# Patient Record
Sex: FEMALE | Race: WHITE | NOT HISPANIC OR LATINO
[De-identification: names, ages, dates, MRNs, and addresses within clinical notes are randomized per-mention and may not be internally consistent; named-entity substitution may affect disease eponyms.]

---

## 2022-05-24 ENCOUNTER — TRANSCRIPTION ENCOUNTER (OUTPATIENT)
Age: 87
End: 2022-05-24

## 2022-05-24 ENCOUNTER — INPATIENT (INPATIENT)
Facility: HOSPITAL | Age: 87
LOS: 6 days | Discharge: HOME | DRG: 480 | End: 2022-05-31
Attending: FAMILY MEDICINE | Admitting: STUDENT IN AN ORGANIZED HEALTH CARE EDUCATION/TRAINING PROGRAM
Payer: MEDICARE

## 2022-05-24 VITALS
HEART RATE: 66 BPM | DIASTOLIC BLOOD PRESSURE: 43 MMHG | WEIGHT: 67.9 LBS | TEMPERATURE: 98 F | OXYGEN SATURATION: 98 % | HEIGHT: 60 IN | RESPIRATION RATE: 15 BRPM | SYSTOLIC BLOOD PRESSURE: 134 MMHG

## 2022-05-24 DIAGNOSIS — S72.002A FRACTURE OF UNSPECIFIED PART OF NECK OF LEFT FEMUR, INITIAL ENCOUNTER FOR CLOSED FRACTURE: ICD-10-CM

## 2022-05-24 DIAGNOSIS — D72.829 ELEVATED WHITE BLOOD CELL COUNT, UNSPECIFIED: ICD-10-CM

## 2022-05-24 DIAGNOSIS — F41.9 ANXIETY DISORDER, UNSPECIFIED: ICD-10-CM

## 2022-05-24 DIAGNOSIS — I50.9 HEART FAILURE, UNSPECIFIED: ICD-10-CM

## 2022-05-24 DIAGNOSIS — S72.009A FRACTURE OF UNSPECIFIED PART OF NECK OF UNSPECIFIED FEMUR, INITIAL ENCOUNTER FOR CLOSED FRACTURE: ICD-10-CM

## 2022-05-24 DIAGNOSIS — D64.9 ANEMIA, UNSPECIFIED: ICD-10-CM

## 2022-05-24 DIAGNOSIS — N17.9 ACUTE KIDNEY FAILURE, UNSPECIFIED: ICD-10-CM
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
## 2022-05-24 DIAGNOSIS — E03.9 HYPOTHYROIDISM, UNSPECIFIED: ICD-10-CM

## 2022-05-24 DIAGNOSIS — Z87.898 PERSONAL HISTORY OF OTHER SPECIFIED CONDITIONS: ICD-10-CM

## 2022-05-24 DIAGNOSIS — Z29.9 ENCOUNTER FOR PROPHYLACTIC MEASURES, UNSPECIFIED: ICD-10-CM

## 2022-05-24 DIAGNOSIS — Z90.710 ACQUIRED ABSENCE OF BOTH CERVIX AND UTERUS: Chronic | ICD-10-CM

## 2022-05-24 DIAGNOSIS — Z83.79 FAMILY HISTORY OF OTHER DISEASES OF THE DIGESTIVE SYSTEM: Chronic | ICD-10-CM

## 2022-05-24 LAB
ALBUMIN SERPL ELPH-MCNC: 3.6 G/DL — SIGNIFICANT CHANGE UP (ref 3.3–5)
ALP SERPL-CCNC: 48 U/L — SIGNIFICANT CHANGE UP (ref 40–120)
ALT FLD-CCNC: 21 U/L — SIGNIFICANT CHANGE UP (ref 12–78)
ANION GAP SERPL CALC-SCNC: 8 MMOL/L — SIGNIFICANT CHANGE UP (ref 5–17)
APTT BLD: 28 SEC — SIGNIFICANT CHANGE UP (ref 27.5–35.5)
AST SERPL-CCNC: 24 U/L — SIGNIFICANT CHANGE UP (ref 15–37)
BASOPHILS # BLD AUTO: 0.07 K/UL — SIGNIFICANT CHANGE UP (ref 0–0.2)
BASOPHILS NFR BLD AUTO: 0.6 % — SIGNIFICANT CHANGE UP (ref 0–2)
BILIRUB SERPL-MCNC: 0.4 MG/DL — SIGNIFICANT CHANGE UP (ref 0.2–1.2)
BLD GP AB SCN SERPL QL: SIGNIFICANT CHANGE UP
BUN SERPL-MCNC: 44 MG/DL — HIGH (ref 7–23)
CALCIUM SERPL-MCNC: 8.9 MG/DL — SIGNIFICANT CHANGE UP (ref 8.5–10.1)
CHLORIDE SERPL-SCNC: 104 MMOL/L — SIGNIFICANT CHANGE UP (ref 96–108)
CK MB CFR SERPL CALC: <1 NG/ML — SIGNIFICANT CHANGE UP (ref 0–3.6)
CO2 SERPL-SCNC: 27 MMOL/L — SIGNIFICANT CHANGE UP (ref 22–31)
CREAT SERPL-MCNC: 1.9 MG/DL — HIGH (ref 0.5–1.3)
EGFR: 24 ML/MIN/1.73M2 — LOW
EOSINOPHIL # BLD AUTO: 0.08 K/UL — SIGNIFICANT CHANGE UP (ref 0–0.5)
EOSINOPHIL NFR BLD AUTO: 0.7 % — SIGNIFICANT CHANGE UP (ref 0–6)
GLUCOSE SERPL-MCNC: 104 MG/DL — HIGH (ref 70–99)
HCT VFR BLD CALC: 32.1 % — LOW (ref 34.5–45)
HGB BLD-MCNC: 10.1 G/DL — LOW (ref 11.5–15.5)
IMM GRANULOCYTES NFR BLD AUTO: 0.6 % — SIGNIFICANT CHANGE UP (ref 0–1.5)
INR BLD: 1.03 RATIO — SIGNIFICANT CHANGE UP (ref 0.88–1.16)
LYMPHOCYTES # BLD AUTO: 19.4 % — SIGNIFICANT CHANGE UP (ref 13–44)
LYMPHOCYTES # BLD AUTO: 2.13 K/UL — SIGNIFICANT CHANGE UP (ref 1–3.3)
MCHC RBC-ENTMCNC: 29.8 PG — SIGNIFICANT CHANGE UP (ref 27–34)
MCHC RBC-ENTMCNC: 31.5 GM/DL — LOW (ref 32–36)
MCV RBC AUTO: 94.7 FL — SIGNIFICANT CHANGE UP (ref 80–100)
MONOCYTES # BLD AUTO: 1.09 K/UL — HIGH (ref 0–0.9)
MONOCYTES NFR BLD AUTO: 9.9 % — SIGNIFICANT CHANGE UP (ref 2–14)
NEUTROPHILS # BLD AUTO: 7.53 K/UL — HIGH (ref 1.8–7.4)
NEUTROPHILS NFR BLD AUTO: 68.8 % — SIGNIFICANT CHANGE UP (ref 43–77)
NRBC # BLD: 0 /100 WBCS — SIGNIFICANT CHANGE UP (ref 0–0)
NT-PROBNP SERPL-SCNC: 2743 PG/ML — HIGH (ref 0–450)
PLATELET # BLD AUTO: 262 K/UL — SIGNIFICANT CHANGE UP (ref 150–400)
POTASSIUM SERPL-MCNC: 4.6 MMOL/L — SIGNIFICANT CHANGE UP (ref 3.5–5.3)
POTASSIUM SERPL-SCNC: 4.6 MMOL/L — SIGNIFICANT CHANGE UP (ref 3.5–5.3)
PROT SERPL-MCNC: 7.4 G/DL — SIGNIFICANT CHANGE UP (ref 6–8.3)
PROTHROM AB SERPL-ACNC: 12 SEC — SIGNIFICANT CHANGE UP (ref 10.5–13.4)
RBC # BLD: 3.39 M/UL — LOW (ref 3.8–5.2)
RBC # FLD: 13.8 % — SIGNIFICANT CHANGE UP (ref 10.3–14.5)
SARS-COV-2 RNA SPEC QL NAA+PROBE: SIGNIFICANT CHANGE UP
SODIUM SERPL-SCNC: 139 MMOL/L — SIGNIFICANT CHANGE UP (ref 135–145)
TROPONIN I, HIGH SENSITIVITY RESULT: 23.5 NG/L — SIGNIFICANT CHANGE UP
WBC # BLD: 10.97 K/UL — HIGH (ref 3.8–10.5)
WBC # FLD AUTO: 10.97 K/UL — HIGH (ref 3.8–10.5)

## 2022-05-24 PROCEDURE — 73552 X-RAY EXAM OF FEMUR 2/>: CPT | Mod: 26,LT

## 2022-05-24 PROCEDURE — 99223 1ST HOSP IP/OBS HIGH 75: CPT | Mod: GC

## 2022-05-24 PROCEDURE — 93010 ELECTROCARDIOGRAM REPORT: CPT

## 2022-05-24 PROCEDURE — 73502 X-RAY EXAM HIP UNI 2-3 VIEWS: CPT | Mod: 26,LT

## 2022-05-24 PROCEDURE — 73080 X-RAY EXAM OF ELBOW: CPT | Mod: 26,LT

## 2022-05-24 PROCEDURE — 76376 3D RENDER W/INTRP POSTPROCES: CPT | Mod: 26

## 2022-05-24 PROCEDURE — 70450 CT HEAD/BRAIN W/O DYE: CPT | Mod: 26,MA

## 2022-05-24 PROCEDURE — 73700 CT LOWER EXTREMITY W/O DYE: CPT | Mod: 26,LT,MA

## 2022-05-24 PROCEDURE — 71045 X-RAY EXAM CHEST 1 VIEW: CPT | Mod: 26

## 2022-05-24 PROCEDURE — 99285 EMERGENCY DEPT VISIT HI MDM: CPT

## 2022-05-24 PROCEDURE — 72125 CT NECK SPINE W/O DYE: CPT | Mod: 26,MA

## 2022-05-24 RX ORDER — ACETAMINOPHEN 500 MG
500 TABLET ORAL ONCE
Refills: 0 | Status: COMPLETED | OUTPATIENT
Start: 2022-05-24 | End: 2022-05-24

## 2022-05-24 RX ORDER — FUROSEMIDE 40 MG
20 TABLET ORAL DAILY
Refills: 0 | Status: DISCONTINUED | OUTPATIENT
Start: 2022-05-24 | End: 2022-05-25

## 2022-05-24 RX ORDER — ACETAMINOPHEN 500 MG
1000 TABLET ORAL ONCE
Refills: 0 | Status: DISCONTINUED | OUTPATIENT
Start: 2022-05-24 | End: 2022-05-24

## 2022-05-24 RX ORDER — LANOLIN ALCOHOL/MO/W.PET/CERES
3 CREAM (GRAM) TOPICAL AT BEDTIME
Refills: 0 | Status: DISCONTINUED | OUTPATIENT
Start: 2022-05-24 | End: 2022-05-25

## 2022-05-24 RX ORDER — ONDANSETRON 8 MG/1
4 TABLET, FILM COATED ORAL EVERY 8 HOURS
Refills: 0 | Status: DISCONTINUED | OUTPATIENT
Start: 2022-05-24 | End: 2022-05-25

## 2022-05-24 RX ORDER — LEVOTHYROXINE SODIUM 125 MCG
50 TABLET ORAL DAILY
Refills: 0 | Status: DISCONTINUED | OUTPATIENT
Start: 2022-05-24 | End: 2022-05-25

## 2022-05-24 RX ORDER — SODIUM CHLORIDE 9 MG/ML
1000 INJECTION, SOLUTION INTRAVENOUS
Refills: 0 | Status: DISCONTINUED | OUTPATIENT
Start: 2022-05-24 | End: 2022-05-25

## 2022-05-24 RX ORDER — PANTOPRAZOLE SODIUM 20 MG/1
40 TABLET, DELAYED RELEASE ORAL
Refills: 0 | Status: DISCONTINUED | OUTPATIENT
Start: 2022-05-24 | End: 2022-05-25

## 2022-05-24 RX ORDER — CITALOPRAM 10 MG/1
40 TABLET, FILM COATED ORAL DAILY
Refills: 0 | Status: DISCONTINUED | OUTPATIENT
Start: 2022-05-24 | End: 2022-05-25

## 2022-05-24 RX ORDER — METOPROLOL TARTRATE 50 MG
50 TABLET ORAL DAILY
Refills: 0 | Status: DISCONTINUED | OUTPATIENT
Start: 2022-05-24 | End: 2022-05-25

## 2022-05-24 RX ORDER — BACITRACIN ZINC 500 UNIT/G
1 OINTMENT IN PACKET (EA) TOPICAL ONCE
Refills: 0 | Status: COMPLETED | OUTPATIENT
Start: 2022-05-24 | End: 2022-05-24

## 2022-05-24 RX ORDER — AMLODIPINE BESYLATE 2.5 MG/1
5 TABLET ORAL DAILY
Refills: 0 | Status: DISCONTINUED | OUTPATIENT
Start: 2022-05-24 | End: 2022-05-25

## 2022-05-24 RX ORDER — ACETAMINOPHEN 500 MG
650 TABLET ORAL EVERY 6 HOURS
Refills: 0 | Status: DISCONTINUED | OUTPATIENT
Start: 2022-05-24 | End: 2022-05-25

## 2022-05-24 RX ADMIN — Medication 200 MILLIGRAM(S): at 22:26

## 2022-05-24 RX ADMIN — Medication 500 MILLIGRAM(S): at 23:10

## 2022-05-24 RX ADMIN — Medication 500 MILLIGRAM(S): at 22:41

## 2022-05-24 RX ADMIN — Medication 1 APPLICATION(S): at 22:29

## 2022-05-24 NOTE — H&P ADULT - NSHPSOCIALHISTORY_GEN_ALL_CORE
Lives with:   ADLs:   Ambulation:   COVID:   Tobacco:   Alcohol:   Drugs: Lives with: son   ADLs: independent   Ambulation: refuse to use walker   Tobacco: History of 3 PDD for 40 years, quit 30 years ago.   Alcohol: denies   Drugs: denies Lives with: son   ADLs: independent   Ambulation: refuse to use walker   Tobacco: History of 3 PPD for 40 years, quit 30 years ago.   Alcohol: denies   Drugs: denies

## 2022-05-24 NOTE — H&P ADULT - HISTORY OF PRESENT ILLNESS
89 y/o female w/ PMHx of ___ presents after having a fall earlier today. Patient was in bathroom, choked on something and did not have walker nearby when she then fell on the floor. Denies any LOC.       In ED:   Vitals: 97.6F, HR 66, 134/43, RR 15, 98% on RA   Labs significant for: WBC 10.97, hgb 10.1, Cr 1.9 (baseline unknown), BUN 44, BNP 2743   Imaging: CT head and cspine: scalp hematoma w/o intracranial hemorrhage, no cspine fracture  CT hip: Comminuted, displaced fracture of the greater trochanter of the femur. There appears to be extension of the fracture into the intertrochanteric region of the left femur.  EKG:   Recieved:     89 y/o female w/ PMHx of ___ presents after having a fall earlier today. Patient was in bathroom, choked on something and did not have walker nearby when she then fell on the floor. Denies any LOC.       In ED:   Vitals: 97.6F, HR 66, 134/43, RR 15, 98% on RA   Labs significant for: WBC 10.97, hgb 10.1, Cr 1.9 (baseline unknown), BUN 44, BNP 2743   Imaging: CT head and cspine: scalp hematoma w/o intracranial hemorrhage, no cspine fracture  CT hip: Comminuted, displaced fracture of the greater trochanter of the femur. There appears to be extension of the fracture into the intertrochanteric region of the left femur.  EKG:   Recieved: Ofirmev 1000mg x1, ofirmev 500mg x1     89 y/o female w/ PMHx of HTN, HF (unknown EF), hypertension, depression and anxiety presents after having a fall earlier today. Patient chocked on something, was coughing,  went to bathroom and suddenly fell. She hit head in the frontal side, L elbow and hip. Had an episode of urinary incontinence after fall. Patient's son helped to get out from the floor, denies LOC. Patient denies presyncopal symptoms. Patient has multiple falls in the last couple of weeks due to unstable gait, however patient refuse to use walker, last fall was 3 days ago. Patent endorses poor appetite has 2 meals per day. Has constipation, not able to remember when was the last BM. She has chronic SIDHU.   At this time she denies headaches, dizziness, nausea, vomiting, chest pain, abdominal pain, fever, hematuria, melena, hematochezia or other symptoms.       In ED:   Vitals: 97.6F, HR 66, 134/43, RR 15, 98% on RA   Labs significant for: WBC 10.97, hgb 10.1, Cr 1.9 (baseline unknown), BUN 44, BNP 2743    CT head and c spine: scalp hematoma w/o intracranial hemorrhage, no c spine fracture  CT hip: Comminuted, displaced fracture of the greater trochanter of the femur. There appears to be extension of the fracture into the intertrochanteric region of the left femur.  EKG: NSR @ 68 bpm   Received Ofirmev 1000mg x1, ofirmev 500mg x1 and Ortho consulted in the ED     89 y/o female w/ PMHx of HTN, HF (unknown EF), hypertension, depression and anxiety presents after having a fall earlier today. Patient chocked on something, was coughing,  went to bathroom and suddenly fell. She hit head in the frontal side, L elbow and hip. Had an episode of urinary incontinence after fall. Patient's son helped to get out from the floor, denies LOC. Patient denies presyncopal symptoms. Patient has multiple falls in the last couple of weeks due to unstable gait, however patient refuse to use walker per son, last fall was 3 days ago. Patent endorses poor appetite has 2 meals per day. Has constipation, not able to remember when was the last BM. She has chronic SIDHU.   At this time she denies headaches, dizziness, nausea, vomiting, chest pain, abdominal pain, fever, hematuria, melena, hematochezia or other symptoms.   Patient has been living with her son for the last 3 months.       In ED:   Vitals: 97.6F, HR 66, 134/43, RR 15, 98% on RA   Labs significant for: WBC 10.97, hgb 10.1, Cr 1.9 (baseline unknown), BUN 44, BNP 2743    CT head and c spine: scalp hematoma w/o intracranial hemorrhage, no c spine fracture  CT hip: Comminuted, displaced fracture of the greater trochanter of the femur. There appears to be extension of the fracture into the intertrochanteric region of the left femur.  EKG: NSR @ 68 bpm   Received Ofirmev 1000mg x1, ofirmev 500mg x1 and Ortho consulted in the ED

## 2022-05-24 NOTE — CONSULT NOTE ADULT - SUBJECTIVE AND OBJECTIVE BOX
Pt Name: JORGE BACON    MRN: [id number]  son bedside   HPI: Patient is a 89y Female s/p fall in the bathroom. States she did hit her head but denies LOC. Currently had left hip pain, unable to walk. Baseline walks with a walker, falls alot per the son. They are unsure if they want surgery. States mother states she is ready to go. Explained in great detail the pros and cons of operative fixation and no op. Son would like to talk to his mom tonight to decide.   .    PAST MEDICAL & SURGICAL HISTORY:  Hypothyroid      HTN (hypertension)      No significant past surgical history    Allergies: codeine (Nausea)  sulfa drugs (Nausea)      Ambulation: Baseline Ambulation With Walker                           10.1   10.97 )-----------( 262      ( 24 May 2022 21:33 )             32.1     05-24    139  |  104  |  44<H>  ----------------------------<  104<H>  4.6   |  27  |  1.90<H>    Ca    8.9      24 May 2022 21:33    TPro  7.4  /  Alb  3.6  /  TBili  0.4  /  DBili  x   /  AST  24  /  ALT  21  /  AlkPhos  48  05-24    PT/INR - ( 24 May 2022 21:33 )   PT: 12.0 sec;   INR: 1.03 ratio         PTT - ( 24 May 2022 21:33 )  PTT:28.0 sec      PHYSICAL EXAM:    Vital Signs Last 24 Hrs  T(C): 36.7 (24 May 2022 22:15), Max: 36.7 (24 May 2022 22:15)  T(F): 98 (24 May 2022 22:15), Max: 98 (24 May 2022 22:15)  HR: 69 (24 May 2022 22:15) (66 - 69)  BP: 130/62 (24 May 2022 22:15) (130/62 - 134/43)  BP(mean): --  RR: 16 (24 May 2022 22:15) (15 - 16)  SpO2: 97% (24 May 2022 22:15) (97% - 98%)    Physical Exam:  General: NAD, Alert, Awake and oriented    LEFT LE: No open skin. Tenderness left hip with ROM but pain. NT left knee with FROM. NT left ankle with FROM. good movement of the toes. NVI distally, distal pulse palpable     Secondary Survey:   RLE/RUE/LUE: No TTP over bony prominences, SILT, palpable pulses, full/painless range of motion, compartments soft    Imaging:   xray shows a left hip fracture     Orthopedic A/P:    Pt is a  89y Female with left hip fracture requiring surgical fixation       - son to decide with mom about surgery. will follow up in the AM.  States mom is considering surgery now after conversation of pros and cons of surgical and non surgical intervention.   - Plan for OR with Dr. Winston for ORIF left hip  - Pain control PRN  -NPO after midnight for OR tomorrow  -Medical management appreciated. Please document medical clearance for OR  -IV fluids to start once NPO  -Pre-operative ABX   -DVT PE ppx, SCDs, hold DVT chemoppx before OR  -F/U Additonal imaging  -N/V Checks to monitor for compartment signs  - Dr. Winston to follow up with update plan

## 2022-05-24 NOTE — H&P ADULT - PROBLEM SELECTOR PLAN 10
SCDs  IMPROVE VTE Individual Risk Assessment  RISK                                                                Points  [  ] Previous VTE                                                  3  [  ] Thrombophilia                                               2  [  ] Lower limb paralysis                                      2        (unable to hold up >15 seconds)    [  ] Current Cancer                                              2         (within 6 months)  [  ] Immobilization > 24 hrs                                1  [  ] ICU/CCU stay > 24 hours                              1  [ x ] Age > 60                                                      1  IMPROVE VTE Score ______1___ SCDs for possible or in am   IMPROVE VTE Individual Risk Assessment  RISK                                                                Points  [  ] Previous VTE                                                  3  [  ] Thrombophilia                                               2  [  ] Lower limb paralysis                                      2        (unable to hold up >15 seconds)    [  ] Current Cancer                                              2         (within 6 months)  [  ] Immobilization > 24 hrs                                1  [  ] ICU/CCU stay > 24 hours                              1  [ x ] Age > 60                                                      1  IMPROVE VTE Score ______1___

## 2022-05-24 NOTE — H&P ADULT - PROBLEM SELECTOR PLAN 4
Pt history of iron deficiency anemia, had iron infusions in the past  Likely anemia of chronic disease/anemia of inflammation in the setting of CKD, autoimmune disease (RA, IBD), malignancy, infection (pneumonia), chronic CHF, COPD, sepsis  - H/H 10.1/32.1 on admission, baseline hemoglobin unknown  - Currently hemodynamically stable, monitor for signs and symptoms of active bleeding  - Consider transfusion for hemoglobin <7   - Blood transfusion consent signed and placed in chart  - Maintain active type and screen  - F/u iron panel: iron, ferritin, TIBC, transferrin  - F/u folate, vitamin B12, TSH  - F/u AM CBC Pt history of iron deficiency anemia, had iron infusions in the past  - H/H 10.1/32.1 on admission, baseline hemoglobin unknown  - Currently hemodynamically stable, monitor for signs and symptoms of active bleeding  - Consider transfusion for hemoglobin <7   - Blood transfusion consent signed and placed in chart  - Maintain active type and screen  - F/u iron panel: iron, ferritin, TIBC, transferrin  - F/u folate, vitamin B12, TSH  - F/u AM CBC Patient reports history of dysphagia, last night chocked on something   - Will consult S&S Patient reports history of dysphagia, last night chocked on something   - Will consult S&S  - npo pmn for possible or in am

## 2022-05-24 NOTE — H&P ADULT - NSHPREVIEWOFSYSTEMS_GEN_ALL_CORE
CONSTITUTIONAL: denies fever, chills, fatigue, weakness  HEENT: denies blurred vision, sore throat  SKIN: denies new lesions, rash  CARDIOVASCULAR: denies chest pain, chest pressure, palpitations  RESPIRATORY: denies shortness of breath, sputum production  GASTROINTESTINAL: denies nausea, vomiting, diarrhea, abdominal pain  GENITOURINARY: denies dysuria, discharge  NEUROLOGICAL: denies numbness, headache, focal weakness  MUSCULOSKELETAL: denies new joint pain, muscle aches  HEMATOLOGIC: denies gross bleeding, bruising  LYMPHATICS: denies enlarged lymph nodes, extremity swelling  PSYCHIATRIC: denies recent changes in anxiety, depression  ENDOCRINOLOGIC: denies sweating, cold or heat intolerance CONSTITUTIONAL: denies fever, chills, fatigue, weakness  HEENT: denies blurred vision, sore throat  SKIN: denies new lesions, rash  CARDIOVASCULAR: denies chest pain, chest pressure, palpitations  RESPIRATORY: admits chronic shortness of breath, denies sputum production  GASTROINTESTINAL: denies nausea, vomiting, diarrhea, abdominal pain. Constipation   GENITOURINARY: denies dysuria, discharge  NEUROLOGICAL: denies numbness, headache, focal weakness  MUSCULOSKELETAL: denies new joint pain, muscle aches  HEMATOLOGIC: denies gross bleeding, bruising  LYMPHATICS: denies enlarged lymph nodes, extremity swelling  PSYCHIATRIC: denies recent changes in anxiety, depression  ENDOCRINOLOGIC: denies sweating, cold or heat intolerance

## 2022-05-24 NOTE — H&P ADULT - PROBLEM SELECTOR PLAN 8
Chronic   - On synthroid 50 mcg qd, will continue   - F/up TSH Chronic, non active issues   - Continue home citalopram 40 mg qd

## 2022-05-24 NOTE — H&P ADULT - ASSESSMENT
89 y/o female w/ PMHx of HTN, HF (unknown EF), hypotension, depression and anxiety presents after having a fall earlier today. Patient admitted for L hip fracture

## 2022-05-24 NOTE — ED ADULT NURSE NOTE - NSIMPLEMENTINTERV_GEN_ALL_ED
Implemented All Fall with Harm Risk Interventions:  [place] to call system. Call bell, personal items and telephone within reach. Instruct patient to call for assistance. Room bathroom lighting operational. Non-slip footwear when patient is off stretcher. Physically safe environment: no spills, clutter or unnecessary equipment. Stretcher in lowest position, wheels locked, appropriate side rails in place. Provide visual cue, wrist band, yellow gown, etc. Monitor gait and stability. Monitor for mental status changes and reorient to person, place, and time. Review medications for side effects contributing to fall risk. Reinforce activity limits and safety measures with patient and family. Provide visual clues: red socks.

## 2022-05-24 NOTE — H&P ADULT - PROBLEM SELECTOR PLAN 9
SCDs  IMPROVE VTE Individual Risk Assessment  RISK                                                                Points  [  ] Previous VTE                                                  3  [  ] Thrombophilia                                               2  [  ] Lower limb paralysis                                      2        (unable to hold up >15 seconds)    [  ] Current Cancer                                              2         (within 6 months)  [  ] Immobilization > 24 hrs                                1  [  ] ICU/CCU stay > 24 hours                              1  [ x ] Age > 60                                                      1  IMPROVE VTE Score ______1___ Chronic   - On synthroid 50 mcg qd, will continue   - F/up TSH

## 2022-05-24 NOTE — ED PROVIDER NOTE - CLINICAL SUMMARY MEDICAL DECISION MAKING FREE TEXT BOX
fall, hit head, left elbow, left hip, r/o trauma, f/u ct head, c-spine, left hip, xrays, labs, ekg, pain control, admit

## 2022-05-24 NOTE — H&P ADULT - ATTENDING COMMENTS
89 y/o female w/ PMHx of HTN, HF (unknown EF), hypotension, depression and anxiety presents after having a fall earlier today. Patient admitted for L hip fracture.      Assessment and plan as above. Edited personally directly into the body of the note where appropriate.

## 2022-05-24 NOTE — H&P ADULT - NSHPPHYSICALEXAM_GEN_ALL_CORE
T(C): 36.7 (05-24-22 @ 22:15), Max: 36.7 (05-24-22 @ 22:15)  HR: 69 (05-24-22 @ 22:15) (66 - 69)  BP: 130/62 (05-24-22 @ 22:15) (130/62 - 134/43)  RR: 16 (05-24-22 @ 22:15) (15 - 16)  SpO2: 97% (05-24-22 @ 22:15) (97% - 98%)    GENERAL: patient appears well, no acute distress, appropriate, pleasant  EYES: sclera clear, no exudates  ENMT: oropharynx clear without erythema, no exudates, moist mucous membranes  NECK: supple, soft, no thyromegaly noted  LUNGS: good air entry bilaterally, clear to auscultation, symmetric breath sounds, no wheezing or rhonchi appreciated  HEART: soft S1/S2, regular rate and rhythm, no murmurs noted, no lower extremity edema  GASTROINTESTINAL: abdomen is soft, nontender, nondistended, normoactive bowel sounds, no palpable masses  INTEGUMENT: good skin turgor, no lesions noted  MUSCULOSKELETAL: no clubbing or cyanosis, no obvious deformity  NEUROLOGIC: awake, alert, oriented x3, good muscle tone in 4 extremities, no obvious sensory deficits  PSYCHIATRIC: mood is good, affect is congruent, linear and logical thought process  HEME/LYMPH: no palpable supraclavicular nodules, no obvious ecchymosis or petechiae T(C): 36.7 (05-24-22 @ 22:15), Max: 36.7 (05-24-22 @ 22:15)  HR: 69 (05-24-22 @ 22:15) (66 - 69)  BP: 130/62 (05-24-22 @ 22:15) (130/62 - 134/43)  RR: 16 (05-24-22 @ 22:15) (15 - 16)  SpO2: 97% (05-24-22 @ 22:15) (97% - 98%)    GENERAL: no acute distress  HEAD: Left frontal hematoma  EYES: sclera clear, no exudates  ENMT: oropharynx clear without erythema, no exudates, moist mucous membranes. [place]  LUNGS: good air entry bilaterally, clear to auscultation, symmetric breath sounds, no wheezing or rhonchi appreciated  HEART: soft S1/S2, regular rate and rhythm, systolic noted, no lower extremity edema  GASTROINTESTINAL: abdomen is soft, nontender, nondistended, normoactive bowel sounds, no palpable masses  INTEGUMENT: Ecchymosis in the Left elbow and b/l LE   MUSCULOSKELETAL: no clubbing or cyanosis, no obvious deformity  NEUROLOGIC: awake, alert, oriented x3, no obvious sensory deficits  PSYCHIATRIC: mood is good, affect is congruent, linear and logical thought process  HEME/LYMPH: no palpable supraclavicular nodules

## 2022-05-24 NOTE — ED ADULT NURSE NOTE - OBJECTIVE STATEMENT
Pt. received alert and oriented x3 with chief complaint of trip and fall while in bathroom hitting head. Pt. states pain to left hip. Pt. presents w/ skin tear to left elbow and laceration w/ hematoma to left forehead w/ possible LOC.

## 2022-05-24 NOTE — H&P ADULT - PROBLEM SELECTOR PLAN 7
Chronic, non active issues   - Continue home citalopram 40 mg qd Chronic, unknown EF   - asymptomatic w/ no signs & symptoms of fluid overload  - Elevated Pro BNP 2743  - EKG on admission shows NSR at 68 bpm   - Continue home Toprol 50 mg qd   - Will continue home Lasix 20 mg PO with close monitoring of renal function  - O2 to maintain O2 Sat > 92%  - strict I&Os & daily weights   - f/u TTE Chronic, unknown EF   - asymptomatic w/ no signs & symptoms of fluid overload  - Elevated Pro BNP 2743  - EKG on admission shows NSR at 68 bpm   - Continue home Toprol 50 mg qd   - hold home lasix in setting of brenda, can restart if cr remains stable or if signs of volume overload   - O2 to maintain O2 Sat > 92%  - strict I&Os & daily weights   - f/u TTE

## 2022-05-24 NOTE — H&P ADULT - PROBLEM SELECTOR PLAN 3
Patient reports history of dysphagia, last night chocked on something   - Will consult S&S Likely reactive due to fall   - No signs or symptoms of active infectious process   - Trend CBC with diff

## 2022-05-24 NOTE — H&P ADULT - NSHPLABSRESULTS_GEN_ALL_CORE
.  LABS:                         10.1   10.97 )-----------( 262      ( 24 May 2022 21:33 )             32.1     05-24    139  |  104  |  44<H>  ----------------------------<  104<H>  4.6   |  27  |  1.90<H>    Ca    8.9      24 May 2022 21:33    TPro  7.4  /  Alb  3.6  /  TBili  0.4  /  DBili  x   /  AST  24  /  ALT  21  /  AlkPhos  48  05-24    PT/INR - ( 24 May 2022 21:33 )   PT: 12.0 sec;   INR: 1.03 ratio         PTT - ( 24 May 2022 21:33 )  PTT:28.0 sec    Serum Pro-Brain Natriuretic Peptide: 2743 pg/mL (05-24 @ 21:33)        RADIOLOGY, EKG & ADDITIONAL TESTS: Reviewed.

## 2022-05-24 NOTE — H&P ADULT - PROBLEM SELECTOR PLAN 6
Chronic, unknown EF   - asymptomatic w/ no signs & symptoms of fluid overload  - EKG on admission shows NSR at 68 bpm   - Continue home Toprol 50 mg qd   - Will continue home Lasix 20 mg PO with close monitoring of renal function  - O2 to maintain O2 Sat > 92%  - strict I&Os & daily weights   - f/u TTE GIL (on CKD) likely 2/2 pre-renal azotemia in the setting of dehydration,  poor PO intake  - Creatinine 1.9 on admission   - Baseline creatinine unknown  - IVF at 60 cc/hr for 12 hours  - Monitor BMP  - Avoid nephrotoxic medications  - Monitor renal indices GIL (on CKD) likely 2/2 pre-renal azotemia in the setting of dehydration,  poor PO intake  - Creatinine 1.9 on admission   - Baseline creatinine unknown  - IVF at 60 cc/hr for 12 hours  - Monitor BMP  - Avoid nephrotoxic medications  - Monitor renal indices  - nephro consult in am if cr worsens

## 2022-05-24 NOTE — H&P ADULT - PROBLEM SELECTOR PLAN 1
Patient presents s/p mechanical fall.   - Admit to [facility]   - CT hip showed comminuted, displaced fracture of the greater trochanter of the femur. There appears to be extension of the fracture into the intertrochanteric region of the left femur.  - Ortho consulted in the ED, recommending OR.   - NPO after midnight   - Pain regimen: Tylenol 650 mg q6h PRN for mild pain, Morphine 2 mg q6h for moderate pain and 4 q6h for severe pain.   - Bowel regimen: Senna and Miralax   - Patient currently has no active cardiac conditions. No signs of ischemia, ADHF, clinical exam not consistent with stenotic valvular disease, no unstable arrhythmias noted. Baseline functional capacity is < 4 METS. RCRI score is 1. Patient is currently hemodynamically stable. Patient is medically optimized at this time and understands the inherent risks of surgery. Routine hemodynamic monitoring is suggested.  - Cardio Dr. Downey's group consulted for cardiac clearance Patient presents s/p mechanical fall.   - Admit to [facility]   - CT hip showed comminuted, displaced fracture of the greater trochanter of the femur. There appears to be extension of the fracture into the intertrochanteric region of the left femur.  - Ortho consulted in the ED, recommending OR.   - NPO after midnight   - Pain regimen: Tylenol 650 mg q6h PRN for mild pain, Toradol 15mg q6h for moderate pain and 30 q6h for severe pain.   - Bowel regimen: Senna and Miralax   - Patient currently has no active cardiac conditions. No signs of ischemia, ADHF, clinical exam not consistent with stenotic valvular disease, no unstable arrhythmias noted. Baseline functional capacity is < 4 METS. RCRI score is 1. Patient is currently hemodynamically stable. Patient is medically optimized at this time and understands the inherent risks of surgery. Routine hemodynamic monitoring is suggested.  - Cardio Dr. Downey's group consulted for cardiac clearance  - PT consulted Patient presents s/p mechanical fall.   - Admit to [facility]   - CT hip showed comminuted, displaced fracture of the greater trochanter of the femur. There appears to be extension of the fracture into the intertrochanteric region of the left femur.  - Ortho consulted in the ED, recommending OR.   - NPO after midnight   - Pain regimen: Tylenol 650 mg q6h PRN for mild pain, tramadol 25mg prn for moderate pain, 50mg for severe pain. Patient reports intolerance to codeine however tolerated tramadol in the er. Patient states that years ago she was given codeine and had an unknown reaction but denies anaphylaxis, states she just felt "as if something was not right".   - will monitor tolerance of tramadol closely and dc if patient reports reaction. Will attempt to use non opiod medications when possible after or.   - Bowel regimen: Senna and Miralax   - Patient currently has no active cardiac conditions. No signs of ischemia, ADHF, clinical exam not consistent with stenotic valvular disease, no unstable arrhythmias noted. Baseline functional capacity is < 4 METS. RCRI score is 1. Patient is currently hemodynamically stable. Patient is medically optimized at this time and understands the inherent risks of surgery. Routine hemodynamic monitoring is suggested. Cardiology clearance needs to be obtained prior to or.   - Cardio Dr. Downey's group consulted for cardiac clearance  - PT consulted  - patient is not sure she wants to have surgery, she will make final decision in am

## 2022-05-24 NOTE — ED PROVIDER NOTE - OBJECTIVE STATEMENT
89 female PMH aortic stenosis, HTN, hypothyroid, anxiety/depression, presents to ER by ambulance with report of fall in the bathroom left hip pain unable to ambulate due to pain.

## 2022-05-24 NOTE — H&P ADULT - PROBLEM SELECTOR PLAN 5
GIL (on CKD) likely 2/2 pre-renal azotemia in the setting of dehydration,  poor PO intake  - Creatinine 1.9 on admission   - Baseline creatinine unknown  - IVF at 60 cc/hr for 12 hours  - Monitor BMP  - Avoid nephrotoxic medications  - Monitor renal indices Pt history of iron deficiency anemia, had iron infusions in the past  - H/H 10.1/32.1 on admission, baseline hemoglobin unknown  - Currently hemodynamically stable, monitor for signs and symptoms of active bleeding  - Consider transfusion for hemoglobin <7   - Blood transfusion consent signed and placed in chart  - Maintain active type and screen  - F/u iron panel: iron, ferritin, TIBC, transferrin  - F/u folate, vitamin B12, TSH  - F/u AM CBC

## 2022-05-24 NOTE — H&P ADULT - PROBLEM SELECTOR PLAN 2
Patient has recurrent fall, likely multifactorial due to advance age, unstable gait and refuse to use walker   -  CT head and c spine: scalp hematoma w/o intracranial hemorrhage, no c spine fracture  - Left hip fracture   - Fall precautions, bed alarm, chair alarm  - PT eval  - Social work eval  - Nutrition consulted in the ED

## 2022-05-24 NOTE — H&P ADULT - NSICDXPASTMEDICALHX_GEN_ALL_CORE_FT
PAST MEDICAL HISTORY:  Anxiety and depression     HF (heart failure)     HTN (hypertension)     Hypothyroid

## 2022-05-25 ENCOUNTER — TRANSCRIPTION ENCOUNTER (OUTPATIENT)
Age: 87
End: 2022-05-25

## 2022-05-25 DIAGNOSIS — D72.829 ELEVATED WHITE BLOOD CELL COUNT, UNSPECIFIED: ICD-10-CM

## 2022-05-25 LAB
ANION GAP SERPL CALC-SCNC: 10 MMOL/L — SIGNIFICANT CHANGE UP (ref 5–17)
ANION GAP SERPL CALC-SCNC: 6 MMOL/L — SIGNIFICANT CHANGE UP (ref 5–17)
APTT BLD: 31.5 SEC — SIGNIFICANT CHANGE UP (ref 27.5–35.5)
BASOPHILS # BLD AUTO: 0.05 K/UL — SIGNIFICANT CHANGE UP (ref 0–0.2)
BASOPHILS NFR BLD AUTO: 0.6 % — SIGNIFICANT CHANGE UP (ref 0–2)
BUN SERPL-MCNC: 45 MG/DL — HIGH (ref 7–23)
BUN SERPL-MCNC: 48 MG/DL — HIGH (ref 7–23)
CALCIUM SERPL-MCNC: 8.2 MG/DL — LOW (ref 8.5–10.1)
CALCIUM SERPL-MCNC: 8.8 MG/DL — SIGNIFICANT CHANGE UP (ref 8.5–10.1)
CHLORIDE SERPL-SCNC: 105 MMOL/L — SIGNIFICANT CHANGE UP (ref 96–108)
CHLORIDE SERPL-SCNC: 107 MMOL/L — SIGNIFICANT CHANGE UP (ref 96–108)
CO2 SERPL-SCNC: 25 MMOL/L — SIGNIFICANT CHANGE UP (ref 22–31)
CO2 SERPL-SCNC: 29 MMOL/L — SIGNIFICANT CHANGE UP (ref 22–31)
CREAT SERPL-MCNC: 1.8 MG/DL — HIGH (ref 0.5–1.3)
CREAT SERPL-MCNC: 2 MG/DL — HIGH (ref 0.5–1.3)
EGFR: 23 ML/MIN/1.73M2 — LOW
EGFR: 26 ML/MIN/1.73M2 — LOW
EOSINOPHIL # BLD AUTO: 0.05 K/UL — SIGNIFICANT CHANGE UP (ref 0–0.5)
EOSINOPHIL NFR BLD AUTO: 0.6 % — SIGNIFICANT CHANGE UP (ref 0–6)
FERRITIN SERPL-MCNC: 686 NG/ML — HIGH (ref 15–150)
FOLATE SERPL-MCNC: 7.7 NG/ML — SIGNIFICANT CHANGE UP
GLUCOSE SERPL-MCNC: 82 MG/DL — SIGNIFICANT CHANGE UP (ref 70–99)
GLUCOSE SERPL-MCNC: 83 MG/DL — SIGNIFICANT CHANGE UP (ref 70–99)
HCT VFR BLD CALC: 22.2 % — LOW (ref 34.5–45)
HCT VFR BLD CALC: 30 % — LOW (ref 34.5–45)
HGB BLD-MCNC: 6.9 G/DL — CRITICAL LOW (ref 11.5–15.5)
HGB BLD-MCNC: 9.4 G/DL — LOW (ref 11.5–15.5)
IMM GRANULOCYTES NFR BLD AUTO: 0.5 % — SIGNIFICANT CHANGE UP (ref 0–1.5)
INR BLD: 1.05 RATIO — SIGNIFICANT CHANGE UP (ref 0.88–1.16)
IRON SATN MFR SERPL: 12 % — LOW (ref 14–50)
IRON SATN MFR SERPL: 23 UG/DL — LOW (ref 30–160)
LYMPHOCYTES # BLD AUTO: 2.54 K/UL — SIGNIFICANT CHANGE UP (ref 1–3.3)
LYMPHOCYTES # BLD AUTO: 30.7 % — SIGNIFICANT CHANGE UP (ref 13–44)
MAGNESIUM SERPL-MCNC: 2.5 MG/DL — SIGNIFICANT CHANGE UP (ref 1.6–2.6)
MCHC RBC-ENTMCNC: 30.2 PG — SIGNIFICANT CHANGE UP (ref 27–34)
MCHC RBC-ENTMCNC: 30.4 PG — SIGNIFICANT CHANGE UP (ref 27–34)
MCHC RBC-ENTMCNC: 31.1 GM/DL — LOW (ref 32–36)
MCHC RBC-ENTMCNC: 31.3 GM/DL — LOW (ref 32–36)
MCV RBC AUTO: 96.5 FL — SIGNIFICANT CHANGE UP (ref 80–100)
MCV RBC AUTO: 97.8 FL — SIGNIFICANT CHANGE UP (ref 80–100)
MONOCYTES # BLD AUTO: 1.19 K/UL — HIGH (ref 0–0.9)
MONOCYTES NFR BLD AUTO: 14.4 % — HIGH (ref 2–14)
NEUTROPHILS # BLD AUTO: 4.41 K/UL — SIGNIFICANT CHANGE UP (ref 1.8–7.4)
NEUTROPHILS NFR BLD AUTO: 53.2 % — SIGNIFICANT CHANGE UP (ref 43–77)
NRBC # BLD: 0 /100 WBCS — SIGNIFICANT CHANGE UP (ref 0–0)
NRBC # BLD: 0 /100 WBCS — SIGNIFICANT CHANGE UP (ref 0–0)
PHOSPHATE SERPL-MCNC: 4.5 MG/DL — SIGNIFICANT CHANGE UP (ref 2.5–4.5)
PLATELET # BLD AUTO: 203 K/UL — SIGNIFICANT CHANGE UP (ref 150–400)
PLATELET # BLD AUTO: 230 K/UL — SIGNIFICANT CHANGE UP (ref 150–400)
POTASSIUM SERPL-MCNC: 4.8 MMOL/L — SIGNIFICANT CHANGE UP (ref 3.5–5.3)
POTASSIUM SERPL-MCNC: 4.8 MMOL/L — SIGNIFICANT CHANGE UP (ref 3.5–5.3)
POTASSIUM SERPL-SCNC: 4.8 MMOL/L — SIGNIFICANT CHANGE UP (ref 3.5–5.3)
POTASSIUM SERPL-SCNC: 4.8 MMOL/L — SIGNIFICANT CHANGE UP (ref 3.5–5.3)
PROTHROM AB SERPL-ACNC: 12.3 SEC — SIGNIFICANT CHANGE UP (ref 10.5–13.4)
RBC # BLD: 2.27 M/UL — LOW (ref 3.8–5.2)
RBC # BLD: 3.11 M/UL — LOW (ref 3.8–5.2)
RBC # FLD: 14 % — SIGNIFICANT CHANGE UP (ref 10.3–14.5)
RBC # FLD: 14 % — SIGNIFICANT CHANGE UP (ref 10.3–14.5)
SODIUM SERPL-SCNC: 140 MMOL/L — SIGNIFICANT CHANGE UP (ref 135–145)
SODIUM SERPL-SCNC: 142 MMOL/L — SIGNIFICANT CHANGE UP (ref 135–145)
TIBC SERPL-MCNC: 191 UG/DL — LOW (ref 220–430)
TRANSFERRIN SERPL-MCNC: 159 MG/DL — LOW (ref 200–360)
TSH SERPL-MCNC: 9.22 UIU/ML — HIGH (ref 0.36–3.74)
UIBC SERPL-MCNC: 168 UG/DL — SIGNIFICANT CHANGE UP (ref 110–370)
VIT B12 SERPL-MCNC: 492 PG/ML — SIGNIFICANT CHANGE UP (ref 232–1245)
WBC # BLD: 14.95 K/UL — HIGH (ref 3.8–10.5)
WBC # BLD: 8.28 K/UL — SIGNIFICANT CHANGE UP (ref 3.8–10.5)
WBC # FLD AUTO: 14.95 K/UL — HIGH (ref 3.8–10.5)
WBC # FLD AUTO: 8.28 K/UL — SIGNIFICANT CHANGE UP (ref 3.8–10.5)

## 2022-05-25 PROCEDURE — 99223 1ST HOSP IP/OBS HIGH 75: CPT

## 2022-05-25 PROCEDURE — 99233 SBSQ HOSP IP/OBS HIGH 50: CPT | Mod: GC

## 2022-05-25 PROCEDURE — 93306 TTE W/DOPPLER COMPLETE: CPT | Mod: 26

## 2022-05-25 PROCEDURE — 99497 ADVNCD CARE PLAN 30 MIN: CPT | Mod: 25

## 2022-05-25 DEVICE — SCREW LOCK FULLY THREADED 5X32.5MM: Type: IMPLANTABLE DEVICE | Site: LEFT | Status: FUNCTIONAL

## 2022-05-25 DEVICE — STRYKER TROCHANTERIC NAIL 11MM X 180MM 125 DEGREE: Type: IMPLANTABLE DEVICE | Site: LEFT | Status: FUNCTIONAL

## 2022-05-25 DEVICE — K-WIRE STRYKER 3.2M X 450MM: Type: IMPLANTABLE DEVICE | Site: LEFT | Status: FUNCTIONAL

## 2022-05-25 DEVICE — IMPLANTABLE DEVICE: Type: IMPLANTABLE DEVICE | Site: LEFT | Status: FUNCTIONAL

## 2022-05-25 RX ORDER — HEPARIN SODIUM 5000 [USP'U]/ML
5000 INJECTION INTRAVENOUS; SUBCUTANEOUS EVERY 8 HOURS
Refills: 0 | Status: DISCONTINUED | OUTPATIENT
Start: 2022-05-26 | End: 2022-05-26

## 2022-05-25 RX ORDER — TRAMADOL HYDROCHLORIDE 50 MG/1
100 TABLET ORAL EVERY 6 HOURS
Refills: 0 | Status: DISCONTINUED | OUTPATIENT
Start: 2022-05-25 | End: 2022-05-31

## 2022-05-25 RX ORDER — POTASSIUM CHLORIDE 20 MEQ
8 PACKET (EA) ORAL DAILY
Refills: 0 | Status: DISCONTINUED | OUTPATIENT
Start: 2022-05-25 | End: 2022-05-25

## 2022-05-25 RX ORDER — TRAMADOL HYDROCHLORIDE 50 MG/1
50 TABLET ORAL EVERY 6 HOURS
Refills: 0 | Status: DISCONTINUED | OUTPATIENT
Start: 2022-05-25 | End: 2022-05-25

## 2022-05-25 RX ORDER — SODIUM CHLORIDE 9 MG/ML
1000 INJECTION, SOLUTION INTRAVENOUS
Refills: 0 | Status: DISCONTINUED | OUTPATIENT
Start: 2022-05-25 | End: 2022-05-26

## 2022-05-25 RX ORDER — HYDROMORPHONE HYDROCHLORIDE 2 MG/ML
0.25 INJECTION INTRAMUSCULAR; INTRAVENOUS; SUBCUTANEOUS
Refills: 0 | Status: DISCONTINUED | OUTPATIENT
Start: 2022-05-25 | End: 2022-05-25

## 2022-05-25 RX ORDER — TRAMADOL HYDROCHLORIDE 50 MG/1
50 TABLET ORAL EVERY 6 HOURS
Refills: 0 | Status: DISCONTINUED | OUTPATIENT
Start: 2022-05-25 | End: 2022-05-31

## 2022-05-25 RX ORDER — ONDANSETRON 8 MG/1
4 TABLET, FILM COATED ORAL THREE TIMES A DAY
Refills: 0 | Status: DISCONTINUED | OUTPATIENT
Start: 2022-05-26 | End: 2022-05-31

## 2022-05-25 RX ORDER — SENNA PLUS 8.6 MG/1
2 TABLET ORAL AT BEDTIME
Refills: 0 | Status: DISCONTINUED | OUTPATIENT
Start: 2022-05-25 | End: 2022-05-31

## 2022-05-25 RX ORDER — METOPROLOL TARTRATE 50 MG
50 TABLET ORAL DAILY
Refills: 0 | Status: DISCONTINUED | OUTPATIENT
Start: 2022-05-26 | End: 2022-05-31

## 2022-05-25 RX ORDER — CEFAZOLIN SODIUM 1 G
2000 VIAL (EA) INJECTION EVERY 8 HOURS
Refills: 0 | Status: COMPLETED | OUTPATIENT
Start: 2022-05-25 | End: 2022-05-26

## 2022-05-25 RX ORDER — CEFAZOLIN SODIUM 1 G
3000 VIAL (EA) INJECTION ONCE
Refills: 0 | Status: DISCONTINUED | OUTPATIENT
Start: 2022-05-25 | End: 2022-05-25

## 2022-05-25 RX ORDER — AMLODIPINE BESYLATE 2.5 MG/1
5 TABLET ORAL DAILY
Refills: 0 | Status: DISCONTINUED | OUTPATIENT
Start: 2022-05-25 | End: 2022-05-31

## 2022-05-25 RX ORDER — POLYETHYLENE GLYCOL 3350 17 G/17G
17 POWDER, FOR SOLUTION ORAL AT BEDTIME
Refills: 0 | Status: DISCONTINUED | OUTPATIENT
Start: 2022-05-25 | End: 2022-05-31

## 2022-05-25 RX ORDER — BENZOCAINE AND MENTHOL 5; 1 G/100ML; G/100ML
1 LIQUID ORAL
Refills: 0 | Status: DISCONTINUED | OUTPATIENT
Start: 2022-05-26 | End: 2022-05-31

## 2022-05-25 RX ORDER — LANOLIN ALCOHOL/MO/W.PET/CERES
3 CREAM (GRAM) TOPICAL AT BEDTIME
Refills: 0 | Status: DISCONTINUED | OUTPATIENT
Start: 2022-05-25 | End: 2022-05-31

## 2022-05-25 RX ORDER — CEFAZOLIN SODIUM 1 G
2000 VIAL (EA) INJECTION ONCE
Refills: 0 | Status: DISCONTINUED | OUTPATIENT
Start: 2022-05-25 | End: 2022-05-25

## 2022-05-25 RX ORDER — POLYETHYLENE GLYCOL 3350 17 G/17G
17 POWDER, FOR SOLUTION ORAL DAILY
Refills: 0 | Status: DISCONTINUED | OUTPATIENT
Start: 2022-05-25 | End: 2022-05-25

## 2022-05-25 RX ORDER — SODIUM CHLORIDE 9 MG/ML
1000 INJECTION, SOLUTION INTRAVENOUS
Refills: 0 | Status: DISCONTINUED | OUTPATIENT
Start: 2022-05-25 | End: 2022-05-25

## 2022-05-25 RX ORDER — KETOROLAC TROMETHAMINE 30 MG/ML
15 SYRINGE (ML) INJECTION EVERY 6 HOURS
Refills: 0 | Status: DISCONTINUED | OUTPATIENT
Start: 2022-05-25 | End: 2022-05-25

## 2022-05-25 RX ORDER — PANTOPRAZOLE SODIUM 20 MG/1
40 TABLET, DELAYED RELEASE ORAL
Refills: 0 | Status: DISCONTINUED | OUTPATIENT
Start: 2022-05-25 | End: 2022-05-31

## 2022-05-25 RX ORDER — CITALOPRAM 10 MG/1
40 TABLET, FILM COATED ORAL DAILY
Refills: 0 | Status: DISCONTINUED | OUTPATIENT
Start: 2022-05-25 | End: 2022-05-31

## 2022-05-25 RX ORDER — LEVOTHYROXINE SODIUM 125 MCG
50 TABLET ORAL DAILY
Refills: 0 | Status: DISCONTINUED | OUTPATIENT
Start: 2022-05-25 | End: 2022-05-28

## 2022-05-25 RX ORDER — KETOROLAC TROMETHAMINE 30 MG/ML
30 SYRINGE (ML) INJECTION EVERY 6 HOURS
Refills: 0 | Status: DISCONTINUED | OUTPATIENT
Start: 2022-05-25 | End: 2022-05-25

## 2022-05-25 RX ORDER — SENNA PLUS 8.6 MG/1
2 TABLET ORAL AT BEDTIME
Refills: 0 | Status: DISCONTINUED | OUTPATIENT
Start: 2022-05-25 | End: 2022-05-25

## 2022-05-25 RX ORDER — TRAMADOL HYDROCHLORIDE 50 MG/1
25 TABLET ORAL EVERY 6 HOURS
Refills: 0 | Status: DISCONTINUED | OUTPATIENT
Start: 2022-05-25 | End: 2022-05-25

## 2022-05-25 RX ORDER — TRAMADOL HYDROCHLORIDE 50 MG/1
25 TABLET ORAL ONCE
Refills: 0 | Status: DISCONTINUED | OUTPATIENT
Start: 2022-05-25 | End: 2022-05-25

## 2022-05-25 RX ADMIN — TRAMADOL HYDROCHLORIDE 25 MILLIGRAM(S): 50 TABLET ORAL at 01:40

## 2022-05-25 RX ADMIN — Medication 3 MILLIGRAM(S): at 02:35

## 2022-05-25 RX ADMIN — PANTOPRAZOLE SODIUM 40 MILLIGRAM(S): 20 TABLET, DELAYED RELEASE ORAL at 06:01

## 2022-05-25 RX ADMIN — Medication 50 MICROGRAM(S): at 05:49

## 2022-05-25 RX ADMIN — SODIUM CHLORIDE 60 MILLILITER(S): 9 INJECTION, SOLUTION INTRAVENOUS at 03:54

## 2022-05-25 RX ADMIN — TRAMADOL HYDROCHLORIDE 25 MILLIGRAM(S): 50 TABLET ORAL at 00:40

## 2022-05-25 NOTE — DISCHARGE NOTE PROVIDER - CARE PROVIDER_API CALL
Flip Winston (DO)  Orthopaedic Surgery  [address] [place] [address]  Phone: [phone]  Fax: [phone]  Follow Up Time:    Flip Winston ()  Orthopaedic Surgery  [address] [place] [address]  Phone: [phone]  Fax: [phone]  Follow Up Time:     Louis Coles)  Cardiovascular Disease  [address] [place] [address]  Phone: [phone]  Fax: [phone]  Follow Up Time: 1 week   Flip Winston ()  Orthopaedic Surgery  [address] [place] [address]  Phone: [phone]  Fax: [phone]  Follow Up Time: 1 week    Louis Coles)  Cardiovascular Disease  [address] [place] [address]  Phone: [phone]  Fax: [phone]  Follow Up Time: 1 week    Earlene Perkins)  Internal Medicine; Nephrology  [address] [place] [address], [address]  [place] [address]  Phone: [phone]  Fax: [phone]  Follow Up Time: 2 weeks

## 2022-05-25 NOTE — DISCHARGE NOTE PROVIDER - HOSPITAL COURSE
FROM ADMISSION H+P:   HPI:  89 y/o female w/ PMHx of HTN, HF (unknown EF), hypertension, depression and anxiety presents after having a fall earlier today. Patient chocked on something, was coughing,  went to bathroom and suddenly fell. She hit head in the frontal side, L elbow and hip. Had an episode of urinary incontinence after fall. Patient's son helped to get out from the floor, denies LOC. Patient denies presyncopal symptoms. Patient has multiple falls in the last couple of weeks due to unstable gait, however patient refuse to use walker per son, last fall was 3 days ago. Patent endorses poor appetite has 2 meals per day. Has constipation, not able to remember when was the last BM. She has chronic SIDHU.   At this time she denies headaches, dizziness, nausea, vomiting, chest pain, abdominal pain, fever, hematuria, melena, hematochezia or other symptoms.   Patient has been living with her son for the last 3 months.       In ED:   Vitals: 97.6F, HR 66, 134/43, RR 15, 98% on RA   Labs significant for: WBC 10.97, hgb 10.1, Cr 1.9 (baseline unknown), BUN 44, BNP 2743    CT head and c spine: scalp hematoma w/o intracranial hemorrhage, no c spine fracture  CT hip: Comminuted, displaced fracture of the greater trochanter of the femur. There appears to be extension of the fracture into the intertrochanteric region of the left femur.  EKG: NSR @ 68 bpm   Received Ofirmev 1000mg x1, ofirmev 500mg x1 and Ortho consulted in the ED     (24 May 2022 22:21)      ---  HOSPITAL COURSE: Patient admitted for L hip fracture and underwent L Hip IMN on 5/25 with Ortho Dr. Winston. Orthopedics, Palliative Care, and Cardiology were consulted. Palliative care had GOC discussion with family, and the patient was made DNR/DNI postoperatively. Pt was admitted with symptomatic anemia and suffered acute blood loss anemia after OR intervention. Pt received 2u PRBC total while inpatient. Hgb stabilized while inpatient. Hospitalization was complicated by GIL likely in the setting of dehydration/poor PO intake, and her renal indices improved to 1.6 on day of discharge. Cardiology was consulted preoperatively for OR clearance. TTE was obtained, showed Normal left ventricular internal dimensions and systolic function, estimated LVEF of 60%.Grossly normal RV size and systolic function. Calcified trileaflet aortic valve with moderate aortic stenosis, moderate AI. Mild mitral stenosis with mild to moderate mitral regurgitation. No significant pericardial effusion. Pt's synthroid was increased while inpatient given elevated TSH, and the family was instructed to f/u with PCP to repeat TFTs. Pt has a history of dysphagia, speech/swallow was consulted but the patient and family declined evaluation and they would like to proceed with regular diet despite known risks for aspiration. Patient was medically optimized and improved clinically throughout hospital course. Patient seen and examined on day of discharge. Patient is medically stable for discharge to [facility] with outpatient follow up.     ---  CONSULTANTS:   Ortho Dr. Winston  Palliative Dr. Fox  [facility]      ---  TIME SPENT:  I, the attending physician, was physically present for the key portions of the evaluation and management (E/M) service provided. The total amount of time spent reviewing the hospital notes, laboratory values, imaging findings, assessing/counseling the patient, discussing with consultant physicians, social work, nursing staff was -- minutes    ---  Primary care provider was made aware of plan for discharge:      [  ] NO     [ x ] YES     FROM ADMISSION H+P:   HPI:  89 y/o female w/ PMHx of HTN, HF (unknown EF), hypertension, depression and anxiety presents after having a fall earlier today. Patient chocked on something, was coughing,  went to bathroom and suddenly fell. She hit head in the frontal side, L elbow and hip. Had an episode of urinary incontinence after fall. Patient's son helped to get out from the floor, denies LOC. Patient denies presyncopal symptoms. Patient has multiple falls in the last couple of weeks due to unstable gait, however patient refuse to use walker per son, last fall was 3 days ago. Patent endorses poor appetite has 2 meals per day. Has constipation, not able to remember when was the last BM. She has chronic SIDHU.   At this time she denies headaches, dizziness, nausea, vomiting, chest pain, abdominal pain, fever, hematuria, melena, hematochezia or other symptoms.   Patient has been living with her son for the last 3 months.       In ED:   Vitals: 97.6F, HR 66, 134/43, RR 15, 98% on RA   Labs significant for: WBC 10.97, hgb 10.1, Cr 1.9 (baseline unknown), BUN 44, BNP 2743    CT head and c spine: scalp hematoma w/o intracranial hemorrhage, no c spine fracture  CT hip: Comminuted, displaced fracture of the greater trochanter of the femur. There appears to be extension of the fracture into the intertrochanteric region of the left femur.  EKG: NSR @ 68 bpm   Received Ofirmev 1000mg x1, ofirmev 500mg x1 and Ortho consulted in the ED     (24 May 2022 22:21)      ---  HOSPITAL COURSE: Patient admitted for L hip fracture and underwent L Hip IMN on 5/25 with Ortho Dr. Winston. Orthopedics, Palliative Care, and Cardiology were consulted. Palliative care had GOC discussion with family, and the patient was made DNR/DNI postoperatively. Pt was admitted with symptomatic anemia and suffered acute blood loss anemia after OR intervention. Pt received 2u PRBC total while inpatient. Hgb stabilized while inpatient. Hospitalization was complicated by GIL likely in the setting of dehydration/poor PO intake, and her renal indices improved to 1.6 on day of discharge. Cardiology was consulted preoperatively for OR clearance. TTE was obtained, showed Normal left ventricular internal dimensions and systolic function, estimated LVEF of 60%. Grossly normal RV size and systolic function. Calcified trileaflet aortic valve with moderate aortic stenosis, moderate AI. Mild mitral stenosis with mild to moderate mitral regurgitation. No significant pericardial effusion. Pt's synthroid was increased while inpatient given elevated TSH, and the family was instructed to f/u with PCP to repeat TFTs. Pt has a history of dysphagia, speech/swallow was consulted but the patient and family declined evaluation and they would like to proceed with regular diet despite known risks for aspiration. Patient was medically optimized and improved clinically throughout hospital course. Patient seen and examined on day of discharge. Patient is medically stable for discharge to [facility] with outpatient follow up.     ---  CONSULTANTS:   Ortho Dr. Winston  Palliative Dr. Fox  [facility]      ---  TIME SPENT:  I, the attending physician, was physically present for the key portions of the evaluation and management (E/M) service provided. The total amount of time spent reviewing the hospital notes, laboratory values, imaging findings, assessing/counseling the patient, discussing with consultant physicians, social work, nursing staff was 39 minutes

## 2022-05-25 NOTE — CONSULT NOTE ADULT - CONVERSATION DETAILS
Reviewed patient's medical and social history as well as events leading to patient's hospitalization. Writer discussed patient's current diagnosis (advanced age, CHF, fall c/b hip fracture), medical condition and management,  prognosis, and life expectancy. Inquired about patient's wishes regarding extent of medical care to be provided including escalation of medical care into the ICU and use of vasopressor support. In addition, the writer inquired about thoughts regarding cardiopulmonary resuscitation, artificial nutrition and hydration including use of feeding tubes and IVF, antibiotics, and further investigative studies such as blood draws and radiology. Pt showed insight into medical condition. All questions answered. Pt requested DNR/DNI status. MOLST form filled out and placed in chart. We discussed quality of life with/without hip repair. Pt consented to surgery this afternoon and d/c to rehab. Psychosocial support provided.

## 2022-05-25 NOTE — DISCHARGE NOTE PROVIDER - NSDCFUADDINST_GEN_ALL_CORE_FT
Discharge Instructions for IMN:    1. ACTIVITY: WBAT.  Daily PT.  2. CALL FOR: fever over 101, wound redness, drainage or open area, calf pain/calf swelling.  3. BANDAGE: Change dressing to a new Mepilex Ag bandage POD7 (11/5). May change sooner if dressing saturated or falling off. DO NOT REMOVE BANDAGE TO CHECK WOUND ON INTAKE.  4. STAPLES: RN Remove Staples POD14   5. SHOWER: Okay to shower. Do not scrub dressing or submerge under water. No baths or hot tubs.   6. DVT PE Prophylaxis: Per primary team.  See Med Rec.  7.  GI: Continue Protonix daily while on Anticoagulant. eRx has been sent to your pharmacy.  8. LABS: INR only if on Coumadin.  9.  FOLLOW UP: Dr. Winston in 1 month. Call to schedule.  10. MEDICATION: eRX sent to your pharmacy for  if you go home.   11.**Call office if medications not covered under your insurance, especially BLOOD CLOT PREVENTION/anticoagulant medication.   Discharge Instructions for IMN:    1. ACTIVITY: WBAT.  Daily PT.  2. CALL FOR: fever over 101, wound redness, drainage or open area, calf pain/calf swelling.  3. BANDAGE: Change dressing to a new Mepilex Ag bandage POD7 (6/1/22). May change sooner if dressing saturated or falling off. DO NOT REMOVE BANDAGE TO CHECK WOUND ON INTAKE.  4. STAPLES: RN Remove Staples POD14   5. SHOWER: Okay to shower. Do not scrub dressing or submerge under water. No baths or hot tubs.   6. DVT PE Prophylaxis: Per primary team.  See Med Rec.  7.  GI: Continue Protonix daily while on Anticoagulant. eRx has been sent to your pharmacy.  8. LABS: INR only if on Coumadin.  9.  FOLLOW UP: Dr. Winston in 1 month. Call to schedule.  10. MEDICATION: eRX sent to your pharmacy for  if you go home.   11.**Call office if medications not covered under your insurance, especially BLOOD CLOT PREVENTION/anticoagulant medication.

## 2022-05-25 NOTE — PROGRESS NOTE ADULT - SUBJECTIVE AND OBJECTIVE BOX
JORGE BACON      89y   female  MRN-[id number]    ORTHOPEDIC SURGERY POST OP CHECK / DR. SALDANA    MEDICATIONS  (STANDING):  amLODIPine   Tablet 5 milliGRAM(s) Oral daily  ceFAZolin   IVPB 2000 milliGRAM(s) IV Intermittent every 8 hours  citalopram 40 milliGRAM(s) Oral daily  lactated ringers. 1000 milliLiter(s) (75 mL/Hr) IV Continuous <Continuous>  lactated ringers. 1000 milliLiter(s) (75 mL/Hr) IV Continuous <Continuous>  levothyroxine 50 MICROGram(s) Oral daily  melatonin 3 milliGRAM(s) Oral at bedtime  pantoprazole    Tablet 40 milliGRAM(s) Oral before breakfast  polyethylene glycol 3350 17 Gram(s) Oral at bedtime  potassium chloride    Tablet ER 8 milliEquivalent(s) Oral daily  senna 2 Tablet(s) Oral at bedtime    MEDICATIONS  (PRN):  HYDROmorphone  Injectable 0.25 milliGRAM(s) IV Push every 10 minutes PRN Moderate Pain (4 - 6)  traMADol 100 milliGRAM(s) Oral every 6 hours PRN Moderate Pain (4 - 6)  traMADol 50 milliGRAM(s) Oral every 6 hours PRN Mild Pain (1 - 3)    Vital Signs Last 24 Hrs  T(C): 36.3 (25 May 2022 22:18), Max: 36.8 (25 May 2022 20:01)  T(F): 97.4 (25 May 2022 22:18), Max: 98.2 (25 May 2022 20:01)  HR: 73 (25 May 2022 22:18) (61 - 85)  BP: 103/35 (25 May 2022 22:18) (96/37 - 159/48)  RR: 18 (25 May 2022 22:18) (14 - 20)  SpO2: 95% (25 May 2022 22:18) (95% - 100%)    LEFT    DRESSING DRY AND INTACT  NSLT L2-S1  COMPARTMENTS SOFT AND COMPRESSIBLE  + 2 DP AND PT   NO CALF TENDERNESS    Hemoglobin: 6.9 (05-25 @ 21:35) POST OP  Hemoglobin: 9.4 (05-25 @ 08:53)  Hemoglobin: 10.1 (05-24 @ 21:33)    Hematocrit: 22.2 (05-25 @ 21:35) POST OP   Hematocrit: 30.0 (05-25 @ 08:53)  Hematocrit: 32.1 (05-24 @ 21:33)    05-25    142  |  107  |  48<H>  ----------------------------<  83  4.8   |  25  |  2.00<H>    Ca    8.2<L>      25 May 2022 21:35  Phos  4.5     05-25  Mg     2.5     05-25    TPro  7.4  /  Alb  3.6  /  TBili  0.4  /  DBili  x   /  AST  24  /  ALT  21  /  AlkPhos  48  05-24                           ASSESSMENT &  PLAN:     S/P INTRAMEDULLARY NAILING LEFT FEMUR FX   ANEMIA POST OP ACUTE BLOOD LOSS      WEIGHT  BEARING AS TOLERATED, OOB AND AMBULATE, PHYSICAL THERAPY   CHEMICAL DVT PROPHYLAXIS TO BE ADDRESSED BY ORTHO / MEDICAL TEAM IN AM   ONE UNIT PRBC ORDERED BY MEDICAL TEAM, MAY NEED ADDITIONAL BLOOD TRANSFUSION   ORTHOPEDIC TEAM WILL FOLLOW UP IN AM

## 2022-05-25 NOTE — CONSULT NOTE ADULT - ATTENDING COMMENTS
generally intact elderly female with hip fx  planned for low intermediate risk procedure  carries dx of hf, but she had vol ol in the setting of having had lasix dc'd, and got better quickly  echo from 2018 ef 60-65 with moderate mr and ar, normal pa pressure  await repeat here  assuming no meaningful change in ef or valve disease would be considered optimized from a cv persp for planned surgery generally intact elderly female with hip fx  planned for low intermediate risk procedure  carries dx of hf, but she had vol ol in the setting of having had lasix dc'd, and got better quickly  echo from 2018 ef 60-65 with moderate mr and ar, normal pa pressure  IS NOT IN DECOMPENSATED HEART FAILURE DESPITE BNP LEVEL AS NOTED ABOVE  await repeat here  assuming no meaningful change in ef or valve disease would be considered optimized from a cv persp for planned surgery

## 2022-05-25 NOTE — CONSULT NOTE ADULT - ASSESSMENT
*********CHARTING IN PROGRESS***********      89 y/o female w/ PMHx of HTN, HF (unknown EF), hypertension, depression and anxiety presents after having a fall earlier today, cardio consult requested for cardiac clearance for surgery.    - Patient is not complaining of any cardiac symptoms at this time.  - No clear evidence of acute ischemia, trops negative x 2. Will follow up third set.  - His CKs are flat, suggesting against acute atherosclerotic plaque rupture.  - Biomarker trend is not consistent with plaque rupture but rather demand ischemia. Monitor closely for the development of anginal symptoms or clinical signs of ischemia.   - No acute changes on EKG compared to previous.  - No meaningful evidence of volume overload.  - Previous TTE shows ___.  - BP well controlled, monitor routine hemodynamics.  - Continue ___.  - Monitor and replete lytes, keep K>4, Mg>2.  - Strict I/Os, daily weights.  - Pt has no active ischemia, decompensated heart failure, unstable arrythmia, or severe stenotic valvular disease, and has __ cardiac risk factors. In the setting of low risk ___, he/she is optimized from cardiovascular standpoint to proceed with planned procedure with routine hemodynamic monitoring.   - Pt has no modifiable active cardiac risk factors and in the setting of low risk _____, patient is optimized as best as possible from cardiovascular standpoint to proceed with planned procedure with routine hemodynamic monitoring.  - Other cardiovascular workup will depend on clinical course.  - All other workup per primary team.  - Will continue to follow.               *********CHARTING IN PROGRESS***********      89 y/o female w/ PMHx of HTN, HF (unknown EF), hypertension, depression and anxiety presents after having a fall earlier today, cardio consult requested for cardiac clearance for surgery.    - Pt does have systolic murmur likely representing Aortic Stenosis however no prior Echo is available for comparison. Pt has no active ischemia, decompensated heart failure, unstable arrythmia. Has cardiac risk factors including HTN and HF with an unknown EF. Given advanced age w/ frailty, multiple comorbidities she should be considered a higher than average risk for moderate risk surgical repair of hip. She has no modifiable active cardiac risk factors and is optimized as best as possible from a cardiovascular standpoint to proceed with planned procedure with routine hemodynamic monitoring.   - Patient considered an RCRI class II risk indicating 6% 30 day risk of death, MI, or cardiac arrest following surgery.    - Patient is not complaining of any cardiac symptoms at this time.  - No clear evidence of acute ischemia, trops negative x 1 no need to trend.  - No prior EKG available for comparison  - No meaningful evidence of volume overload.  - No prior TTE available for comparison.  - BP well controlled, monitor routine hemodynamics.  - Continue home amlodipine, toprol xl  - hold home lasix for now as patient w/ creatinine 1.9 in the setting of unknown baseline Cr, does not appear acutely volume overloaded.     - Monitor and replete lytes, keep K>4, Mg>2.  - Strict I/Os, daily weights.  - Other cardiovascular workup will depend on clinical course.  - All other workup per primary team.  - Will continue to follow.               89 y/o female w/ PMHx of HTN, HF (unknown EF), hypertension, depression and anxiety presents after having a fall yesterday, cardio consult requested for cardiac clearance for surgery.    - Pt does have systolic murmur, history of mitral regurgitation/aortic regurgitation on prior echo per family. Will attempt to obtain prior echo from cardiologist Mars Riley's office. Follow up echo during this admission.    -Pt has no active ischemia, decompensated heart failure, unstable arrythmia. Has cardiac risk factors including HTN and CHF. Given advanced age w/ frailty, she should be considered a higher than average risk for moderate risk surgical repair of hip. However she has no modifiable active cardiac risk factors and is optimized as best as possible from a cardiovascular standpoint to proceed with planned procedure with routine hemodynamic monitoring.   - Patient considered an RCRI class II risk indicating 6% 30 day risk of death, MI, or cardiac arrest following surgery.    - Patient is not complaining of any cardiac symptoms at this time.  - No clear evidence of acute ischemia, trops negative x 1 no need to trend.  - No prior EKG available for comparison, EKG shows NSR.  - No meaningful evidence of volume overload.  - BP well controlled, monitor routine hemodynamics.  - Continue home amlodipine, toprol xl  - hold home lasix for now as patient w/ creatinine 1.9 in the setting of unknown baseline Cr, does not appear acutely volume overloaded.     - Monitor and replete lytes, keep K>4, Mg>2.  - Strict I/Os, daily weights.  - Other cardiovascular workup will depend on clinical course.  - All other workup per primary team.  - Will continue to follow.               89 y/o female w/ PMHx of HTN, HF (unknown EF), hypertension, depression and anxiety presents after having a fall yesterday, cardio consult requested for cardiac clearance for surgery.    - Pt does have systolic murmur, history of mitral regurgitation/aortic regurgitation on prior echo per family. Will attempt to obtain prior echo from cardiologist Mars Riley's office. Follow up echo during this admission.    -Pt has no active ischemia, decompensated heart failure, unstable arrythmia. Has cardiac risk factors including HTN and CHF. Given advanced age w/ frailty, she should be considered a somewhat higher than average risk for moderate risk surgical repair of hip. However she has no modifiable active cardiac risk factors and is optimized as best as possible from a cardiovascular standpoint to proceed with planned procedure with routine hemodynamic monitoring.   - Patient considered an RCRI class II risk indicating 6% 30 day risk of death, MI, or cardiac arrest following surgery.    - Patient is not complaining of any cardiac symptoms at this time.  - No clear evidence of acute ischemia, trops negative x 1 no need to trend.  - No prior EKG available for comparison, EKG shows NSR.  - No meaningful evidence of volume overload.  - BP well controlled, monitor routine hemodynamics.  - Continue home amlodipine, toprol xl  - hold home lasix for now as patient w/ creatinine 1.9 in the setting of unknown baseline Cr, does not appear acutely volume overloaded.     - Monitor and replete lytes, keep K>4, Mg>2.  - Strict I/Os, daily weights.  - Other cardiovascular workup will depend on clinical course.  - All other workup per primary team.  - Will continue to follow.               89 y/o female w/ PMHx of HTN, HF (unknown EF), hypertension, depression and anxiety presents after having a fall yesterday, cardio consult requested for cardiac clearance for surgery.    - Pt does have systolic murmur, history of mitral regurgitation/aortic regurgitation on prior echo per family. Will attempt to obtain prior echo from cardiologist Mars Riley's office. Would recommend following up echo during this admission before proceeding with surgical intervention.    -Pt has no active ischemia, decompensated heart failure, unstable arrythmia. Has cardiac risk factors including HTN and CHF. Given advanced age w/ frailty, she should be considered a somewhat higher than average risk for moderate risk surgical repair of hip. However she has no modifiable active cardiac risk factors and is optimized as best as possible from a cardiovascular standpoint to proceed with planned procedure with routine hemodynamic monitoring.   - Patient considered an RCRI class II risk indicating 6% 30 day risk of death, MI, or cardiac arrest following surgery.    - Patient is not complaining of any cardiac symptoms at this time.  - No clear evidence of acute ischemia, trops negative x 1 no need to trend.  - No prior EKG available for comparison, EKG shows NSR.  - No meaningful evidence of volume overload.  - BP well controlled, monitor routine hemodynamics.  - Continue home amlodipine, toprol xl  - hold home lasix for now as patient w/ creatinine 1.9 in the setting of unknown baseline Cr, does not appear acutely volume overloaded.     - Monitor and replete lytes, keep K>4, Mg>2.  - Strict I/Os, daily weights.  - Other cardiovascular workup will depend on clinical course.  - All other workup per primary team.  - Will continue to follow.               89 y/o female w/ PMHx of HTN, HF (unknown EF), hypertension, depression and anxiety presents after having a fall yesterday, cardio consult requested for cardiac clearance for surgery.    - Pt does have systolic murmur, history of mitral regurgitation/aortic regurgitation on prior echo per family. Will attempt to obtain prior echo from cardiologist Mars Riley's office. Would recommend following up echo during this admission before proceeding with surgical intervention.    -Pt has no active ischemia, decompensated heart failure, unstable arrythmia. Has cardiac risk factors including HTN and CHF. Given advanced age w/ frailty, she should be considered a somewhat higher than average risk for moderate risk surgical repair of hip. However she has no modifiable active cardiac risk factors and is optimized as best as possible from a cardiovascular standpoint to proceed with planned procedure with routine hemodynamic monitoring.   - Patient considered an RCRI class II risk indicating 6% 30 day risk of death, MI, or cardiac arrest following surgery.    - Patient is not complaining of any cardiac symptoms at this time.  - No clear evidence of acute ischemia, trops negative x 1 no need to trend.  - No prior EKG available for comparison, EKG shows NSR.  - No meaningful evidence of volume overload.  - BP well controlled, monitor routine hemodynamics.  - Continue home amlodipine, toprol xl  - hold home lasix for now as patient w/ creatinine 1.9 in the setting of unknown baseline Cr, does not appear acutely volume overloaded, despite the bnp.     - Monitor and replete lytes, keep K>4, Mg>2.  - Strict I/Os, daily weights.  - Other cardiovascular workup will depend on clinical course.  - All other workup per primary team.  - Will continue to follow.

## 2022-05-25 NOTE — DISCHARGE NOTE PROVIDER - NSDCCPCAREPLAN_GEN_ALL_CORE_FT
PRINCIPAL DISCHARGE DIAGNOSIS  Diagnosis: Hip fracture, left  Assessment and Plan of Treatment: You were admitted with a hip fracture. You underwent surgery with fixation of your hip fracture on 5/25 with Dr. Winston. Please follow up with your orthopedic surgeon within 1 week from   You or your family member are responsible for making all follow up appointments.        SECONDARY DISCHARGE DIAGNOSES  Diagnosis: GIL (acute kidney injury)  Assessment and Plan of Treatment: Your kidney function, monitored by Creatinine, slighly worsened while in the hospital. This was likely due to your dehydrated state, lack of eating. Your Creatinine and kidney function slowly improved during your stay.   Please follow up with your Primary Care Doctor within 1 week from    You or your family member are responsible for making all follow up appointments.      Diagnosis: Anemia  Assessment and Plan of Treatment: You had low blood counts, monitored by hemoglobin, on admission. You received 2 units of blood while in the hospital. Your blood count normalized prior to   Please follow up with your Primary Care Doctor within 1 week from    You or your family member are responsible for making all follow up appointments.      Diagnosis: HF (heart failure)  Assessment and Plan of Treatment: You were evaluated by a cardiologist before the OR and while in the hospital. You received an echocardiogram of your heart to assess the degree of heart failure you have.   The sonogram of your heart showed:  Normal left ventricular internal dimensions and systolic function, estimated LVEF of 60%.Grossly normal RV size and systolic function. Calcified trileaflet aortic valve with moderate aortic stenosis, moderate AI. Mild mitral stenosis with mild to moderate mitral regurgitation. No significant pericardial effusion.  Please continue taking metoprolol succinate 50mg twice a day.  Please follow up with your cardiologist within 1 week from   You or your family member are responsible for making all follow up appointments.      Diagnosis: Hypothyroid  Assessment and Plan of Treatment: Your synthroid medication was increased while hospitalized.  Please continue taking 75mcg synthroid daily. Please discuss this with your PCP so they can monitor your thyroid hormone levels.   Please follow up with your Primary Care Doctor within 1 week from    You or your family member are responsible for making all follow up appointments.       PRINCIPAL DISCHARGE DIAGNOSIS  Diagnosis: Hip fracture, left  Assessment and Plan of Treatment: You were admitted with a hip fracture. You underwent surgery with fixation of your hip fracture on 5/25 with Dr. Winston. Please follow up with your orthopedic surgeon within 1 week from discharge. You or your family member are responsible for making all follow up appointments.        SECONDARY DISCHARGE DIAGNOSES  Diagnosis: Anemia  Assessment and Plan of Treatment: You had low blood counts, monitored by hemoglobin, on admission. You received 2 units of blood while in the hospital. Your blood count normalized prior to   Please follow up with your Primary Care Doctor within 1 week from    You or your family member are responsible for making all follow up appointments.      Diagnosis: GIL (acute kidney injury)  Assessment and Plan of Treatment: Your kidney function, monitored by Creatinine, slighly worsened while in the hospital. This was likely due to your dehydrated state, lack of eating. Your Creatinine and kidney function slowly improved during your stay.   Please follow up with your Primary Care Doctor within 1 week from    Restart your home ls\asix on 6/4/22 and take medication every other day- follow up with Nephrology within 2 weeks of discharge      Diagnosis: HF (heart failure)  Assessment and Plan of Treatment: You were evaluated by a cardiologist before the OR and while in the hospital. You received an echocardiogram of your heart to assess the degree of heart failure you have.   The sonogram of your heart showed:  Normal left ventricular internal dimensions and systolic function, estimated LVEF of 60%.Grossly normal RV size and systolic function. Calcified trileaflet aortic valve with moderate aortic stenosis, moderate AI. Mild mitral stenosis with mild to moderate mitral regurgitation. No significant pericardial effusion.  Please continue taking metoprolol succinate 50mg twice a day.  Restart your lasix on 6/4/22 and take it every other day.  Please follow up with your cardiologist within 1 week from       Diagnosis: Hypothyroid  Assessment and Plan of Treatment: Your synthroid medication was increased while hospitalized.  Please continue taking 75mcg synthroid daily. Please discuss this with your PCP so they can monitor your thyroid hormone levels.   Please follow up with your Primary Care Doctor within 1 week from    You or your family member are responsible for making all follow up appointments.       PRINCIPAL DISCHARGE DIAGNOSIS  Diagnosis: Hip fracture, left  Assessment and Plan of Treatment: You were admitted with a hip fracture. You underwent surgery with fixation of your hip fracture on 5/25 with Dr. Winston. Please follow up with your orthopedic surgeon within 1 week from discharge. You or your family member are responsible for making all follow up appointments.        SECONDARY DISCHARGE DIAGNOSES  Diagnosis: Anemia  Assessment and Plan of Treatment: You had low blood counts, monitored by hemoglobin, on admission. You received 2 units of blood while in the hospital. Your blood count normalized prior to   Please follow up with your Primary Care Doctor within 1 week from    You or your family member are responsible for making all follow up appointments.      Diagnosis: GIL (acute kidney injury)  Assessment and Plan of Treatment: Your kidney function, monitored by Creatinine, slighly worsened while in the hospital. This was likely due to your dehydrated state, lack of eating. Your Creatinine and kidney function slowly improved during your stay.   Please follow up with your Primary Care Doctor within 1 week from    Restart your home lasix on 6/4/22 and take medication every other day- follow up with Nephrology within 2 weeks of discharge      Diagnosis: HF (heart failure)  Assessment and Plan of Treatment: You were evaluated by a cardiologist before the OR and while in the hospital. You received an echocardiogram of your heart to assess the degree of heart failure you have.   The sonogram of your heart showed:  Normal left ventricular internal dimensions and systolic function, estimated LVEF of 60%.Grossly normal RV size and systolic function. Calcified trileaflet aortic valve with moderate aortic stenosis, moderate AI. Mild mitral stenosis with mild to moderate mitral regurgitation. No significant pericardial effusion.  Please continue taking metoprolol succinate 50mg twice a day.  Restart your lasix on 6/4/22 and take it every other day. Take your potassium supplement on days you take your lasix.  Please follow up with your cardiologist within 1 week from       Diagnosis: Hypothyroid  Assessment and Plan of Treatment: Your synthroid medication was increased while hospitalized.  Please continue taking 75mcg synthroid daily. Please discuss this with your PCP so they can monitor your thyroid hormone levels.   Please follow up with your Primary Care Doctor within 1 week from    You or your family member are responsible for making all follow up appointments.

## 2022-05-25 NOTE — PROGRESS NOTE ADULT - PROBLEM SELECTOR PLAN 3
Likely reactive due to fall   - No signs or symptoms of active infectious process   - Trend CBC with diff

## 2022-05-25 NOTE — CONSULT NOTE ADULT - SUBJECTIVE AND OBJECTIVE BOX
Patient is a 89y old  Female who presents with a chief complaint of Hip fracture s/p fall (24 May 2022 22:53)      HPI:  89 y/o female w/ PMHx of HTN, HF (unknown EF), hypertension, depression and anxiety presents after having a fall earlier today. Patient chocked on something, was coughing,  went to bathroom and suddenly fell. She hit head in the frontal side, L elbow and hip. Had an episode of urinary incontinence after fall. Patient's son helped to get out from the floor, denies LOC. Patient denies presyncopal symptoms. Patient has multiple falls in the last couple of weeks due to unstable gait, however patient refuse to use walker per son, last fall was 3 days ago. Patent endorses poor appetite has 2 meals per day. Has constipation, not able to remember when was the last BM. She has chronic SIDHU.   At this time she denies headaches, dizziness, nausea, vomiting, chest pain, abdominal pain, fever, hematuria, melena, hematochezia or other symptoms.   Patient has been living with her son for the last 3 months.       In ED:   Vitals: 97.6F, HR 66, 134/43, RR 15, 98% on RA   Labs significant for: WBC 10.97, hgb 10.1, Cr 1.9 (baseline unknown), BUN 44, BNP 2743    CT head and c spine: scalp hematoma w/o intracranial hemorrhage, no c spine fracture  CT hip: Comminuted, displaced fracture of the greater trochanter of the femur. There appears to be extension of the fracture into the intertrochanteric region of the left femur.  EKG: NSR @ 68 bpm   Received Ofirmev 1000mg x1, ofirmev 500mg x1 and Ortho consulted in the ED     (24 May 2022 22:21)      PAST MEDICAL & SURGICAL HISTORY:  Hypothyroid      HTN (hypertension)      HF (heart failure)      Anxiety and depression      S/P hysterectomy      FH: cholecystectomy                  MEDICATIONS  (STANDING):  amLODIPine   Tablet 5 milliGRAM(s) Oral daily  citalopram 40 milliGRAM(s) Oral daily  lactated ringers. 1000 milliLiter(s) (60 mL/Hr) IV Continuous <Continuous>  levothyroxine 50 MICROGram(s) Oral daily  metoprolol succinate ER 50 milliGRAM(s) Oral daily  pantoprazole    Tablet 40 milliGRAM(s) Oral before breakfast  senna 2 Tablet(s) Oral at bedtime    MEDICATIONS  (PRN):  acetaminophen     Tablet .. 650 milliGRAM(s) Oral every 6 hours PRN Temp greater or equal to 38C (100.4F), Mild Pain (1 - 3)  aluminum hydroxide/magnesium hydroxide/simethicone Suspension 30 milliLiter(s) Oral every 4 hours PRN Dyspepsia  melatonin 3 milliGRAM(s) Oral at bedtime PRN Insomnia  ondansetron Injectable 4 milliGRAM(s) IV Push every 8 hours PRN Nausea and/or Vomiting  polyethylene glycol 3350 17 Gram(s) Oral daily PRN Constipation  traMADol 25 milliGRAM(s) Oral every 6 hours PRN Moderate Pain (4 - 6)  traMADol 50 milliGRAM(s) Oral every 6 hours PRN Severe Pain (7 - 10)      FAMILY HISTORY:  FH: heart disease (Father)        Constitutional: denies fever, chills  HEENT: denies blurry vision, difficulty hearing  Respiratory: denies SOB, SIDHU, cough  Cardiovascular: denies CP, palpitations, orthopnea, PND, LE edema  Gastrointestinal: denies nausea, vomiting, abdominal pain  Genitourinary: denies urinary changes  Skin: Denies rashes, itching  Neurologic: denies headache, weakness, dizziness  Hematology/Oncology: denies bleeding, easy bruising  ROS negative except as noted above      SOCIAL HISTORY:     Lives with: son   ADLs: independent   Ambulation: refuse to use walker   Tobacco: History of 3 PPD for 40 years, quit 30 years ago.   Alcohol: denies   Drugs: denies    Vital Signs Last 24 Hrs  T(C): 36.6 (25 May 2022 08:12), Max: 36.7 (24 May 2022 22:15)  T(F): 97.8 (25 May 2022 08:12), Max: 98 (24 May 2022 22:15)  HR: 61 (25 May 2022 08:12) (61 - 71)  BP: 116/53 (25 May 2022 08:12) (105/54 - 134/43)  RR: 16 (25 May 2022 08:12) (15 - 16)  SpO2: 98% (25 May 2022 08:12) (95% - 98%)    Physical Exam:  General: Well developed, well nourished, NAD  HEENT: NCAT, PERRLA, EOMI bl, moist mucous membranes   Neck: Supple, nontender, no mass  Neurology: A&Ox3, nonfocal, sensation intact   Respiratory: CTA B/L, No W/R/R  CV: RRR, +S1/S2, no murmurs, rubs or gallops  Abdominal: Soft, NT, ND +BSx4, no palpable masses  Extremities: No C/C/E, + peripheral pulses  MSK: Normal ROM, no joint erythema or warmth, no joint swelling   Heme: No obvious ecchymosis or petechiae   Skin: warm, dry, normal color      ECG: NSR 68    I&O's Detail      LABS:                        9.4    8.28  )-----------( 203      ( 25 May 2022 08:53 )             30.0     05-24    139  |  104  |  44<H>  ----------------------------<  104<H>  4.6   |  27  |  1.90<H>    Ca    8.9      24 May 2022 21:33    TPro  7.4  /  Alb  3.6  /  TBili  0.4  /  DBili  x   /  AST  24  /  ALT  21  /  AlkPhos  48  05-24    CARDIAC MARKERS ( 24 May 2022 21:33 )  x     / x     / x     / x     / <1.0 ng/mL      PT/INR - ( 24 May 2022 21:33 )   PT: 12.0 sec;   INR: 1.03 ratio         PTT - ( 24 May 2022 21:33 )  PTT:28.0 sec    I&O's Summary    BNPSerum Pro-Brain Natriuretic Peptide: 2743 pg/mL (05-24 @ 21:33)    RADIOLOGY & ADDITIONAL STUDIES:   Patient is a 89y old  Female who presents with a chief complaint of Hip fracture s/p fall (24 May 2022 22:53)      HPI:  89 y/o female w/ PMHx of HTN, HF (unknown EF), hypertension, depression and anxiety presents after having a fall earlier today. Patient chocked on something, was coughing,  went to bathroom and suddenly fell. She hit head in the frontal side, L elbow and hip. Had an episode of urinary incontinence after fall. Patient's son helped to get out from the floor, denies LOC. Patient denies presyncopal symptoms. Patient has multiple falls in the last couple of weeks due to unstable gait, however patient refuse to use walker per son, last fall was 3 days ago. Patent endorses poor appetite has 2 meals per day. Has constipation, not able to remember when was the last BM. She has chronic SIDHU.   At this time she denies headaches, dizziness, nausea, vomiting, chest pain, abdominal pain, fever, hematuria, melena, hematochezia or other symptoms.   Patient has been living with her son for the last 3 months.     Interval HPI:  Patient seen and examined at the bedside. She is in discussion w/ her family at this time whether she wants to go through with the procedure. Still has significant hip pain but denies any other symptoms of note. Family at bedside.      In ED:   Vitals: 97.6F, HR 66, 134/43, RR 15, 98% on RA   Labs significant for: WBC 10.97, hgb 10.1, Cr 1.9 (baseline unknown), BUN 44, BNP 2743    CT head and c spine: scalp hematoma w/o intracranial hemorrhage, no c spine fracture  CT hip: Comminuted, displaced fracture of the greater trochanter of the femur. There appears to be extension of the fracture into the intertrochanteric region of the left femur.  EKG: NSR @ 68 bpm   Received Ofirmev 1000mg x1, ofirmev 500mg x1 and Ortho consulted in the ED     (24 May 2022 22:21)      PAST MEDICAL & SURGICAL HISTORY:  Hypothyroid      HTN (hypertension)      HF (heart failure)      Anxiety and depression      S/P hysterectomy      FH: cholecystectomy                  MEDICATIONS  (STANDING):  amLODIPine   Tablet 5 milliGRAM(s) Oral daily  citalopram 40 milliGRAM(s) Oral daily  lactated ringers. 1000 milliLiter(s) (60 mL/Hr) IV Continuous <Continuous>  levothyroxine 50 MICROGram(s) Oral daily  metoprolol succinate ER 50 milliGRAM(s) Oral daily  pantoprazole    Tablet 40 milliGRAM(s) Oral before breakfast  senna 2 Tablet(s) Oral at bedtime    MEDICATIONS  (PRN):  acetaminophen     Tablet .. 650 milliGRAM(s) Oral every 6 hours PRN Temp greater or equal to 38C (100.4F), Mild Pain (1 - 3)  aluminum hydroxide/magnesium hydroxide/simethicone Suspension 30 milliLiter(s) Oral every 4 hours PRN Dyspepsia  melatonin 3 milliGRAM(s) Oral at bedtime PRN Insomnia  ondansetron Injectable 4 milliGRAM(s) IV Push every 8 hours PRN Nausea and/or Vomiting  polyethylene glycol 3350 17 Gram(s) Oral daily PRN Constipation  traMADol 25 milliGRAM(s) Oral every 6 hours PRN Moderate Pain (4 - 6)  traMADol 50 milliGRAM(s) Oral every 6 hours PRN Severe Pain (7 - 10)      FAMILY HISTORY:  FH: heart disease (Father)        Constitutional: denies fever, chills  HEENT: denies blurry vision  Respiratory: admits to some SIDHU, denies SOB, cough  Cardiovascular: denies CP, palpitations, orthopnea, PND, LE edema  Gastrointestinal: denies nausea, vomiting, abdominal pain  MSK: +left leg/hip pain  Neurologic: denies headache, weakness, dizziness  Hematology/Oncology: denies bleeding  ROS negative except as noted above      SOCIAL HISTORY:    Lives with: son   ADLs: independent   Ambulation: refuse to use walker   Tobacco: History of 3 PPD for 40 years, quit 30 years ago.   Alcohol: denies   Drugs: denies    Vital Signs Last 24 Hrs  T(C): 36.6 (25 May 2022 08:12), Max: 36.7 (24 May 2022 22:15)  T(F): 97.8 (25 May 2022 08:12), Max: 98 (24 May 2022 22:15)  HR: 61 (25 May 2022 08:12) (61 - 71)  BP: 116/53 (25 May 2022 08:12) (105/54 - 134/43)  RR: 16 (25 May 2022 08:12) (15 - 16)  SpO2: 98% (25 May 2022 08:12) (95% - 98%)    Physical Exam:  General: elderly frail female, NAD  HEENT: NCAT, EOMI bl, moist mucous membranes   Neck: Supple, nontender, no jvd noted  Neurology: A&Ox3, nonfocal, sensation intact   Respiratory: CTA B/L, No W/R/R  CV: RRR, +S1/S2, II/VI systolic ejection murmur best heard at RUSB.   Abdominal: Soft, NT, ND +BSx4, no palpable masses  Extremities: no peripheral edema noted.  MSK: tenderness to palpation L hip  Skin: warm, dry, normal color      ECG: NSR 68    I&O's Detail      LABS:                        9.4    8.28  )-----------( 203      ( 25 May 2022 08:53 )             30.0     05-24    139  |  104  |  44<H>  ----------------------------<  104<H>  4.6   |  27  |  1.90<H>    Ca    8.9      24 May 2022 21:33    TPro  7.4  /  Alb  3.6  /  TBili  0.4  /  DBili  x   /  AST  24  /  ALT  21  /  AlkPhos  48  05-24    CARDIAC MARKERS ( 24 May 2022 21:33 )  x     / x     / x     / x     / <1.0 ng/mL      PT/INR - ( 24 May 2022 21:33 )   PT: 12.0 sec;   INR: 1.03 ratio         PTT - ( 24 May 2022 21:33 )  PTT:28.0 sec    I&O's Summary    BNPSerum Pro-Brain Natriuretic Peptide: 2743 pg/mL (05-24 @ 21:33)    RADIOLOGY & ADDITIONAL STUDIES:

## 2022-05-25 NOTE — CONSULT NOTE ADULT - ASSESSMENT
89 y/o female w/ PMHx of HTN, HF (unknown EF), hypotension, depression and anxiety presents after having a fall earlier today. Patient admitted for L hip fracture    #Hip fracture, left.   #s/p mechanical fall.   - CT hip showed comminuted, displaced fracture of the greater trochanter of the femur. There appears to be extension of the fracture into the intertrochanteric region of the left femur.  - Ortho recommending OR.     #Intractable pain  - Tylenol 650 mg q6h PRN for mild pain, tramadol 25mg prn for moderate pain, 50mg for severe pain.     #Constipation prophylaxis 2/2 opioid use   - senna and miralax  - bisacodyl supp prn     #Fall at home.   - h/o recurrent fall, likely multifactorial due to advance age, unstable gait and refuse to use walker   - CT head and c spine: scalp hematoma w/o intracranial hemorrhage, no c spine fracture  - Left hip fracture   - Fall precautions, bed alarm, chair alarm  - PT eval    #GIL (on CKD) likely 2/2 pre-renal azotemia in the setting of dehydration,  poor PO intake  - Creatinine 1.9 on admission   - Baseline creatinine unknown  - IVF    #HF (heart failure).   - Chronic, unknown EF   - Elevated Pro BNP 2743  - EKG on admission shows NSR at 68 bpm   - Continue home Toprol 50 mg qd   - hold home lasix in setting of gil, can restart if cr remains stable or if signs of volume overload   - f/u TTE.    #Anxiety and depression.   - Chronic, non active issues   - Continue home citalopram 40 mg qd.    #Advanced age  #Goals of care/advance care planning  - Palliative performance scale score: 20% due to fall (poor); 70% prior to fall  - Prognosis: guarded  - Code status: DNR/DNI (MOLST form filled out and placed in the chart)  - GOC: hip repair followed by d/c to [facility]  - Psychosocial support provided    Appreciate consult. Discussed with the primary team.    Melinda Fox MD, [facility]

## 2022-05-25 NOTE — PROGRESS NOTE ADULT - SUBJECTIVE AND OBJECTIVE BOX
Patient is a 89y old  Female who presents with a chief complaint of Hip fracture (25 May 2022 14:23)      INTERVAL HPI/OVERNIGHT EVENTS: Patient seen and examined at bedside. No overnight events occurred. Patient has no complaints at this time, admits mild hip pain only when moving her leg. Denies fevers, chills, headache, lightheadedness, chest pain, dyspnea, abdominal pain, n/v/d/c. Son was present this AM, multidisiplinary meeting conducted with Ortho PA and Cardio Dr. Coles regarding risk/benefit for OR intervention for L hip fracture. Son wanted to discuss outcomes with Palliative team. Pt will undergo OR intervention for L hip fx today at 4pm and will be DNR/DNI postoperatively.    MEDICATIONS  (STANDING):  amLODIPine   Tablet 5 milliGRAM(s) Oral daily  citalopram 40 milliGRAM(s) Oral daily  lactated ringers. 1000 milliLiter(s) (60 mL/Hr) IV Continuous <Continuous>  levothyroxine 50 MICROGram(s) Oral daily  metoprolol succinate ER 50 milliGRAM(s) Oral daily  pantoprazole    Tablet 40 milliGRAM(s) Oral before breakfast  senna 2 Tablet(s) Oral at bedtime    MEDICATIONS  (PRN):  acetaminophen     Tablet .. 650 milliGRAM(s) Oral every 6 hours PRN Temp greater or equal to 38C (100.4F), Mild Pain (1 - 3)  aluminum hydroxide/magnesium hydroxide/simethicone Suspension 30 milliLiter(s) Oral every 4 hours PRN Dyspepsia  melatonin 3 milliGRAM(s) Oral at bedtime PRN Insomnia  ondansetron Injectable 4 milliGRAM(s) IV Push every 8 hours PRN Nausea and/or Vomiting  polyethylene glycol 3350 17 Gram(s) Oral daily PRN Constipation  traMADol 25 milliGRAM(s) Oral every 6 hours PRN Moderate Pain (4 - 6)  traMADol 50 milliGRAM(s) Oral every 6 hours PRN Severe Pain (7 - 10)      Allergies    sulfa drugs (Nausea)    Intolerances    codeine (Nausea)      REVIEW OF SYSTEMS:  CONSTITUTIONAL: No fever or chills  HEENT:  No headache, no sore throat  RESPIRATORY: No cough, wheezing, or shortness of breath  CARDIOVASCULAR: No chest pain, palpitations  GASTROINTESTINAL: No abd pain, nausea, vomiting, or diarrhea  GENITOURINARY: No dysuria, frequency, or hematuria  NEUROLOGICAL: no focal weakness or dizziness  MUSCULOSKELETAL: no myalgias ; admits L hip pain    Vital Signs Last 24 Hrs  T(C): 36.7 (25 May 2022 13:01), Max: 36.7 (24 May 2022 22:15)  T(F): 98 (25 May 2022 13:01), Max: 98 (24 May 2022 22:15)  HR: 63 (25 May 2022 13:01) (61 - 71)  BP: 112/42 (25 May 2022 13:01) (103/48 - 134/43)  BP(mean): --  RR: 17 (25 May 2022 13:01) (15 - 17)  SpO2: 95% (25 May 2022 13:01) (95% - 99%)    PHYSICAL EXAM:  GENERAL: NAD  HEENT:  anicteric, moist mucous membranes; L frontal hematoma  CHEST/LUNG:  CTA b/l, no rales, wheezes, or rhonchi  HEART:  RRR, S1, S2, +systolic murmur  ABDOMEN:  BS+, soft, nontender, nondistended  EXTREMITIES: no edema, cyanosis, or calf tenderness; L elbow ecchymosis. tender to palpation of L hip.  NERVOUS SYSTEM: answers questions and follows commands appropriately    LABS:                        9.4    8.28  )-----------( 203      ( 25 May 2022 08:53 )             30.0     CBC Full  -  ( 25 May 2022 08:53 )  WBC Count : 8.28 K/uL  Hemoglobin : 9.4 g/dL  Hematocrit : 30.0 %  Platelet Count - Automated : 203 K/uL  Mean Cell Volume : 96.5 fl  Mean Cell Hemoglobin : 30.2 pg  Mean Cell Hemoglobin Concentration : 31.3 gm/dL  Auto Neutrophil # : 4.41 K/uL  Auto Lymphocyte # : 2.54 K/uL  Auto Monocyte # : 1.19 K/uL  Auto Eosinophil # : 0.05 K/uL  Auto Basophil # : 0.05 K/uL  Auto Neutrophil % : 53.2 %  Auto Lymphocyte % : 30.7 %  Auto Monocyte % : 14.4 %  Auto Eosinophil % : 0.6 %  Auto Basophil % : 0.6 %    25 May 2022 08:53    140    |  105    |  45     ----------------------------<  82     4.8     |  29     |  1.80     Ca    8.8        25 May 2022 08:53  Phos  4.5       25 May 2022 08:53  Mg     2.5       25 May 2022 08:53    TPro  7.4    /  Alb  3.6    /  TBili  0.4    /  DBili  x      /  AST  24     /  ALT  21     /  AlkPhos  48     24 May 2022 21:33    PT/INR - ( 25 May 2022 08:53 )   PT: 12.3 sec;   INR: 1.05 ratio         PTT - ( 25 May 2022 08:53 )  PTT:31.5 sec    CAPILLARY BLOOD GLUCOSE              RADIOLOGY & ADDITIONAL TESTS:    Personally reviewed.     Consultant(s) Notes Reviewed:  [x] YES  [ ] NO

## 2022-05-25 NOTE — CHART NOTE - NSCHARTNOTEFT_GEN_A_CORE
Called by RN for postoperative Hgb of 6.9g/dL. Patient is awake and alert without complaints, wants to sleep. BP noted to be soft (has history of hypotension and has been running on the lower end even preoperatively). Patient is normocardic. Called daughter Dot Villela [phone] and informed of plan of transfusion, and she is in agreement. Patient is DNR/DNI, confirmed with daughter and order reinstated. Alerted orthopedic team of plan as well.    Vital Signs Last 24 Hrs  T(C): 36.3 (25 May 2022 22:18), Max: 36.8 (25 May 2022 20:01)  T(F): 97.4 (25 May 2022 22:18), Max: 98.2 (25 May 2022 20:01)  HR: 73 (25 May 2022 22:18) (61 - 85)  BP: 103/35 (25 May 2022 22:18) (96/37 - 159/48)  RR: 18 (25 May 2022 22:18) (14 - 20)  SpO2: 95% (25 May 2022 22:18) (95% - 100%)    Impression:  Acute blood loss anemia as cause of postoperative anemia  Intertrochanteric fracture of left hip s/p IMN (POD# 0)  GIL/CKD  Valvular disease/HFpEF  Leukocytosis    Transfuse 1U PRBC at this time and assess response.  May need more blood products to keep Hgb >8, will monitor.  Post-transfusion evaluation given valvular disease and HFpEF.  ECHO with LVEF 60%, calcified trileaflet aortic valve with moderate AS moderate AI. Mild MS with mild to moderate MR.  Would not expect major bleeding given nature of surgery (IMN) - but needs close monitoring.  Monitor for hematoma expansion, appreciate surgical site evaluations by orthopedics.  Can consider iron, b12 and folate supplementation as well. Preoperative anemia work up noted.  Monitor hemodynamics and assess volume status/need for further transfusion.  On heparin subQ VTE prophylaxis to start 5/26 per ortho - assess morning H/H prior to giving first dose.  Leukocytosis is likely reactive from surgery. No evidence to suggest acute infectious process. Trend.  Discussed with orthopedics & resident team. Called by RN for postoperative Hgb of 6.9g/dL. Patient is awake and alert without complaints, wants to sleep. BP noted to be soft (has history of hypotension and has been running on the lower end even preoperatively). Patient is normocardic. Called daughter Dot Villela [phone] and informed of plan of transfusion, and she is in agreement. Patient is DNR/DNI, confirmed with daughter and order reinstated. Alerted orthopedic team of plan as well.    Vital Signs Last 24 Hrs  T(C): 36.3 (25 May 2022 22:18), Max: 36.8 (25 May 2022 20:01)  T(F): 97.4 (25 May 2022 22:18), Max: 98.2 (25 May 2022 20:01)  HR: 73 (25 May 2022 22:18) (61 - 85)  BP: 103/35 (25 May 2022 22:18) (96/37 - 159/48)  RR: 18 (25 May 2022 22:18) (14 - 20)  SpO2: 95% (25 May 2022 22:18) (95% - 100%)    Impression:  Acute blood loss anemia as cause of postoperative anemia  Intertrochanteric fracture of left hip s/p IMN (POD# 0)  GIL/CKD  Valvular disease/HFpEF  Leukocytosis    Transfuse 1U PRBC at this time and assess response.  May need more blood products to keep Hgb >8, will monitor.  Post-transfusion evaluation given valvular disease and HFpEF.  ECHO with LVEF 60%, calcified trileaflet aortic valve with moderate AS moderate AI. Mild MS with mild to moderate MR.  Would not expect major bleeding given nature of surgery (IMN) - but needs close monitoring.  Monitor for hematoma expansion, appreciate surgical site evaluations by orthopedics.  Can consider iron, b12 and folate supplementation as well. Preoperative anemia work up noted.  Monitor hemodynamics and assess volume status/need for further transfusion.  On heparin subQ VTE prophylaxis to start 5/26 per orthopedics - assess morning H/H prior to giving first dose.  Leukocytosis is likely reactive from surgery. No evidence to suggest acute infectious process. Trend WBC on daily CBC.  Discussed with orthopedics & resident team.

## 2022-05-25 NOTE — DISCHARGE NOTE PROVIDER - NSDCMRMEDTOKEN_GEN_ALL_CORE_FT
amLODIPine 5 mg oral tablet: 1 tab(s) orally once a day  citalopram 40 mg oral tablet: 1 tab(s) orally once a day  Lasix 20 mg oral tablet: 1 tab(s) orally once a day  levothyroxine 50 mcg (0.05 mg) oral tablet: 1 tab(s) orally once a day  metoprolol succinate 50 mg oral tablet, extended release: 1 tab(s) orally once a day  omeprazole 20 mg oral delayed release capsule: 1 cap(s) orally once a day  potassium chloride 8 mEq (600 mg) oral capsule, extended release: 1 cap(s) orally once a day   amLODIPine 5 mg oral tablet: 1 tab(s) orally once a day  citalopram 40 mg oral tablet: 1 tab(s) orally once a day  Lasix 20 mg oral tablet: 1 tab(s) orally once a day  levothyroxine 75 mcg (0.075 mg) oral tablet: 1 tab(s) orally once a day  metoprolol succinate 50 mg oral tablet, extended release: 1 tab(s) orally once a day  Multiple Vitamins with Minerals oral tablet: 1 tab(s) orally once a day  omeprazole 20 mg oral delayed release capsule: 1 cap(s) orally once a day  potassium chloride 8 mEq (600 mg) oral capsule, extended release: 1 cap(s) orally once a day   amLODIPine 5 mg oral tablet: 1 tab(s) orally once a day  citalopram 40 mg oral tablet: 1 tab(s) orally once a day  Lasix 20 mg oral tablet: 1 tab(s) orally every other day  levothyroxine 75 mcg (0.075 mg) oral tablet: 1 tab(s) orally once a day  metoprolol succinate 50 mg oral tablet, extended release: 1 tab(s) orally once a day  Multiple Vitamins with Minerals oral tablet: 1 tab(s) orally once a day  omeprazole 20 mg oral delayed release capsule: 1 cap(s) orally once a day  potassium chloride 8 mEq (600 mg) oral capsule, extended release: 1 cap(s) orally once a day  traMADol 50 mg oral tablet: 1 tab(s) orally every 6 hours, As needed, Mild Pain (1 - 3)  traMADol 50 mg oral tablet: 2 tab(s) orally every 6 hours, As needed, Moderate Pain (4 - 6)

## 2022-05-25 NOTE — DISCHARGE NOTE PROVIDER - DETAILS OF MALNUTRITION DIAGNOSIS/DIAGNOSES
This patient has been assessed with a concern for Malnutrition and was treated during this hospitalization for the following Nutrition diagnosis/diagnoses:     -  05/26/2022: Severe protein-calorie malnutrition   -  05/26/2022: Underweight (BMI < 19)

## 2022-05-25 NOTE — DISCHARGE NOTE PROVIDER - CARE PROVIDERS DIRECT ADDRESSES
,DirectAddress_Unknown ,DirectAddress_Unknown,[email or web address] ,DirectAddress_Unknown,[email or web address],DirectAddress_Unknown

## 2022-05-25 NOTE — PROGRESS NOTE ADULT - ASSESSMENT
89 y/o female w/ PMHx of HTN, HF (unknown EF), hypotension, depression and anxiety presents after having a fall earlier today. Patient admitted for L hip fracture, plan for OR today

## 2022-05-25 NOTE — PATIENT PROFILE ADULT - VISION (WITH CORRECTIVE LENSES IF THE PATIENT USUALLY WEARS THEM):
Wears reading glasses-But did not bring with her/Partially impaired: cannot see medication labels or newsprint, but can see obstacles in path, and the surrounding layout; can count fingers at arm's length

## 2022-05-25 NOTE — ED ADULT NURSE REASSESSMENT NOTE - NS ED NURSE REASSESS COMMENT FT1
0717:  received patient.  she is alert / oriented x4.  bandage noted to left forehead.  not complaining of any discomfort to head, but is having some pain to hip region.  More than anything, she states she is tired and has been unable to rest / sleep while in the er.  fluids infusing LR at 60ml/hour.  No respiratory distress noted.  awaiting bed assignment.  lonny hall.

## 2022-05-25 NOTE — CONSULT NOTE ADULT - SUBJECTIVE AND OBJECTIVE BOX
HPI:  89 y/o female w/ PMHx of HTN, HF (unknown EF), hypertension, depression and anxiety presents after having a fall earlier today. Patient chocked on something, was coughing,  went to bathroom and suddenly fell. She hit head in the frontal side, L elbow and hip. Had an episode of urinary incontinence after fall. Patient's son helped to get out from the floor, denies LOC. Patient denies presyncopal symptoms. Patient has multiple falls in the last couple of weeks due to unstable gait, however patient refuse to use walker per son, last fall was 3 days ago. Patent endorses poor appetite has 2 meals per day. Has constipation, not able to remember when was the last BM. She has chronic SIDHU.   At this time she denies headaches, dizziness, nausea, vomiting, chest pain, abdominal pain, fever, hematuria, melena, hematochezia or other symptoms.   Patient has been living with her son for the last 3 months.       In ED:   Vitals: 97.6F, HR 66, 134/43, RR 15, 98% on RA   Labs significant for: WBC 10.97, hgb 10.1, Cr 1.9 (baseline unknown), BUN 44, BNP 2743    CT head and c spine: scalp hematoma w/o intracranial hemorrhage, no c spine fracture  CT hip: Comminuted, displaced fracture of the greater trochanter of the femur. There appears to be extension of the fracture into the intertrochanteric region of the left femur.  EKG: NSR @ 68 bpm   Received Ofirmev 1000mg x1, ofirmev 500mg x1 and Ortho consulted in the ED     (24 May 2022 22:21)    PERTINENT PM/SXH:   Hypothyroid    HTN (hypertension)    HF (heart failure)    Anxiety and depression      No significant past surgical history    S/P hysterectomy    FH: cholecystectomy      FAMILY HISTORY:  FH: heart disease (Father)      ITEMS NOT CHECKED ARE NOT PRESENT    SOCIAL HISTORY:   Significant other/partner[ ]  Children[ ]  Catholic/Spirituality:  Substance hx:  [ ]   Tobacco hx:  [ ]   Alcohol hx: [ ]   Home Opioid hx:  [ ] I-Stop Reference No:  Living Situation: [ ]Home  [ ]Long term care  [ ]Rehab [ ]Other    ADVANCE DIRECTIVES:    DNR  MOLST  [ ]  Living Will  [ ]   DECISION MAKER(s):  [ ] Health Care Proxy(s)  [ ] Surrogate(s)  [ ] Guardian           Name(s): Phone Number(s):    BASELINE (I)ADL(s) (prior to admission):  [place]: [ ]Total  [ ] Moderate [ ]Dependent    Allergies    sulfa drugs (Nausea)    Intolerances    codeine (Nausea)  MEDICATIONS  (STANDING):  amLODIPine   Tablet 5 milliGRAM(s) Oral daily  citalopram 40 milliGRAM(s) Oral daily  lactated ringers. 1000 milliLiter(s) (60 mL/Hr) IV Continuous <Continuous>  levothyroxine 50 MICROGram(s) Oral daily  metoprolol succinate ER 50 milliGRAM(s) Oral daily  pantoprazole    Tablet 40 milliGRAM(s) Oral before breakfast  senna 2 Tablet(s) Oral at bedtime    MEDICATIONS  (PRN):  acetaminophen     Tablet .. 650 milliGRAM(s) Oral every 6 hours PRN Temp greater or equal to 38C (100.4F), Mild Pain (1 - 3)  aluminum hydroxide/magnesium hydroxide/simethicone Suspension 30 milliLiter(s) Oral every 4 hours PRN Dyspepsia  melatonin 3 milliGRAM(s) Oral at bedtime PRN Insomnia  ondansetron Injectable 4 milliGRAM(s) IV Push every 8 hours PRN Nausea and/or Vomiting  polyethylene glycol 3350 17 Gram(s) Oral daily PRN Constipation  traMADol 25 milliGRAM(s) Oral every 6 hours PRN Moderate Pain (4 - 6)  traMADol 50 milliGRAM(s) Oral every 6 hours PRN Severe Pain (7 - 10)    PRESENT SYMPTOMS: [ ]Unable to obtain due to poor mentation   Source if other than patient:  [ ]Family   [ ]Team     Pain: [ ]yes [ ]no  QOL impact -   Location -                    Aggravating factors -  Quality -  Radiation -  Timing-  Severity (0-10 scale):  Minimal acceptable level (0-10 scale):     CPOT:    https://www.[facility].org/getattachment/cmb43m60-6p2n-6n0x-4h0s-8661n6261p2y/Critical-Care-Pain-Observation-Tool-(CPOT)      PAIN AD Score:     http://geriatrictoolkit.[email or web address]/cog/painad.pdf (press ctrl +  left click to view)    Dyspnea:                           [ ]Mild [ ]Moderate [ ]Severe  Anxiety:                             [ ]Mild [ ]Moderate [ ]Severe  Fatigue:                             [ ]Mild [ ]Moderate [ ]Severe  Nausea:                             [ ]Mild [ ]Moderate [ ]Severe  Loss of appetite:              [ ]Mild [ ]Moderate [ ]Severe  Constipation:                    [ ]Mild [ ]Moderate [ ]Severe    Other Symptoms:  [ ]All other review of systems negative     Palliative Performance Status Version 2:         %    http://[email or web address]/[email or web address]_performance_[email or web address]_[email or web address]  PHYSICAL EXAM:  Vital Signs Last 24 Hrs  T(C): 36.7 (25 May 2022 13:01), Max: 36.7 (24 May 2022 22:15)  T(F): 98 (25 May 2022 13:01), Max: 98 (24 May 2022 22:15)  HR: 63 (25 May 2022 13:01) (61 - 71)  BP: 112/42 (25 May 2022 13:01) (103/48 - 134/43)  BP(mean): --  RR: 17 (25 May 2022 13:01) (15 - 17)  SpO2: 95% (25 May 2022 13:01) (95% - 99%) I&O's Summary    GENERAL: no acute distress  HEAD: Left frontal hematoma  EYES: sclera clear, no exudates  ENMT: oropharynx clear without erythema, no exudates, moist mucous membranes. [place]  LUNGS: good air entry bilaterally, clear to auscultation, symmetric breath sounds, no wheezing or rhonchi appreciated  HEART: soft S1/S2, regular rate and rhythm, systolic noted, no lower extremity edema  GASTROINTESTINAL: abdomen is soft, nontender, nondistended, normoactive bowel sounds, no palpable masses  INTEGUMENT: Ecchymosis in the Left elbow and b/l LE   MUSCULOSKELETAL: no clubbing or cyanosis, no obvious deformity  NEUROLOGIC: awake, alert, oriented x3, no obvious sensory deficits  PSYCHIATRIC: mood is good, affect is congruent, linear and logical thought process  HEME/LYMPH: no palpable supraclavicular nodules    LABS:                        9.4    8.28  )-----------( 203      ( 25 May 2022 08:53 )             30.0   05-25    140  |  105  |  45<H>  ----------------------------<  82  4.8   |  29  |  1.80<H>    Ca    8.8      25 May 2022 08:53  Phos  4.5     05-25  Mg     2.5     05-25    TPro  7.4  /  Alb  3.6  /  TBili  0.4  /  DBili  x   /  AST  24  /  ALT  21  /  AlkPhos  48  05-24  PT/INR - ( 25 May 2022 08:53 )   PT: 12.3 sec;   INR: 1.05 ratio         PTT - ( 25 May 2022 08:53 )  PTT:31.5 sec      RADIOLOGY & ADDITIONAL STUDIES: reviewed    PROTEIN CALORIE MALNUTRITION PRESENT: [ ]mild [ ]moderate [ ]severe [ ]underweight [ ]morbid obesity  https://www.andeal.org/vault/2440/web/files/ONC/Table_Clinical%20Characteristics%20to%20Document%20Malnutrition-White%20JV%20et%20al%202012.pdf    Height (cm): 152.4 (05-24-22 @ 19:42)  Weight (kg): 30.8 (05-24-22 @ 19:42)  BMI (kg/m2): 13.3 (05-24-22 @ 19:42)    [ x]PPSV2 < or = to 30% [ ]significant weight loss  [ ]poor nutritional intake  [ ]anasarca      [ ]Artificial Nutrition      REFERRALS:   [ ]Chaplaincy  [ ]Hospice  [ ]Child Life  [ ]Social Work  [ ]Case management [ ]Holistic Therapy     Goals of Care Document:

## 2022-05-25 NOTE — DISCHARGE NOTE PROVIDER - PROVIDER TOKENS
PROVIDER:[TOKEN:[[id number]]] PROVIDER:[TOKEN:[[id number]]],PROVIDER:[TOKEN:[[id number]],FOLLOWUP:[1 week]] PROVIDER:[TOKEN:[[id number]],FOLLOWUP:[1 week]],PROVIDER:[TOKEN:[[id number]],FOLLOWUP:[1 week]],PROVIDER:[TOKEN:[[id number]],FOLLOWUP:[2 weeks]]

## 2022-05-25 NOTE — PROGRESS NOTE ADULT - PROBLEM SELECTOR PLAN 4
Patient reports history of dysphagia, last night choked on something   - Will consult S&S  - NPO for now; will evaluate on 5/26

## 2022-05-26 LAB
ANION GAP SERPL CALC-SCNC: 11 MMOL/L — SIGNIFICANT CHANGE UP (ref 5–17)
ANION GAP SERPL CALC-SCNC: 7 MMOL/L — SIGNIFICANT CHANGE UP (ref 5–17)
APPEARANCE UR: CLEAR — SIGNIFICANT CHANGE UP
BASOPHILS # BLD AUTO: 0.02 K/UL — SIGNIFICANT CHANGE UP (ref 0–0.2)
BASOPHILS NFR BLD AUTO: 0.1 % — SIGNIFICANT CHANGE UP (ref 0–2)
BILIRUB UR-MCNC: NEGATIVE — SIGNIFICANT CHANGE UP
BUN SERPL-MCNC: 58 MG/DL — HIGH (ref 7–23)
BUN SERPL-MCNC: 64 MG/DL — HIGH (ref 7–23)
CALCIUM SERPL-MCNC: 7.8 MG/DL — LOW (ref 8.5–10.1)
CALCIUM SERPL-MCNC: 8 MG/DL — LOW (ref 8.5–10.1)
CHLORIDE SERPL-SCNC: 103 MMOL/L — SIGNIFICANT CHANGE UP (ref 96–108)
CHLORIDE SERPL-SCNC: 105 MMOL/L — SIGNIFICANT CHANGE UP (ref 96–108)
CO2 SERPL-SCNC: 24 MMOL/L — SIGNIFICANT CHANGE UP (ref 22–31)
CO2 SERPL-SCNC: 27 MMOL/L — SIGNIFICANT CHANGE UP (ref 22–31)
COLOR SPEC: SIGNIFICANT CHANGE UP
CREAT SERPL-MCNC: 2.6 MG/DL — HIGH (ref 0.5–1.3)
CREAT SERPL-MCNC: 3.1 MG/DL — HIGH (ref 0.5–1.3)
DIFF PNL FLD: ABNORMAL
EGFR: 13 ML/MIN/1.73M2 — LOW
EGFR: 16 ML/MIN/1.73M2 — LOW
EOSINOPHIL # BLD AUTO: 0 K/UL — SIGNIFICANT CHANGE UP (ref 0–0.5)
EOSINOPHIL NFR BLD AUTO: 0 % — SIGNIFICANT CHANGE UP (ref 0–6)
EPI CELLS # UR: SIGNIFICANT CHANGE UP
GLUCOSE SERPL-MCNC: 122 MG/DL — HIGH (ref 70–99)
GLUCOSE SERPL-MCNC: 173 MG/DL — HIGH (ref 70–99)
GLUCOSE UR QL: NEGATIVE — SIGNIFICANT CHANGE UP
HCT VFR BLD CALC: 27.2 % — LOW (ref 34.5–45)
HCT VFR BLD CALC: 30.7 % — LOW (ref 34.5–45)
HGB BLD-MCNC: 10.2 G/DL — LOW (ref 11.5–15.5)
HGB BLD-MCNC: 8.7 G/DL — LOW (ref 11.5–15.5)
IMM GRANULOCYTES NFR BLD AUTO: 0.7 % — SIGNIFICANT CHANGE UP (ref 0–1.5)
KETONES UR-MCNC: NEGATIVE — SIGNIFICANT CHANGE UP
LEUKOCYTE ESTERASE UR-ACNC: NEGATIVE — SIGNIFICANT CHANGE UP
LYMPHOCYTES # BLD AUTO: 1.08 K/UL — SIGNIFICANT CHANGE UP (ref 1–3.3)
LYMPHOCYTES # BLD AUTO: 7.1 % — LOW (ref 13–44)
MCHC RBC-ENTMCNC: 29.6 PG — SIGNIFICANT CHANGE UP (ref 27–34)
MCHC RBC-ENTMCNC: 30.1 PG — SIGNIFICANT CHANGE UP (ref 27–34)
MCHC RBC-ENTMCNC: 32 GM/DL — SIGNIFICANT CHANGE UP (ref 32–36)
MCHC RBC-ENTMCNC: 33.2 GM/DL — SIGNIFICANT CHANGE UP (ref 32–36)
MCV RBC AUTO: 90.6 FL — SIGNIFICANT CHANGE UP (ref 80–100)
MCV RBC AUTO: 92.5 FL — SIGNIFICANT CHANGE UP (ref 80–100)
MONOCYTES # BLD AUTO: 1.14 K/UL — HIGH (ref 0–0.9)
MONOCYTES NFR BLD AUTO: 7.5 % — SIGNIFICANT CHANGE UP (ref 2–14)
NEUTROPHILS # BLD AUTO: 12.78 K/UL — HIGH (ref 1.8–7.4)
NEUTROPHILS NFR BLD AUTO: 84.6 % — HIGH (ref 43–77)
NITRITE UR-MCNC: NEGATIVE — SIGNIFICANT CHANGE UP
NRBC # BLD: 0 /100 WBCS — SIGNIFICANT CHANGE UP (ref 0–0)
NRBC # BLD: 0 /100 WBCS — SIGNIFICANT CHANGE UP (ref 0–0)
PH UR: 6 — SIGNIFICANT CHANGE UP (ref 5–8)
PLATELET # BLD AUTO: 193 K/UL — SIGNIFICANT CHANGE UP (ref 150–400)
PLATELET # BLD AUTO: 198 K/UL — SIGNIFICANT CHANGE UP (ref 150–400)
POTASSIUM SERPL-MCNC: 5.6 MMOL/L — HIGH (ref 3.5–5.3)
POTASSIUM SERPL-MCNC: 5.8 MMOL/L — HIGH (ref 3.5–5.3)
POTASSIUM SERPL-SCNC: 5.6 MMOL/L — HIGH (ref 3.5–5.3)
POTASSIUM SERPL-SCNC: 5.8 MMOL/L — HIGH (ref 3.5–5.3)
PROT UR-MCNC: 15
RBC # BLD: 2.94 M/UL — LOW (ref 3.8–5.2)
RBC # BLD: 3.39 M/UL — LOW (ref 3.8–5.2)
RBC # FLD: 15.5 % — HIGH (ref 10.3–14.5)
RBC # FLD: 16.1 % — HIGH (ref 10.3–14.5)
RBC CASTS # UR COMP ASSIST: SIGNIFICANT CHANGE UP /HPF (ref 0–4)
SODIUM SERPL-SCNC: 137 MMOL/L — SIGNIFICANT CHANGE UP (ref 135–145)
SODIUM SERPL-SCNC: 140 MMOL/L — SIGNIFICANT CHANGE UP (ref 135–145)
SP GR SPEC: 1.01 — SIGNIFICANT CHANGE UP (ref 1.01–1.02)
UROBILINOGEN FLD QL: NEGATIVE — SIGNIFICANT CHANGE UP
WBC # BLD: 13.58 K/UL — HIGH (ref 3.8–10.5)
WBC # BLD: 15.12 K/UL — HIGH (ref 3.8–10.5)
WBC # FLD AUTO: 13.58 K/UL — HIGH (ref 3.8–10.5)
WBC # FLD AUTO: 15.12 K/UL — HIGH (ref 3.8–10.5)
WBC UR QL: SIGNIFICANT CHANGE UP

## 2022-05-26 PROCEDURE — 99233 SBSQ HOSP IP/OBS HIGH 50: CPT | Mod: GC

## 2022-05-26 PROCEDURE — 99232 SBSQ HOSP IP/OBS MODERATE 35: CPT

## 2022-05-26 RX ORDER — SODIUM ZIRCONIUM CYCLOSILICATE 10 G/10G
5 POWDER, FOR SUSPENSION ORAL ONCE
Refills: 0 | Status: COMPLETED | OUTPATIENT
Start: 2022-05-26 | End: 2022-05-26

## 2022-05-26 RX ORDER — SODIUM ZIRCONIUM CYCLOSILICATE 10 G/10G
10 POWDER, FOR SUSPENSION ORAL ONCE
Refills: 0 | Status: DISCONTINUED | OUTPATIENT
Start: 2022-05-26 | End: 2022-05-26

## 2022-05-26 RX ORDER — HEPARIN SODIUM 5000 [USP'U]/ML
5000 INJECTION INTRAVENOUS; SUBCUTANEOUS EVERY 8 HOURS
Refills: 0 | Status: DISCONTINUED | OUTPATIENT
Start: 2022-05-26 | End: 2022-05-29

## 2022-05-26 RX ORDER — FUROSEMIDE 40 MG
40 TABLET ORAL ONCE
Refills: 0 | Status: DISCONTINUED | OUTPATIENT
Start: 2022-05-26 | End: 2022-05-26

## 2022-05-26 RX ORDER — MULTIVIT-MIN/FERROUS GLUCONATE 9 MG/15 ML
1 LIQUID (ML) ORAL DAILY
Refills: 0 | Status: CANCELLED | OUTPATIENT
Start: 2022-05-26 | End: 2022-05-31

## 2022-05-26 RX ORDER — SODIUM CHLORIDE 9 MG/ML
1000 INJECTION, SOLUTION INTRAVENOUS
Refills: 0 | Status: DISCONTINUED | OUTPATIENT
Start: 2022-05-26 | End: 2022-05-29

## 2022-05-26 RX ORDER — SODIUM ZIRCONIUM CYCLOSILICATE 10 G/10G
10 POWDER, FOR SUSPENSION ORAL
Refills: 0 | Status: COMPLETED | OUTPATIENT
Start: 2022-05-26 | End: 2022-05-27

## 2022-05-26 RX ORDER — SODIUM CHLORIDE 9 MG/ML
1000 INJECTION, SOLUTION INTRAVENOUS
Refills: 0 | Status: DISCONTINUED | OUTPATIENT
Start: 2022-05-26 | End: 2022-05-26

## 2022-05-26 RX ADMIN — CITALOPRAM 40 MILLIGRAM(S): 10 TABLET, FILM COATED ORAL at 11:40

## 2022-05-26 RX ADMIN — SENNA PLUS 2 TABLET(S): 8.6 TABLET ORAL at 21:21

## 2022-05-26 RX ADMIN — SODIUM ZIRCONIUM CYCLOSILICATE 10 GRAM(S): 10 POWDER, FOR SUSPENSION ORAL at 19:50

## 2022-05-26 RX ADMIN — PANTOPRAZOLE SODIUM 40 MILLIGRAM(S): 20 TABLET, DELAYED RELEASE ORAL at 05:29

## 2022-05-26 RX ADMIN — HEPARIN SODIUM 5000 UNIT(S): 5000 INJECTION INTRAVENOUS; SUBCUTANEOUS at 15:01

## 2022-05-26 RX ADMIN — Medication 50 MICROGRAM(S): at 05:29

## 2022-05-26 RX ADMIN — HEPARIN SODIUM 5000 UNIT(S): 5000 INJECTION INTRAVENOUS; SUBCUTANEOUS at 21:21

## 2022-05-26 RX ADMIN — Medication 3 MILLIGRAM(S): at 21:21

## 2022-05-26 RX ADMIN — Medication 100 MILLIGRAM(S): at 04:26

## 2022-05-26 RX ADMIN — Medication 100 MILLIGRAM(S): at 15:00

## 2022-05-26 RX ADMIN — TRAMADOL HYDROCHLORIDE 100 MILLIGRAM(S): 50 TABLET ORAL at 01:20

## 2022-05-26 RX ADMIN — TRAMADOL HYDROCHLORIDE 100 MILLIGRAM(S): 50 TABLET ORAL at 02:10

## 2022-05-26 NOTE — PROGRESS NOTE ADULT - ASSESSMENT
89 y/o female w/ PMHx of HTN, HF (unknown EF), hypotension, depression and anxiety presents after having a fall earlier today. Patient admitted for L hip fracture, s/p L hip IMN on 5/25 with Dr. Winston, course c/b perioperative acute blood loss anemia, s/p 2u PRBC, on heparin subq q8hrs, await normalization of renal indices

## 2022-05-26 NOTE — PROGRESS NOTE ADULT - ASSESSMENT
GIL on CKD  Hyperkalemia  Anemia  PRBC per primary  Loklema x 2  Rubin, strict I&Os  IVF  d/w primary  No emergent indiction for RRT

## 2022-05-26 NOTE — DIETITIAN INITIAL EVALUATION ADULT - PERTINENT MEDS FT
MEDICATIONS  (STANDING):  amLODIPine   Tablet 5 milliGRAM(s) Oral daily  ceFAZolin   IVPB 2000 milliGRAM(s) IV Intermittent every 8 hours  citalopram 40 milliGRAM(s) Oral daily  heparin   Injectable 5000 Unit(s) SubCutaneous every 8 hours  lactated ringers. 1000 milliLiter(s) (75 mL/Hr) IV Continuous <Continuous>  levothyroxine 50 MICROGram(s) Oral daily  melatonin 3 milliGRAM(s) Oral at bedtime  metoprolol succinate ER 50 milliGRAM(s) Oral daily  pantoprazole    Tablet 40 milliGRAM(s) Oral before breakfast  polyethylene glycol 3350 17 Gram(s) Oral at bedtime  senna 2 Tablet(s) Oral at bedtime  sodium zirconium cyclosilicate 5 Gram(s) Oral once    MEDICATIONS  (PRN):  benzocaine 15 mG/menthol 3.6 mG Lozenge 1 Lozenge Oral four times a day PRN Sore Throat  ondansetron    Tablet 4 milliGRAM(s) Oral three times a day PRN Nausea and/or Vomiting  traMADol 100 milliGRAM(s) Oral every 6 hours PRN Moderate Pain (4 - 6)  traMADol 50 milliGRAM(s) Oral every 6 hours PRN Mild Pain (1 - 3)

## 2022-05-26 NOTE — OCCUPATIONAL THERAPY INITIAL EVALUATION ADULT - GENERAL OBSERVATIONS, REHAB EVAL
Patient found supine in bed with +IV, +SCD, +bandage left side of head, +bandage left hip, supplemental 02. Patient unable to tolerate standing due to hypotension and left supine in bed  to receive a blood transfusion.

## 2022-05-26 NOTE — PROGRESS NOTE ADULT - ASSESSMENT
89 y/o female w/ PMHx of HTN, HF (unknown EF), hypertension, depression and anxiety presents after having a fall yesterday, cardio consult requested for cardiac clearance for surgery.    s/p Fall, Cardiac Optimization  - Fall appeared to be mechanical in nature.  Now s/p Left hip IMN.  Tolerated well with no obvious cardiac complications  - Pain control  - Encourage incentive spirometer  - No evidence of cardiac arrhythmias on tele.  Can D/C  - No clear evidence of acute ischemia or volume overload  - BP well controlled, monitor routine hemodynamics.  - Continue home Amlodipine and Toprol XL  - Continue to hold home Lasix; renal function worsening.  Okay with gentle hydration.  Recommend Renal eval  - Monitor and replete lytes, keep K>4, Mg>2.    - Will continue to follow.    Herminia Carson DNP, NP-C  Cardiology   Spectra #[phone]

## 2022-05-26 NOTE — SWALLOW BEDSIDE ASSESSMENT ADULT - SLP PERTINENT HISTORY OF CURRENT PROBLEM
Per charting, "89 y/o female w/ PMHx of HTN, HF (unknown EF), hypotension, depression and anxiety presents after having a fall earlier today. Patient admitted for L hip fracture, s/p L hip IMN on 5/25 with Dr. Winston, course c/b perioperative acute blood loss anemia, s/p 2u PRBC, on heparin subq q8hrs, await normalization of renal indices."

## 2022-05-26 NOTE — DIETITIAN INITIAL EVALUATION ADULT - NS FNS DIET ORDER
Diet, Regular (05-26-22 @ 07:55)  Diet, Full Liquid (05-25-22 @ 18:50)  Diet, Clear Liquid (05-25-22 @ 18:50)

## 2022-05-26 NOTE — SWALLOW BEDSIDE ASSESSMENT ADULT - COMMENTS
Initial order placed for clinical swallow assessment 5/25; however, pt was NPO for OR. New order placed for swallow assessment this AM. Pt with pending clear liquid diet and regular diet orders. Per discussion with MD, pt cleared for all PO, allow for time post-op to eliminate nausea component. Attempted clinical swallow assessment this PM. Pt awake, daughter present at bedside. Pt and daughter politely declined swallow assessment at this time. Pt's daughter further reported pt has a h/o dysphagia, but would like to proceed with regular diet with known risk. Consider GOC discussion with pt/family re: nutritional intake. Discussed with Dr. Pang's resident [phone]. Please reconsult this service, as pt/family is agreeable.
Order received and chart reviewed. Patient is NPO for procedure per RN (going to OR). SLP contacted Dr. Pang who requests eval be done tomorrow 5/26.

## 2022-05-26 NOTE — PROGRESS NOTE ADULT - PROBLEM SELECTOR PLAN 9
Chronic   - On synthroid 50 mcg qd, will continue   - TSH 9.2 Chronic   - On synthroid 50 mcg qd, will continue   - TSH 9.2, check TFTs in AM

## 2022-05-26 NOTE — PROGRESS NOTE ADULT - SUBJECTIVE AND OBJECTIVE BOX
Patient is a 89y old  Female who presents with a chief complaint of Hip fracture (26 May 2022 10:39)      INTERVAL HPI/OVERNIGHT EVENTS: Patient seen and examined at bedside. Pt is POD#1 from L hip IMN by ortho. Post-op Hb 6.9, pt given stat unit PRBC, repeat Hb 8.7. This AM, pt has no complaints at this time. Oriented x 3. Not endorsing any hip pain. Denies fevers, chills, headache, lightheadedness, chest pain, dyspnea, abdominal pain, n/v/d/c.    MEDICATIONS  (STANDING):  amLODIPine   Tablet 5 milliGRAM(s) Oral daily  ceFAZolin   IVPB 2000 milliGRAM(s) IV Intermittent every 8 hours  citalopram 40 milliGRAM(s) Oral daily  heparin   Injectable 5000 Unit(s) SubCutaneous every 8 hours  lactated ringers. 1000 milliLiter(s) (75 mL/Hr) IV Continuous <Continuous>  levothyroxine 50 MICROGram(s) Oral daily  melatonin 3 milliGRAM(s) Oral at bedtime  metoprolol succinate ER 50 milliGRAM(s) Oral daily  pantoprazole    Tablet 40 milliGRAM(s) Oral before breakfast  polyethylene glycol 3350 17 Gram(s) Oral at bedtime  senna 2 Tablet(s) Oral at bedtime  sodium zirconium cyclosilicate 5 Gram(s) Oral once    MEDICATIONS  (PRN):  benzocaine 15 mG/menthol 3.6 mG Lozenge 1 Lozenge Oral four times a day PRN Sore Throat  ondansetron    Tablet 4 milliGRAM(s) Oral three times a day PRN Nausea and/or Vomiting  traMADol 100 milliGRAM(s) Oral every 6 hours PRN Moderate Pain (4 - 6)  traMADol 50 milliGRAM(s) Oral every 6 hours PRN Mild Pain (1 - 3)      Allergies    sulfa drugs (Nausea)    Intolerances    codeine (Nausea)      REVIEW OF SYSTEMS:  CONSTITUTIONAL: No fever or chills  HEENT:  No headache, no sore throat  RESPIRATORY: No cough, wheezing, or shortness of breath  CARDIOVASCULAR: No chest pain, palpitations  GASTROINTESTINAL: No abd pain, nausea, vomiting, or diarrhea  GENITOURINARY: No dysuria, frequency, or hematuria  NEUROLOGICAL: no focal weakness or dizziness  MUSCULOSKELETAL: no myalgias     Vital Signs Last 24 Hrs  T(C): 36.6 (26 May 2022 05:06), Max: 36.8 (25 May 2022 20:01)  T(F): 97.9 (26 May 2022 05:06), Max: 98.2 (25 May 2022 20:01)  HR: 77 (26 May 2022 05:06) (63 - 85)  BP: 100/50 (26 May 2022 05:06) (96/37 - 159/48)  BP(mean): --  RR: 18 (26 May 2022 05:06) (14 - 20)  SpO2: 98% (26 May 2022 05:06) (95% - 100%)    PHYSICAL EXAM:  GENERAL: NAD, sleepy but arousable, elderly  HEENT:  anicteric, moist mucous membranes; L frontal hematoma  CHEST/LUNG:  CTA b/l, no rales, wheezes, or rhonchi  HEART:  RRR, S1, S2, +systolic murmur  ABDOMEN:  BS+, soft, nontender, nondistended  EXTREMITIES: no edema, cyanosis, or calf tenderness; L elbow ecchymosis. soft compressible LLE, incision c/d/i.  NERVOUS SYSTEM: answers questions and follows commands appropriately    LABS:                        8.7    15.12 )-----------( 198      ( 26 May 2022 06:26 )             27.2     CBC Full  -  ( 26 May 2022 06:26 )  WBC Count : 15.12 K/uL  Hemoglobin : 8.7 g/dL  Hematocrit : 27.2 %  Platelet Count - Automated : 198 K/uL  Mean Cell Volume : 92.5 fl  Mean Cell Hemoglobin : 29.6 pg  Mean Cell Hemoglobin Concentration : 32.0 gm/dL  Auto Neutrophil # : 12.78 K/uL  Auto Lymphocyte # : 1.08 K/uL  Auto Monocyte # : 1.14 K/uL  Auto Eosinophil # : 0.00 K/uL  Auto Basophil # : 0.02 K/uL  Auto Neutrophil % : 84.6 %  Auto Lymphocyte % : 7.1 %  Auto Monocyte % : 7.5 %  Auto Eosinophil % : 0.0 %  Auto Basophil % : 0.1 %    26 May 2022 06:26    140    |  105    |  58     ----------------------------<  173    5.6     |  24     |  2.60     Ca    8.0        26 May 2022 06:26      PT/INR - ( 25 May 2022 08:53 )   PT: 12.3 sec;   INR: 1.05 ratio         PTT - ( 25 May 2022 08:53 )  PTT:31.5 sec    CAPILLARY BLOOD GLUCOSE              RADIOLOGY & ADDITIONAL TESTS:    Personally reviewed.     Consultant(s) Notes Reviewed:  [x] YES  [ ] NO

## 2022-05-26 NOTE — DIETITIAN INITIAL EVALUATION ADULT - PERTINENT LABORATORY DATA
05-26    140  |  105  |  58<H>  ----------------------------<  173<H>  5.6<H>   |  24  |  2.60<H>    Ca    8.0<L>      26 May 2022 06:26  Phos  4.5     05-25  Mg     2.5     05-25    TPro  7.4  /  Alb  3.6  /  TBili  0.4  /  DBili  x   /  AST  24  /  ALT  21  /  AlkPhos  48  05-24

## 2022-05-26 NOTE — PHYSICAL THERAPY INITIAL EVALUATION ADULT - PERTINENT HX OF CURRENT PROBLEM, REHAB EVAL
89 female PMH aortic stenosis, HTN, hypothyroid, anxiety/depression, presents to ER by ambulance with report of fall in the bathroom left hip pain unable to ambulate due to pain. (+) left hip frx, s/p left im nail surgery.

## 2022-05-26 NOTE — DIETITIAN INITIAL EVALUATION ADULT - ADD RECOMMEND
Ensure clear 8oz po TID. MVI with minerals daily.  Additional recommendations on diet texture/liquid consistency per SLP. Ensure clear 8oz po TID. MVI with minerals daily.

## 2022-05-26 NOTE — DIETITIAN INITIAL EVALUATION ADULT - OTHER INFO
Pt A+Ox4 during visit; fatigued/weak. Dx left hip fx s/p intramedullary nailing of femur POD#1. Regular diet rx. Minimal po intake for breakfast 5/26, only consumed few bites roll and cranberry juice. Poor appetite/po intake reported. Some difficulty swallowing with choking incident pta. Plan for SLP evaluation today. No report N/V. BM 5/25, bowel regimen rx. Consumes 2 meals/day, breakfast & dinner. Breakfast typically roll with cheese and dinner various take out meals. UBW 90lbs ~1-2 years ago. States current weight ~68lbs. Admission weight 76lbs. Stated height 60". Pt refusing ensure enlive supplement however agreeable to ensure clear with meals (berry flavor). Additional food preferences/meal alternatives obtained to maximize po intake.

## 2022-05-26 NOTE — PROGRESS NOTE ADULT - SUBJECTIVE AND OBJECTIVE BOX
The patient was interviewed and evaluated  89y Female s/p orif left hip    T(C): 36.6 (05-26-22 @ 05:06), Max: 36.8 (05-25-22 @ 20:01)  HR: 77 (05-26-22 @ 05:06) (63 - 85)  BP: 100/50 (05-26-22 @ 05:06) (96/37 - 159/48)  RR: 18 (05-26-22 @ 05:06) (14 - 20)  SpO2: 98% (05-26-22 @ 05:06) (95% - 100%)  Wt(kg): --    Pt seen, doing well, no anesthesia complications or complaints noted or reported.     No additional recommendations.     Pain well controlled

## 2022-05-26 NOTE — OCCUPATIONAL THERAPY INITIAL EVALUATION ADULT - ADL RETRAINING, OT EVAL
Patient will perform grooming tasks with minimal assistance in 4-6 sessions. Patient will dress upper body with minimal assistance in 4-6 sessions.

## 2022-05-26 NOTE — DIETITIAN NUTRITION RISK NOTIFICATION - TREATMENT: THE FOLLOWING DIET HAS BEEN RECOMMENDED
Diet, Regular (05-26-22 @ 07:55) [Active]  Diet, Clear Liquid (05-25-22 @ 18:50) [Available for Activation]

## 2022-05-26 NOTE — PROGRESS NOTE ADULT - SUBJECTIVE AND OBJECTIVE BOX
[facility] -- Donnell Downey Grossman, [place], Tunde Gracia Savella, Goodger  Office # [phone]    Follow Up:  s/p Fall, Cardiac Optimization     Subjective/Observations: Asleep but arousable.  Alert and oriented.  Denies postop pain.  Denies any form of respiratory or cardiac discomfort    REVIEW OF SYSTEMS: All other review of systems is negative unless indicated above  PAST MEDICAL & SURGICAL HISTORY:  Hypothyroid    HTN (hypertension)  HF (heart failure)  Anxiety and depression  S/P hysterectomy  FH: cholecystectomy    MEDICATIONS  (STANDING):  amLODIPine   Tablet 5 milliGRAM(s) Oral daily  ceFAZolin   IVPB 2000 milliGRAM(s) IV Intermittent every 8 hours  citalopram 40 milliGRAM(s) Oral daily  heparin   Injectable 5000 Unit(s) SubCutaneous every 8 hours  lactated ringers. 1000 milliLiter(s) (75 mL/Hr) IV Continuous <Continuous>  levothyroxine 50 MICROGram(s) Oral daily  melatonin 3 milliGRAM(s) Oral at bedtime  metoprolol succinate ER 50 milliGRAM(s) Oral daily  pantoprazole    Tablet 40 milliGRAM(s) Oral before breakfast  polyethylene glycol 3350 17 Gram(s) Oral at bedtime  senna 2 Tablet(s) Oral at bedtime  sodium zirconium cyclosilicate 5 Gram(s) Oral once    MEDICATIONS  (PRN):  benzocaine 15 mG/menthol 3.6 mG Lozenge 1 Lozenge Oral four times a day PRN Sore Throat  ondansetron    Tablet 4 milliGRAM(s) Oral three times a day PRN Nausea and/or Vomiting  traMADol 100 milliGRAM(s) Oral every 6 hours PRN Moderate Pain (4 - 6)  traMADol 50 milliGRAM(s) Oral every 6 hours PRN Mild Pain (1 - 3)    Allergies    sulfa drugs (Nausea)    Intolerances    codeine (Nausea)    Vital Signs Last 24 Hrs  T(C): 36.6 (26 May 2022 05:06), Max: 36.8 (25 May 2022 20:01)  T(F): 97.9 (26 May 2022 05:06), Max: 98.2 (25 May 2022 20:01)  HR: 77 (26 May 2022 05:06) (63 - 85)  BP: 100/50 (26 May 2022 05:06) (96/37 - 159/48)  BP(mean): --  RR: 18 (26 May 2022 05:06) (14 - 20)  SpO2: 98% (26 May 2022 05:06) (95% - 100%)  I&O's Summary    25 May 2022 07:01  -  26 May 2022 07:00  --------------------------------------------------------  IN: 705 mL / OUT: 0 mL / NET: 705 mL    Weight (kg): 34.6 (05-25 @ 17:14)'    PHYSICAL EXAM:  TELE: NSR  Constitutional: NAD, awake and alert, frail  HEENT: Moist Mucous Membranes, Anicteric  Pulmonary: Non-labored, breath sounds are clear bilaterally, No wheezing, rales or rhonchi  Cardiovascular: Regular, S1 and S2, + murmurs, no rubs, gallops or clicks  Gastrointestinal: Bowel Sounds present, soft, nontender.   Lymph: No peripheral edema. No lymphadenopathy.  Skin: No visible rashes or ulcers.  Left hip dressing dry and intact  Psych:  Mood & affect: Flat  LABS: All Labs Reviewed:                        8.7    15.12 )-----------( 198      ( 26 May 2022 06:26 )             27.2                         6.9    14.95 )-----------( 230      ( 25 May 2022 21:35 )             22.2                         9.4    8.28  )-----------( 203      ( 25 May 2022 08:53 )             30.0     26 May 2022 06:26    140    |  105    |  58     ----------------------------<  173    5.6     |  24     |  2.60   25 May 2022 21:35    142    |  107    |  48     ----------------------------<  83     4.8     |  25     |  2.00   25 May 2022 08:53    140    |  105    |  45     ----------------------------<  82     4.8     |  29     |  1.80     Ca    8.0        26 May 2022 06:26  Ca    8.2        25 May 2022 21:35  Ca    8.8        25 May 2022 08:53  Phos  4.5       25 May 2022 08:53  Mg     2.5       25 May 2022 08:53    TPro  7.4    /  Alb  3.6    /  TBili  0.4    /  DBili  x      /  AST  24     /  ALT  21     /  AlkPhos  48     24 May 2022 21:33    PT/INR - ( 25 May 2022 08:53 )   PT: 12.3 sec;   INR: 1.05 ratio      PTT - ( 25 May 2022 08:53 )  PTT:31.5 sec  CARDIAC MARKERS ( 24 May 2022 21:33 )  x     / x     / x     / x     / <1.0 ng/mL    ACC: [id number] EXAM:  ECHO TTE WO CON COMP W DOPP                          PROCEDURE DATE:  05/25/2022      INTERPRETATION:  INDICATION: Atrial fibrillation  Sonographer     Blood Pressure 109/48    Height 152 cm     Weight 30.8 kg       BSA 1.18   sq m    Dimensions:  LA 2.3       Normal Values: 2.0 - 4.0 cm  Ao 2.9        Normal Values: 2.0 - 3.8 cm  SEPTUM 1.2       Normal Values: 0.6 - 1.2 cm  PWT 0.8       Normal Values: 0.6 - 1.1 cm  LVIDd 3.7         Normal Values: 3.0 - 5.6 cm  LVIDs 2.9         Normal Values: 1.8 - 4.0 cm    OBSERVATIONS:  Mitral Valve: Mitral annular calcification with calcified leaflets. Mean   transmitral valve gradient equals 4 mmHg at a heart rate of 63 beats from   minute. This is consistent with mild mitral stenosis.  Mild to moderate MR  Aortic Valve/Aorta: Calcified trileaflet aortic valve with decreased   opening. Peak transaortic valve gradient is 23 mmHg with a mean   transaortic valve gradient 13.9 mmHg. The aortic valve area is calculated   to be 1.2 sq cm by continuity equation. This is consistent with moderate   aortic stenosis.  Moderate AI  Tricuspid Valve: normal with trace TR.  Pulmonic Valve: Mild PI  Left Atrium: normal  Right Atrium: Not well-visualized  Left Ventricle: normal LV size and systolic function, estimated LVEF of   60%.  Right Ventricle: Grossly normal size and systolic function.  Pericardium: no significant pericardial effusion.    ACC: [id number] EXAM:  XR CHEST AP OR PA 1V                          PROCEDURE DATE:  05/24/2022      INTERPRETATION:  Clinical history: 89-year-old female, fall, left hip   fracture.    Single view of the chest without comparison demonstrates a normal cardiac   silhouette and normal pulmonary vasculature with no consolidation,   effusion, pneumothorax or acute osseous finding.    IMPRESSION:  No acute radiographic findings    --- End of Report ---    VANESSA BAR DO; Attending Radiologist  This document has been electronically signed. May 25 2022  3:20PM    IMPRESSION:  Normal left ventricular internal dimensions and systolic function,   estimated LVEF of 60%.  Grossly normal RV size and systolic function.  Calcified trileaflet aortic valve with moderate aortic stenosis, moderate   AI.  Mild mitral stenosis with mild to moderate mitral regurgitation  No significant pericardialeffusion.    --- End of Report ---    ASHLEY COUCH MD; Attending Cardiologist  This document has been electronically signed. May 25 2022  1:26PM    Ventricular Rate 68 BPM    Atrial Rate 68 BPM    P-R Interval 150 ms    QRS Duration 74 ms    Q-T Interval 456 ms    QTC Calculation(Bazett) 484 ms    P Axis 27 degrees    R Axis 21 degrees    T Axis 53 degrees    Diagnosis Line Normal sinus rhythm  Normal ECG  No previous ECGs available  Confirmed by Louis Coles MD ([id number]) on 5/25/2022 1:26:06 PM

## 2022-05-26 NOTE — PROGRESS NOTE ADULT - SUBJECTIVE AND OBJECTIVE BOX
Patient seen and examined at bedside. Pain is controlled. Reports difficulty sleeping. Denies lightheadedness or dizziness.     Vital Signs Last 24 Hrs  T(C): 36.6 (05-26-22 @ 05:06), Max: 36.8 (05-25-22 @ 20:01)  T(F): 97.9 (05-26-22 @ 05:06), Max: 98.2 (05-25-22 @ 20:01)  HR: 77 (05-26-22 @ 05:06) (61 - 85)  BP: 100/50 (05-26-22 @ 05:06) (96/37 - 159/48)  BP(mean): --  RR: 18 (05-26-22 @ 05:06) (14 - 20)  SpO2: 98% (05-26-22 @ 05:06) (95% - 100%)                        8.7    15.12 )-----------( 198      ( 26 May 2022 06:26 )             27.2     26 May 2022 06:26    140    |  105    |  58     ----------------------------<  173    5.6     |  24     |  2.60     Ca    8.0        26 May 2022 06:26  Phos  4.5       25 May 2022 08:53  Mg     2.5       25 May 2022 08:53    TPro  7.4    /  Alb  3.6    /  TBili  0.4    /  DBili  x      /  AST  24     /  ALT  21     /  AlkPhos  48     24 May 2022 21:33    PT/INR - ( 25 May 2022 08:53 )   PT: 12.3 sec;   INR: 1.05 ratio         PTT - ( 25 May 2022 08:53 )  PTT:31.5 sec    Exam:  Gen: NAD, resting comfortably  LLE  Compressive Dressing c/d/i  +EHL/FHL/TA/GS  SILT deep per/sup per/saph/sural   +DP  Calf NTTP b/l  Compartments soft and compressible    A/P:  89yFemale Stable POD1 L IMN    -Given one unit 5/25, will give second unit today to be transfused over 4 hours  -FU labs  -WBAT  -Pain control  -PT/OT  -Ppx ABX x24hrs  -DVT PE ppx- heparin and SCDs  -Incentive spirometry  -dispo pending PT

## 2022-05-26 NOTE — OCCUPATIONAL THERAPY INITIAL EVALUATION ADULT - PERTINENT HX OF CURRENT PROBLEM, REHAB EVAL
89 y/o female PMH aortic stenosis, HTN, hypothyroid, anxiety/depression, presented to ER by ambulance with report of fall in the bathroom. Patient admitted 5/24 with left hip fx and s/p IM nailing left femur 5/25/22 due to +IT fx. Patient s/p blood transfusion due to post-op anemia.

## 2022-05-26 NOTE — OCCUPATIONAL THERAPY INITIAL EVALUATION ADULT - ADDITIONAL COMMENTS
Pt lives with her dtr and son-in-law in a split level house with 3 steps to enter. Pt ambulated with no devices though dtr reported that patient's gait was "wobbly" and has hx of multiple falls.

## 2022-05-27 ENCOUNTER — TRANSCRIPTION ENCOUNTER (OUTPATIENT)
Age: 87
End: 2022-05-27

## 2022-05-27 LAB
ANION GAP SERPL CALC-SCNC: 10 MMOL/L — SIGNIFICANT CHANGE UP (ref 5–17)
BASOPHILS # BLD AUTO: 0.02 K/UL — SIGNIFICANT CHANGE UP (ref 0–0.2)
BASOPHILS NFR BLD AUTO: 0.2 % — SIGNIFICANT CHANGE UP (ref 0–2)
BUN SERPL-MCNC: 75 MG/DL — HIGH (ref 7–23)
CALCIUM SERPL-MCNC: 7.4 MG/DL — LOW (ref 8.5–10.1)
CHLORIDE SERPL-SCNC: 99 MMOL/L — SIGNIFICANT CHANGE UP (ref 96–108)
CO2 SERPL-SCNC: 25 MMOL/L — SIGNIFICANT CHANGE UP (ref 22–31)
CREAT ?TM UR-MCNC: 69 MG/DL — SIGNIFICANT CHANGE UP
CREAT SERPL-MCNC: 3.5 MG/DL — HIGH (ref 0.5–1.3)
EGFR: 12 ML/MIN/1.73M2 — LOW
EOSINOPHIL # BLD AUTO: 0.03 K/UL — SIGNIFICANT CHANGE UP (ref 0–0.5)
EOSINOPHIL NFR BLD AUTO: 0.3 % — SIGNIFICANT CHANGE UP (ref 0–6)
GLUCOSE SERPL-MCNC: 126 MG/DL — HIGH (ref 70–99)
HCT VFR BLD CALC: 29.2 % — LOW (ref 34.5–45)
HGB BLD-MCNC: 9.5 G/DL — LOW (ref 11.5–15.5)
IMM GRANULOCYTES NFR BLD AUTO: 0.5 % — SIGNIFICANT CHANGE UP (ref 0–1.5)
LYMPHOCYTES # BLD AUTO: 1.41 K/UL — SIGNIFICANT CHANGE UP (ref 1–3.3)
LYMPHOCYTES # BLD AUTO: 13.7 % — SIGNIFICANT CHANGE UP (ref 13–44)
MCHC RBC-ENTMCNC: 30.3 PG — SIGNIFICANT CHANGE UP (ref 27–34)
MCHC RBC-ENTMCNC: 32.5 GM/DL — SIGNIFICANT CHANGE UP (ref 32–36)
MCV RBC AUTO: 93 FL — SIGNIFICANT CHANGE UP (ref 80–100)
MONOCYTES # BLD AUTO: 1.05 K/UL — HIGH (ref 0–0.9)
MONOCYTES NFR BLD AUTO: 10.2 % — SIGNIFICANT CHANGE UP (ref 2–14)
NEUTROPHILS # BLD AUTO: 7.7 K/UL — HIGH (ref 1.8–7.4)
NEUTROPHILS NFR BLD AUTO: 75.1 % — SIGNIFICANT CHANGE UP (ref 43–77)
NRBC # BLD: 0 /100 WBCS — SIGNIFICANT CHANGE UP (ref 0–0)
PLATELET # BLD AUTO: 189 K/UL — SIGNIFICANT CHANGE UP (ref 150–400)
POTASSIUM SERPL-MCNC: 5.1 MMOL/L — SIGNIFICANT CHANGE UP (ref 3.5–5.3)
POTASSIUM SERPL-SCNC: 5.1 MMOL/L — SIGNIFICANT CHANGE UP (ref 3.5–5.3)
RBC # BLD: 3.14 M/UL — LOW (ref 3.8–5.2)
RBC # FLD: 15.8 % — HIGH (ref 10.3–14.5)
SODIUM SERPL-SCNC: 134 MMOL/L — LOW (ref 135–145)
SODIUM UR-SCNC: 91 MMOL/L — SIGNIFICANT CHANGE UP
UUN UR-MCNC: 467 MG/DL — SIGNIFICANT CHANGE UP
WBC # BLD: 10.26 K/UL — SIGNIFICANT CHANGE UP (ref 3.8–10.5)
WBC # FLD AUTO: 10.26 K/UL — SIGNIFICANT CHANGE UP (ref 3.8–10.5)

## 2022-05-27 PROCEDURE — 99232 SBSQ HOSP IP/OBS MODERATE 35: CPT

## 2022-05-27 PROCEDURE — 99233 SBSQ HOSP IP/OBS HIGH 50: CPT | Mod: GC

## 2022-05-27 RX ADMIN — Medication 3 MILLIGRAM(S): at 22:42

## 2022-05-27 RX ADMIN — SENNA PLUS 2 TABLET(S): 8.6 TABLET ORAL at 22:43

## 2022-05-27 RX ADMIN — Medication 50 MICROGRAM(S): at 05:23

## 2022-05-27 RX ADMIN — HEPARIN SODIUM 5000 UNIT(S): 5000 INJECTION INTRAVENOUS; SUBCUTANEOUS at 13:21

## 2022-05-27 RX ADMIN — CITALOPRAM 40 MILLIGRAM(S): 10 TABLET, FILM COATED ORAL at 11:55

## 2022-05-27 RX ADMIN — AMLODIPINE BESYLATE 5 MILLIGRAM(S): 2.5 TABLET ORAL at 05:24

## 2022-05-27 RX ADMIN — Medication 50 MILLIGRAM(S): at 05:23

## 2022-05-27 RX ADMIN — SODIUM CHLORIDE 75 MILLILITER(S): 9 INJECTION, SOLUTION INTRAVENOUS at 05:26

## 2022-05-27 RX ADMIN — POLYETHYLENE GLYCOL 3350 17 GRAM(S): 17 POWDER, FOR SOLUTION ORAL at 22:43

## 2022-05-27 RX ADMIN — HEPARIN SODIUM 5000 UNIT(S): 5000 INJECTION INTRAVENOUS; SUBCUTANEOUS at 22:42

## 2022-05-27 RX ADMIN — HEPARIN SODIUM 5000 UNIT(S): 5000 INJECTION INTRAVENOUS; SUBCUTANEOUS at 05:26

## 2022-05-27 RX ADMIN — PANTOPRAZOLE SODIUM 40 MILLIGRAM(S): 20 TABLET, DELAYED RELEASE ORAL at 05:24

## 2022-05-27 NOTE — CONSULT NOTE ADULT - SUBJECTIVE AND OBJECTIVE BOX
Patient is a 89y old  Female who presents with a chief complaint of Hip fracture (27 May 2022 11:12)       HPI:  89 y/o female w/ PMHx of HTN, HF (unknown EF), hypertension, depression and anxiety presents after having a fall earlier today. Patient chocked on something, was coughing,  went to bathroom and suddenly fell. She hit head in the frontal side, L elbow and hip. Had an episode of urinary incontinence after fall. Patient's son helped to get out from the floor, denies LOC. Patient denies presyncopal symptoms. Patient has multiple falls in the last couple of weeks due to unstable gait, however patient refuse to use walker per son, last fall was 3 days ago. Patent endorses poor appetite has 2 meals per day. Has constipation, not able to remember when was the last BM. She has chronic SIDHU.   At this time she denies headaches, dizziness, nausea, vomiting, chest pain, abdominal pain, fever, hematuria, melena, hematochezia or other symptoms.   Patient has been living with her son for the last 3 months.     Renal consulted for GIL/CKD. S/p OR. +Rubin. Poor appetite. H/o of CKD per family        PAST MEDICAL & SURGICAL HISTORY:  Hypothyroid      HTN (hypertension)      HF (heart failure)      Anxiety and depression      S/P hysterectomy      FH: cholecystectomy           FAMILY HISTORY:  FH: heart disease (Father)    NC    Social History:Non smoker    MEDICATIONS  (STANDING):  amLODIPine   Tablet 5 milliGRAM(s) Oral daily  citalopram 40 milliGRAM(s) Oral daily  dextrose 5% + sodium chloride 0.45%. 1000 milliLiter(s) (75 mL/Hr) IV Continuous <Continuous>  heparin   Injectable 5000 Unit(s) SubCutaneous every 8 hours  levothyroxine 50 MICROGram(s) Oral daily  melatonin 3 milliGRAM(s) Oral at bedtime  metoprolol succinate ER 50 milliGRAM(s) Oral daily  pantoprazole    Tablet 40 milliGRAM(s) Oral before breakfast  polyethylene glycol 3350 17 Gram(s) Oral at bedtime  senna 2 Tablet(s) Oral at bedtime    MEDICATIONS  (PRN):  benzocaine 15 mG/menthol 3.6 mG Lozenge 1 Lozenge Oral four times a day PRN Sore Throat  ondansetron    Tablet 4 milliGRAM(s) Oral three times a day PRN Nausea and/or Vomiting  traMADol 100 milliGRAM(s) Oral every 6 hours PRN Moderate Pain (4 - 6)  traMADol 50 milliGRAM(s) Oral every 6 hours PRN Mild Pain (1 - 3)   Meds reviewed    Allergies    sulfa drugs (Nausea)    Intolerances    codeine (Nausea)       REVIEW OF SYSTEMS: As per HPI, otherwise negative     Vital Signs Last 24 Hrs  T(C): 36.6 (27 May 2022 13:17), Max: 36.8 (26 May 2022 20:45)  T(F): 97.9 (27 May 2022 13:17), Max: 98.2 (26 May 2022 20:45)  HR: 61 (27 May 2022 13:17) (61 - 76)  BP: 109/50 (27 May 2022 13:17) (91/43 - 122/48)  BP(mean): --  RR: 17 (27 May 2022 13:17) (16 - 18)  SpO2: 95% (27 May 2022 13:17) (89% - 98%)  Daily     Daily     PHYSICAL EXAM:  GENERAL: NAD  HEENT:  anicteric, moist mucous membranes  CHEST/LUNG:  CTA b/l, no rales, wheezes, or rhonchi  HEART:  RRR, S1, S2  ABDOMEN:  BS+, soft, nontender, nondistended  EXTREMITIES: L hip incision dressing c/d/i, no pain upon light palpation of L hip, no edema, cyanosis, or calf tenderness  NERVOUS SYSTEM: answers questions and follows commands appropriately      LABS:                        9.5    10.26 )-----------( 189      ( 27 May 2022 09:22 )             29.2     05    134<L>  |  99  |  75<H>  ----------------------------<  126<H>  5.1   |  25  |  3.50<H>    Ca    7.4<L>      27 May 2022 09:22        Urinalysis Basic - ( 26 May 2022 18:51 )    Color: Pale Yellow / Appearance: Clear / S.015 / pH: x  Gluc: x / Ketone: Negative  / Bili: Negative / Urobili: Negative   Blood: x / Protein: 15 / Nitrite: Negative   Leuk Esterase: Negative / RBC: 0-2 /HPF / WBC 0-2   Sq Epi: x / Non Sq Epi: Occasional / Bacteria: x              RADIOLOGY & ADDITIONAL TESTS:

## 2022-05-27 NOTE — CONSULT NOTE ADULT - ASSESSMENT
GIL/CKD  Hip fracture s/p IMN  HTN  Anemia  Cardiomyopathy     -CKDIII-IV with baseline Cr around 2  -GIL with post-op ATN  -Keep Rubin  -Urine studies  -Hold diuretics  -Gentle IVF  -ATN will have to run its course. No indication for RRT at this time. We will monitor closely  -Avoid nephrotoxins. Keep MAP>65    D/w family at bedside    Thank you for involving nephrology for the care of this patient

## 2022-05-27 NOTE — PROGRESS NOTE ADULT - SUBJECTIVE AND OBJECTIVE BOX
Patient is a 89y old  Female who presents with a chief complaint of Hip fracture (27 May 2022 08:39)      INTERVAL HPI/OVERNIGHT EVENTS: Patient seen and examined at bedside. Pt received 1u PRBC yesterday. Pt is now POD #2 s/o L hip IMN. Patient is complaining of a dry mouth and states that she "can't feel" her LLE as much as her RLE, though denies numbness. Denies fevers, chills, headache, lightheadedness, chest pain, dyspnea, abdominal pain, n/v/d/c.    MEDICATIONS  (STANDING):  amLODIPine   Tablet 5 milliGRAM(s) Oral daily  citalopram 40 milliGRAM(s) Oral daily  dextrose 5% + sodium chloride 0.45%. 1000 milliLiter(s) (75 mL/Hr) IV Continuous <Continuous>  heparin   Injectable 5000 Unit(s) SubCutaneous every 8 hours  levothyroxine 50 MICROGram(s) Oral daily  melatonin 3 milliGRAM(s) Oral at bedtime  metoprolol succinate ER 50 milliGRAM(s) Oral daily  pantoprazole    Tablet 40 milliGRAM(s) Oral before breakfast  polyethylene glycol 3350 17 Gram(s) Oral at bedtime  senna 2 Tablet(s) Oral at bedtime    MEDICATIONS  (PRN):  benzocaine 15 mG/menthol 3.6 mG Lozenge 1 Lozenge Oral four times a day PRN Sore Throat  ondansetron    Tablet 4 milliGRAM(s) Oral three times a day PRN Nausea and/or Vomiting  traMADol 100 milliGRAM(s) Oral every 6 hours PRN Moderate Pain (4 - 6)  traMADol 50 milliGRAM(s) Oral every 6 hours PRN Mild Pain (1 - 3)      Allergies    sulfa drugs (Nausea)    Intolerances    codeine (Nausea)      REVIEW OF SYSTEMS:  CONSTITUTIONAL: No fever or chills  HEENT:  No headache, no sore throat  RESPIRATORY: No cough, wheezing, or shortness of breath  CARDIOVASCULAR: No chest pain, palpitations  GASTROINTESTINAL: No abd pain, nausea, vomiting, or diarrhea  GENITOURINARY: No dysuria, frequency, or hematuria  NEUROLOGICAL: LLE feels strange, no focal weakness or dizziness  MUSCULOSKELETAL: 5/5 strength b/l UE & LE, ROM intact     Vital Signs Last 24 Hrs  T(C): 36.5 (27 May 2022 04:43), Max: 36.8 (26 May 2022 20:45)  T(F): 97.7 (27 May 2022 04:43), Max: 98.2 (26 May 2022 20:45)  HR: 64 (27 May 2022 08:28) (64 - 76)  BP: 109/53 (27 May 2022 08:28) (91/43 - 122/48)  BP(mean): --  RR: 16 (27 May 2022 04:43) (16 - 18)  SpO2: 96% (27 May 2022 04:43) (89% - 98%)    PHYSICAL EXAM:  GENERAL: NAD  HEENT:  anicteric, moist mucous membranes  CHEST/LUNG:  CTA b/l, no rales, wheezes, or rhonchi  HEART:  RRR, S1, S2  ABDOMEN:  BS+, soft, nontender, nondistended  EXTREMITIES: L hip incision dressing c/d/i, no pain upon light palpation of L hip, no edema, cyanosis, or calf tenderness  NERVOUS SYSTEM: answers questions and follows commands appropriately    LABS:                        9.5    10.26 )-----------( 189      ( 27 May 2022 09:22 )             29.2     CBC Full  -  ( 27 May 2022 09:22 )  WBC Count : 10.26 K/uL  Hemoglobin : 9.5 g/dL  Hematocrit : 29.2 %  Platelet Count - Automated : 189 K/uL  Mean Cell Volume : 93.0 fl  Mean Cell Hemoglobin : 30.3 pg  Mean Cell Hemoglobin Concentration : 32.5 gm/dL  Auto Neutrophil # : 7.70 K/uL  Auto Lymphocyte # : 1.41 K/uL  Auto Monocyte # : 1.05 K/uL  Auto Eosinophil # : 0.03 K/uL  Auto Basophil # : 0.02 K/uL  Auto Neutrophil % : 75.1 %  Auto Lymphocyte % : 13.7 %  Auto Monocyte % : 10.2 %  Auto Eosinophil % : 0.3 %  Auto Basophil % : 0.2 %    27 May 2022 09:22    134    |  99     |  75     ----------------------------<  126    5.1     |  25     |  3.50     Ca    7.4        27 May 2022 09:22        Urinalysis Basic - ( 26 May 2022 18:51 )    Color: Pale Yellow / Appearance: Clear / S.015 / pH: x  Gluc: x / Ketone: Negative  / Bili: Negative / Urobili: Negative   Blood: x / Protein: 15 / Nitrite: Negative   Leuk Esterase: Negative / RBC: 0-2 /HPF / WBC 0-2   Sq Epi: x / Non Sq Epi: Occasional / Bacteria: x      CAPILLARY BLOOD GLUCOSE              RADIOLOGY & ADDITIONAL TESTS:    Personally reviewed.     Consultant(s) Notes Reviewed:  [x] YES  [ ] NO

## 2022-05-27 NOTE — PROGRESS NOTE ADULT - SUBJECTIVE AND OBJECTIVE BOX
[facility] -- Donnell Downey, Sanket Santos, Tunde Gracia Savella, Goodger: Office # [phone]    Follow Up:  s/p Fall, Cardiac Optimization     Subjective/Observations: Patient seen and examined. Patient awake, alert, resting in bed. No complaints of chest pain, dyspnea, palpitations or dizziness. No signs of orthopnea or PND. Reports feeling tired. Tolerating O2 via nasal cannula. IVF @ 75    REVIEW OF SYSTEMS: All other review of systems are negative unless indicated above    PAST MEDICAL & SURGICAL HISTORY:  Hypothyroid      HTN (hypertension)      HF (heart failure)      Anxiety and depression      S/P hysterectomy      FH: cholecystectomy      MEDICATIONS  (STANDING):  amLODIPine   Tablet 5 milliGRAM(s) Oral daily  citalopram 40 milliGRAM(s) Oral daily  dextrose 5% + sodium chloride 0.45%. 1000 milliLiter(s) (75 mL/Hr) IV Continuous <Continuous>  heparin   Injectable 5000 Unit(s) SubCutaneous every 8 hours  levothyroxine 50 MICROGram(s) Oral daily  melatonin 3 milliGRAM(s) Oral at bedtime  metoprolol succinate ER 50 milliGRAM(s) Oral daily  pantoprazole    Tablet 40 milliGRAM(s) Oral before breakfast  polyethylene glycol 3350 17 Gram(s) Oral at bedtime  senna 2 Tablet(s) Oral at bedtime  sodium zirconium cyclosilicate 10 Gram(s) Oral two times a day    MEDICATIONS  (PRN):  benzocaine 15 mG/menthol 3.6 mG Lozenge 1 Lozenge Oral four times a day PRN Sore Throat  ondansetron    Tablet 4 milliGRAM(s) Oral three times a day PRN Nausea and/or Vomiting  traMADol 100 milliGRAM(s) Oral every 6 hours PRN Moderate Pain (4 - 6)  traMADol 50 milliGRAM(s) Oral every 6 hours PRN Mild Pain (1 - 3)    Allergies  sulfa drugs (Nausea)    Intolerances  codeine (Nausea)    Vital Signs Last 24 Hrs  T(C): 36.5 (27 May 2022 04:43), Max: 36.8 (26 May 2022 20:45)  T(F): 97.7 (27 May 2022 04:43), Max: 98.2 (26 May 2022 20:45)  HR: 64 (27 May 2022 08:24) (64 - 76)  BP: 109/53 (27 May 2022 08:24) (91/43 - 122/48)  BP(mean): --  RR: 16 (27 May 2022 04:43) (16 - 18)  SpO2: 96% (27 May 2022 04:43) (89% - 98%)  I&O's Summary    26 May 2022 07:01  -  27 May 2022 07:00  --------------------------------------------------------  IN: 1065 mL / OUT: 300 mL / NET: 765 mL    TELE: Not on telemetry   PHYSICAL EXAM:  Constitutional: NAD, awake and alert, cachectic   HEENT: Moist Mucous Membranes, Anicteric  Pulmonary: Non-labored, breath sounds are clear bilaterally, No wheezing, rales or rhonchi  Cardiovascular: Regular, S1 and S2, + murmurs, No rubs, gallops or clicks  Gastrointestinal:  soft, nontender, nondistended   Lymph: No peripheral edema. No lymphadenopathy.   Skin: No visible rashes or ulcers. Left hip dressing intact.   Psych:  Mood & affect appropriate      LABS: All Labs Reviewed:                        10.2   13.58 )-----------( 193      ( 26 May 2022 16:13 )             30.7                         8.7    15.12 )-----------( 198      ( 26 May 2022 06:26 )             27.2                         6.9    14.95 )-----------( 230      ( 25 May 2022 21:35 )             22.2     26 May 2022 16:13    137    |  103    |  64     ----------------------------<  122    5.8     |  27     |  3.10   26 May 2022 06:26    140    |  105    |  58     ----------------------------<  173    5.6     |  24     |  2.60   25 May 2022 21:35    142    |  107    |  48     ----------------------------<  83     4.8     |  25     |  2.00     Ca    7.8        26 May 2022 16:13  Ca    8.0        26 May 2022 06:26  Ca    8.2        25 May 2022 21:35  Phos  4.5       25 May 2022 08:53  Mg     2.5       25 May 2022 08:53    TPro  7.4    /  Alb  3.6    /  TBili  0.4    /  DBili  x      /  AST  24     /  ALT  21     /  AlkPhos  48     24 May 2022 21:33     PT/INR - ( 25 May 2022 08:53 )   PT: 12.3 sec;   INR: 1.05 ratio         PTT - ( 25 May 2022 08:53 )  PTT:31.5 secTroponin I, High Sensitivity Result: 23.5 ng/L (05-24-22 @ 21:33)    12 Lead ECG:   Ventricular Rate 68 BPM    Atrial Rate 68 BPM    P-R Interval 150 ms    QRS Duration 74 ms    Q-T Interval 456 ms    QTC Calculation(Bazett) 484 ms    P Axis 27 degrees    R Axis 21 degrees    T Axis 53 degrees    Diagnosis Line Normal sinus rhythm  Normal ECG  No previous ECGs available  Confirmed by Louis Coles MD ([id number]) on 5/25/2022 1:26:06 PM (05-24-22 @ 22:01)      ACC: [id number] EXAM:  ECHO TTE WO CON COMP W DOPP                          PROCEDURE DATE:  05/25/2022          INTERPRETATION:  INDICATION: Atrial fibrillation  Sonographer     Blood Pressure 109/48    Height 152 cm     Weight 30.8 kg       BSA 1.18   sq m    Dimensions:  LA 2.3       Normal Values: 2.0 - 4.0 cm  Ao 2.9        Normal Values: 2.0 - 3.8 cm  SEPTUM 1.2       Normal Values: 0.6 - 1.2 cm  PWT 0.8       Normal Values: 0.6 - 1.1 cm  LVIDd 3.7         Normal Values: 3.0 - 5.6 cm  LVIDs 2.9         Normal Values: 1.8 - 4.0 cm      OBSERVATIONS:  Mitral Valve: Mitral annular calcification with calcified leaflets. Mean   transmitral valve gradient equals 4 mmHg at a heart rate of 63 beats from   minute. This is consistent with mild mitral stenosis.  Mild to moderate MR  Aortic Valve/Aorta: Calcified trileaflet aortic valve with decreased   opening. Peak transaortic valve gradient is 23 mmHg with a mean   transaortic valve gradient 13.9 mmHg. The aortic valve area is calculated   to be 1.2 sq cm by continuity equation. This is consistent with moderate   aortic stenosis.  Moderate AI  Tricuspid Valve: normal with trace TR.  Pulmonic Valve: Mild PI  Left Atrium: normal  Right Atrium: Not well-visualized  Left Ventricle: normal LV size and systolic function, estimated LVEF of   60%.  Right Ventricle: Grossly normal size and systolic function.  Pericardium: no significant pericardial effusion.    IMPRESSION:  Normal left ventricular internal dimensions and systolic function,   estimated LVEF of 60%.  Grossly normal RV size and systolic function.  Calcified trileaflet aortic valve with moderate aortic stenosis, moderate   AI.  Mild mitral stenosis with mild to moderate mitral regurgitation  No significant pericardial effusion.  --- End of Report ---      ASHLEY COUCH MD; Attending Cardiologist  This document has been electronically signed. May 25 2022  1:26PM

## 2022-05-27 NOTE — DISCHARGE NOTE NURSING/CASE MANAGEMENT/SOCIAL WORK - NSDCPEFALRISK_GEN_ALL_CORE
For information on Fall & Injury Prevention, visit: [email or web address] OR  [email or web address] OR  https://www.cdc.gov/steadi/patient.html

## 2022-05-27 NOTE — PROGRESS NOTE ADULT - SUBJECTIVE AND OBJECTIVE BOX
Patient seen and examined at bedside. Pain is controlled. Reports feeling generally weak and tired.     Vital Signs Last 24 Hrs  T(C): 36.5 (27 May 2022 04:43), Max: 36.8 (26 May 2022 20:45)  T(F): 97.7 (27 May 2022 04:43), Max: 98.2 (26 May 2022 20:45)  HR: 76 (27 May 2022 04:43) (67 - 76)  BP: 122/48 (27 May 2022 04:43) (91/43 - 122/48)  BP(mean): --  RR: 16 (27 May 2022 04:43) (16 - 18)  SpO2: 96% (27 May 2022 04:43) (89% - 98%)         Exam:  Gen: NAD, resting comfortably  LLE  Compressive Dressing c/d/i  +EHL/FHL/TA/GS  SILT deep per/sup per/saph/sural   +DP  Calf NTTP b/l  Compartments soft and compressible    A/P:  89yFemale Stable POD2 L IMN    -Given 2units prbc postop hgb 10.2 will fu am labs  -FU labs  -WBAT  -Pain control  -PT/OT  -DVT PE ppx- heparin and SCDs  -Incentive spirometry  -dispo REKHA  -Stable for discharge from orthopedics standpoint with fu in office, call for appointment

## 2022-05-27 NOTE — DISCHARGE NOTE NURSING/CASE MANAGEMENT/SOCIAL WORK - PATIENT PORTAL LINK FT
You can access the FollowMyHealth Patient Portal offered by [facility] by registering at the following website: [email or web address]. By joining [facility]’s FollowMyHealth portal, you will also be able to view your health information using other applications (apps) compatible with our system.

## 2022-05-27 NOTE — DISCHARGE NOTE NURSING/CASE MANAGEMENT/SOCIAL WORK - NSDCVIVACCINE_GEN_ALL_CORE_FT
Tdap; 31-Dec-2014 16:53; Lynn Sheldon (JASON); [employer]; [id number]; IntraMuscular; Deltoid Left.; 0.5 milliLiter(s);

## 2022-05-27 NOTE — PROGRESS NOTE ADULT - PROBLEM SELECTOR PLAN 3
Likely reactive due to fall. RESOLVED   - No signs or symptoms of active infectious process   - Trend CBC with diff

## 2022-05-27 NOTE — PROGRESS NOTE ADULT - ASSESSMENT
89 y/o female w/ PMHx of HTN, HF (unknown EF), hypertension, depression and anxiety presents after having a fall yesterday, cardio consult requested for cardiac clearance for surgery.    s/p Fall, Cardiac Optimization, HTN, HF  - Fall appeared to be mechanical in nature.    - Now s/p Left hip IMN.  Tolerated well with no obvious cardiac complications  - Pain control    - ECHO showed normal LV & RV size and function EF 50%, mod AS, mod AI, mild MS, mild to mod MR  - No evidence of any meaningful volume overload     - No evidence of cardiac arrhythmias, now off tele.   - No clear evidence of acute ischemia     - BP well controlled  - Continue home Amlodipine and Toprol XL    - Continue to hold home Lasix; renal function worsening.  Okay with gentle hydration.      - Monitor and replete lytes, keep K>4, Mg>2.  - Will continue to follow.    Jenn Barreto MS FNP, [facility]  Nurse Practitioner- Cardiology   Spectra #[phone]

## 2022-05-28 LAB
ALBUMIN SERPL ELPH-MCNC: 2.5 G/DL — LOW (ref 3.3–5)
ALP SERPL-CCNC: 70 U/L — SIGNIFICANT CHANGE UP (ref 40–120)
ALT FLD-CCNC: 7 U/L — LOW (ref 12–78)
ANION GAP SERPL CALC-SCNC: 10 MMOL/L — SIGNIFICANT CHANGE UP (ref 5–17)
AST SERPL-CCNC: 26 U/L — SIGNIFICANT CHANGE UP (ref 15–37)
BILIRUB SERPL-MCNC: 0.3 MG/DL — SIGNIFICANT CHANGE UP (ref 0.2–1.2)
BUN SERPL-MCNC: 66 MG/DL — HIGH (ref 7–23)
CALCIUM SERPL-MCNC: 7.3 MG/DL — LOW (ref 8.5–10.1)
CHLORIDE SERPL-SCNC: 101 MMOL/L — SIGNIFICANT CHANGE UP (ref 96–108)
CK SERPL-CCNC: 75 U/L — SIGNIFICANT CHANGE UP (ref 26–192)
CO2 SERPL-SCNC: 24 MMOL/L — SIGNIFICANT CHANGE UP (ref 22–31)
CREAT ?TM UR-MCNC: 81 MG/DL — SIGNIFICANT CHANGE UP
CREAT SERPL-MCNC: 3.1 MG/DL — HIGH (ref 0.5–1.3)
EGFR: 13 ML/MIN/1.73M2 — LOW
EOSINOPHIL NFR URNS MANUAL: NEGATIVE — SIGNIFICANT CHANGE UP
GLUCOSE SERPL-MCNC: 100 MG/DL — HIGH (ref 70–99)
HCT VFR BLD CALC: 29.2 % — LOW (ref 34.5–45)
HGB BLD-MCNC: 9.3 G/DL — LOW (ref 11.5–15.5)
MAGNESIUM SERPL-MCNC: 2.3 MG/DL — SIGNIFICANT CHANGE UP (ref 1.6–2.6)
MCHC RBC-ENTMCNC: 29.8 PG — SIGNIFICANT CHANGE UP (ref 27–34)
MCHC RBC-ENTMCNC: 31.8 GM/DL — LOW (ref 32–36)
MCV RBC AUTO: 93.6 FL — SIGNIFICANT CHANGE UP (ref 80–100)
NRBC # BLD: 0 /100 WBCS — SIGNIFICANT CHANGE UP (ref 0–0)
OSMOLALITY UR: 348 MOSM/KG — SIGNIFICANT CHANGE UP (ref 50–1200)
PHOSPHATE SERPL-MCNC: 4.5 MG/DL — SIGNIFICANT CHANGE UP (ref 2.5–4.5)
PLATELET # BLD AUTO: 175 K/UL — SIGNIFICANT CHANGE UP (ref 150–400)
POTASSIUM SERPL-MCNC: 4.8 MMOL/L — SIGNIFICANT CHANGE UP (ref 3.5–5.3)
POTASSIUM SERPL-SCNC: 4.8 MMOL/L — SIGNIFICANT CHANGE UP (ref 3.5–5.3)
POTASSIUM UR-SCNC: 54 MMOL/L — SIGNIFICANT CHANGE UP
PROT ?TM UR-MCNC: 45 MG/DL — HIGH (ref 0–12)
PROT SERPL-MCNC: 6 G/DL — SIGNIFICANT CHANGE UP (ref 6–8.3)
RBC # BLD: 3.12 M/UL — LOW (ref 3.8–5.2)
RBC # FLD: 15.1 % — HIGH (ref 10.3–14.5)
SODIUM SERPL-SCNC: 135 MMOL/L — SIGNIFICANT CHANGE UP (ref 135–145)
SODIUM UR-SCNC: 33 MMOL/L — SIGNIFICANT CHANGE UP
T3FREE SERPL-MCNC: 1.12 PG/ML — LOW (ref 1.8–4.6)
T4 FREE SERPL-MCNC: 1.4 NG/DL — SIGNIFICANT CHANGE UP (ref 0.9–1.8)
URATE SERPL-MCNC: 8.6 MG/DL — HIGH (ref 2.5–7)
WBC # BLD: 8.4 K/UL — SIGNIFICANT CHANGE UP (ref 3.8–10.5)
WBC # FLD AUTO: 8.4 K/UL — SIGNIFICANT CHANGE UP (ref 3.8–10.5)

## 2022-05-28 PROCEDURE — 99233 SBSQ HOSP IP/OBS HIGH 50: CPT | Mod: GC

## 2022-05-28 PROCEDURE — 99232 SBSQ HOSP IP/OBS MODERATE 35: CPT

## 2022-05-28 RX ORDER — LEVOTHYROXINE SODIUM 125 MCG
75 TABLET ORAL DAILY
Refills: 0 | Status: DISCONTINUED | OUTPATIENT
Start: 2022-05-29 | End: 2022-05-31

## 2022-05-28 RX ADMIN — AMLODIPINE BESYLATE 5 MILLIGRAM(S): 2.5 TABLET ORAL at 05:26

## 2022-05-28 RX ADMIN — CITALOPRAM 40 MILLIGRAM(S): 10 TABLET, FILM COATED ORAL at 13:01

## 2022-05-28 RX ADMIN — Medication 50 MICROGRAM(S): at 05:26

## 2022-05-28 RX ADMIN — HEPARIN SODIUM 5000 UNIT(S): 5000 INJECTION INTRAVENOUS; SUBCUTANEOUS at 13:01

## 2022-05-28 RX ADMIN — POLYETHYLENE GLYCOL 3350 17 GRAM(S): 17 POWDER, FOR SOLUTION ORAL at 21:47

## 2022-05-28 RX ADMIN — Medication 3 MILLIGRAM(S): at 21:47

## 2022-05-28 RX ADMIN — HEPARIN SODIUM 5000 UNIT(S): 5000 INJECTION INTRAVENOUS; SUBCUTANEOUS at 05:26

## 2022-05-28 RX ADMIN — HEPARIN SODIUM 5000 UNIT(S): 5000 INJECTION INTRAVENOUS; SUBCUTANEOUS at 21:47

## 2022-05-28 RX ADMIN — Medication 50 MILLIGRAM(S): at 05:26

## 2022-05-28 RX ADMIN — PANTOPRAZOLE SODIUM 40 MILLIGRAM(S): 20 TABLET, DELAYED RELEASE ORAL at 05:27

## 2022-05-28 RX ADMIN — SENNA PLUS 2 TABLET(S): 8.6 TABLET ORAL at 21:48

## 2022-05-28 NOTE — PROGRESS NOTE ADULT - SUBJECTIVE AND OBJECTIVE BOX
Patient is a 89y old  Female who presents with a chief complaint of Hip fracture (27 May 2022 11:12)       HPI:  89 y/o female w/ PMHx of HTN, HF (unknown EF), hypertension, depression and anxiety presents after having a fall earlier today. Patient chocked on something, was coughing,  went to bathroom and suddenly fell. She hit head in the frontal side, L elbow and hip. Had an episode of urinary incontinence after fall. Patient's son helped to get out from the floor, denies LOC. Patient denies presyncopal symptoms. Patient has multiple falls in the last couple of weeks due to unstable gait, however patient refuse to use walker per son, last fall was 3 days ago. Patent endorses poor appetite has 2 meals per day. Has constipation, not able to remember when was the last BM. She has chronic SIDHU.   At this time she denies headaches, dizziness, nausea, vomiting, chest pain, abdominal pain, fever, hematuria, melena, hematochezia or other symptoms.   Patient has been living with her son for the last 3 months.     Renal consulted for GIL/CKD. S/p OR. +Rubin. Poor appetite. H/o of CKD per family        States she feels better, No SOB/N/V    PAST MEDICAL & SURGICAL HISTORY:  Hypothyroid      HTN (hypertension)      HF (heart failure)      Anxiety and depression      S/P hysterectomy      FH: cholecystectomy           FAMILY HISTORY:  FH: heart disease (Father)    NC    Social History:Non smoker    MEDICATIONS  (STANDING):  amLODIPine   Tablet 5 milliGRAM(s) Oral daily  citalopram 40 milliGRAM(s) Oral daily  dextrose 5% + sodium chloride 0.45%. 1000 milliLiter(s) (75 mL/Hr) IV Continuous <Continuous>  heparin   Injectable 5000 Unit(s) SubCutaneous every 8 hours  levothyroxine 50 MICROGram(s) Oral daily  melatonin 3 milliGRAM(s) Oral at bedtime  metoprolol succinate ER 50 milliGRAM(s) Oral daily  pantoprazole    Tablet 40 milliGRAM(s) Oral before breakfast  polyethylene glycol 3350 17 Gram(s) Oral at bedtime  senna 2 Tablet(s) Oral at bedtime    MEDICATIONS  (PRN):  benzocaine 15 mG/menthol 3.6 mG Lozenge 1 Lozenge Oral four times a day PRN Sore Throat  ondansetron    Tablet 4 milliGRAM(s) Oral three times a day PRN Nausea and/or Vomiting  traMADol 100 milliGRAM(s) Oral every 6 hours PRN Moderate Pain (4 - 6)  traMADol 50 milliGRAM(s) Oral every 6 hours PRN Mild Pain (1 - 3)   Meds reviewed    Allergies    sulfa drugs (Nausea)    Intolerances    codeine (Nausea)       REVIEW OF SYSTEMS: As per HPI, otherwise negative     ICU Vital Signs Last 24 Hrs  T(C): 36.6 (28 May 2022 04:43), Max: 36.6 (27 May 2022 13:17)  T(F): 97.9 (28 May 2022 04:43), Max: 97.9 (27 May 2022 13:17)  HR: 63 (28 May 2022 04:43) (60 - 63)  BP: 136/57 (28 May 2022 04:43) (109/50 - 136/57)  BP(mean): --  ABP: --  ABP(mean): --  RR: 18 (28 May 2022 04:43) (17 - 18)  SpO2: 92% (28 May 2022 04:43) (92% - 95%)      PHYSICAL EXAM:  GENERAL: NAD  HEENT:  anicteric, moist mucous membranes  CHEST/LUNG:  CTA b/l, no rales, wheezes, or rhonchi  HEART:  RRR, S1, S2  ABDOMEN:  BS+, soft, nontender, nondistended  EXTREMITIES:  no edema, moves UE spont  NERVOUS SYSTEM: answers questions and follows commands appropriately      LABS:                                        9.3    8.40  )-----------( 175      ( 28 May 2022 08:35 )             29.2         135  |  101  |  66<H>  ----------------------------<  100<H>  4.8   |  24  |  3.10<H>    Ca    7.3<L>      28 May 2022 08:35  Phos  4.5       Mg     2.3         TPro  6.0  /  Alb  2.5<L>  /  TBili  0.3  /  DBili  x   /  AST  26  /  ALT  7<L>  /  AlkPhos  70        Urinalysis Basic - ( 26 May 2022 18:51 )    Color: Pale Yellow / Appearance: Clear / S.015 / pH: x  Gluc: x / Ketone: Negative  / Bili: Negative / Urobili: Negative   Blood: x / Protein: 15 / Nitrite: Negative   Leuk Esterase: Negative / RBC: 0-2 /HPF / WBC 0-2   Sq Epi: x / Non Sq Epi: Occasional / Bacteria: x

## 2022-05-28 NOTE — PROGRESS NOTE ADULT - PROBLEM SELECTOR PLAN 4
likely 2/2 to acute loss post op on chronic iron deficiency anemia  - H/H 10.1/32.1 on admission, baseline hemoglobin unknown  - Currently hemodynamically stable, monitor for signs and symptoms of active bleeding  - Blood transfusion consent signed and placed in chart  - Maintain active type and screen  - s/p 2u PRBC perioperatively for acute blood loss anemia - will transfuse <8  - iron studies noted  - F/u AM CBC

## 2022-05-28 NOTE — PROGRESS NOTE ADULT - ASSESSMENT
89 y/o female w/ PMHx of HTN, HF (unknown EF), hypertension, depression and anxiety presents after having a fall yesterday, cardio consult requested for cardiac clearance for surgery.    s/p Fall, Cardiac Optimization, HTN, HF  - Fall appeared to be mechanical in nature.    - Now s/p Left hip IMN.  Tolerated well with no obvious cardiac complications  - Pain control    - ECHO showed normal LV & RV size and function EF 50%, mod AS, mod AI, mild MS, mild to mod MR  - No evidence of any meaningful volume overload     - No evidence of cardiac arrhythmias, now off tele.   - No clear evidence of acute ischemia     - BP well controlled  - Continue home Amlodipine and Toprol XL    - Continue to hold home Lasix; renal function worsening.  Okay with gentle hydration.      - Monitor and replete lytes, keep K>4, Mg>2.  - Will continue to follow.

## 2022-05-28 NOTE — PROGRESS NOTE ADULT - SUBJECTIVE AND OBJECTIVE BOX
· Subjective and Objective:   [facility] CARDIOLOGY CONSULTANTS:    Donnell Downey, Tom, Sanket, Tunde Gracia, Lali Will      [phone]    CHIEF COMPLAINT: Patient is a 89y old  Female who presents with a chief complaint of Hip fracture (28 May 2022 07:35)      Follow Up:  s/p Fall, Cardiac Optimization     Subjective/Observations: Patient seen and examined. Patient awake, alert, resting in bed. No complaints of chest pain, dyspnea, palpitations or dizziness. No signs of orthopnea or PND.     REVIEW OF SYSTEMS: All other review of systems are negative unless indicated above        PAST MEDICAL & SURGICAL HISTORY:  Hypothyroid      HTN (hypertension)      HF (heart failure)      Anxiety and depression      S/P hysterectomy      FH: cholecystectomy          MEDICATIONS  (STANDING):  amLODIPine   Tablet 5 milliGRAM(s) Oral daily  citalopram 40 milliGRAM(s) Oral daily  dextrose 5% + sodium chloride 0.45%. 1000 milliLiter(s) (75 mL/Hr) IV Continuous <Continuous>  heparin   Injectable 5000 Unit(s) SubCutaneous every 8 hours  levothyroxine 50 MICROGram(s) Oral daily  melatonin 3 milliGRAM(s) Oral at bedtime  metoprolol succinate ER 50 milliGRAM(s) Oral daily  pantoprazole    Tablet 40 milliGRAM(s) Oral before breakfast  polyethylene glycol 3350 17 Gram(s) Oral at bedtime  senna 2 Tablet(s) Oral at bedtime      Allergies    sulfa drugs (Nausea)    Intolerances    codeine (Nausea)                            9.5    10.26 )-----------( 189      ( 27 May 2022 09:22 )             29.2       05-27    134<L>  |  99  |  75<H>  ----------------------------<  126<H>  5.1   |  25  |  3.50<H>    Ca    7.4<L>      27 May 2022 09:22                                  Daily     Daily     I&O's Summary    27 May 2022 07:01  -  28 May 2022 07:00  --------------------------------------------------------  IN: 900 mL / OUT: 600 mL / NET: 300 mL        Vital Signs Last 24 Hrs  T(C): 36.6 (28 May 2022 04:43), Max: 36.6 (27 May 2022 13:17)  T(F): 97.9 (28 May 2022 04:43), Max: 97.9 (27 May 2022 13:17)  HR: 63 (28 May 2022 04:43) (60 - 64)  BP: 136/57 (28 May 2022 04:43) (109/50 - 136/57)  BP(mean): --  RR: 18 (28 May 2022 04:43) (17 - 18)  SpO2: 92% (28 May 2022 04:43) (92% - 95%)    PHYSICAL EXAM:   · Constitutional	Well-developed, well nourished  · Eyes	EOMI; PERRL; no drainage or redness  · ENMT	No oral lesions; no gross abnormalities  · Neck	No bruits; no thyromegaly or nodules  · Respiratory	Normal breath sounds b/l, No RRW  · Cardiovascular	Regular rate & rhythm, normal S1, S2; no murmurs, gallops or rubs; no S3, S4  · Gastrointestinal	Soft, non-tender, no hepatosplenomegaly, normal bowel sounds  · Extremities	No cyanosis, clubbing or edema  · Vascular	Equal and normal pulses (carotid, femoral, dorsalis pedis)  · Neurological	Alert & oriented; no sensory, motor or coordination deficits, normal reflexes

## 2022-05-28 NOTE — PROGRESS NOTE ADULT - SUBJECTIVE AND OBJECTIVE BOX
Patient seen and examined at bedside. Pain is controlled. Reports feeling generally weak and tired.     Vital Signs Last 24 Hrs  T(C): 36.6 (28 May 2022 04:43), Max: 36.6 (27 May 2022 13:17)  T(F): 97.9 (28 May 2022 04:43), Max: 97.9 (27 May 2022 13:17)  HR: 63 (28 May 2022 04:43) (60 - 64)  BP: 136/57 (28 May 2022 04:43) (109/50 - 136/57)  RR: 18 (28 May 2022 04:43) (17 - 18)  SpO2: 92% (28 May 2022 04:43) (92% - 95%)    Exam:  Gen: NAD, resting comfortably  LLE  Compressive Dressing c/d/i  +EHL/FHL/TA/GS  SILT deep per/sup per/saph/sural   +DP  Calf NTTP b/l  Compartments soft and compressible    A/P:  89yFemale Stable POD3 L IMN    -Given 2units prbc postop hgb 9.5  -FU labs  -WBAT  -Pain control  -PT/OT  -DVT PE ppx- heparin and SCDs  -Incentive spirometry  -dispo REKHA  -Stable for discharge from orthopedics standpoint with fu in office, call for appointment

## 2022-05-28 NOTE — PROGRESS NOTE ADULT - SUBJECTIVE AND OBJECTIVE BOX
***CHARTING IN PROGRESS*** Patient is a 89y old  Female who presents with a chief complaint of Hip fracture (28 May 2022 09:50)      FROM ADMISSION H+P:   HPI:  89 y/o female w/ PMHx of HTN, HF (unknown EF), hypertension, depression and anxiety presents after having a fall earlier today. Patient chocked on something, was coughing,  went to bathroom and suddenly fell. She hit head in the frontal side, L elbow and hip. Had an episode of urinary incontinence after fall. Patient's son helped to get out from the floor, denies LOC. Patient denies presyncopal symptoms. Patient has multiple falls in the last couple of weeks due to unstable gait, however patient refuse to use walker per son, last fall was 3 days ago. Patent endorses poor appetite has 2 meals per day. Has constipation, not able to remember when was the last BM. She has chronic SIDHU.   At this time she denies headaches, dizziness, nausea, vomiting, chest pain, abdominal pain, fever, hematuria, melena, hematochezia or other symptoms.   Patient has been living with her son for the last 3 months.       In ED:   Vitals: 97.6F, HR 66, 134/43, RR 15, 98% on RA   Labs significant for: WBC 10.97, hgb 10.1, Cr 1.9 (baseline unknown), BUN 44, BNP 2743    CT head and c spine: scalp hematoma w/o intracranial hemorrhage, no c spine fracture  CT hip: Comminuted, displaced fracture of the greater trochanter of the femur. There appears to be extension of the fracture into the intertrochanteric region of the left femur.  EKG: NSR @ 68 bpm   Received Ofirmev 1000mg x1, ofirmev 500mg x1 and Ortho consulted in the ED     (24 May 2022 22:21)      ----  INTERVAL HPI/OVERNIGHT EVENTS: Pt seen and evaluated at the bedside. No acute overnight events occurred. Patient denies any pain at this time. States she feeling well. Denies any decreased sensation in LLE. Denies fever, chills, headache, dizziness, chest pain, shortness of breath, cough, nausea, vomiting, abdominal pain, diarrhea or constipation.      ----  PAST MEDICAL & SURGICAL HISTORY:  Hypothyroid      HTN (hypertension)      HF (heart failure)      Anxiety and depression      S/P hysterectomy      FH: cholecystectomy          FAMILY HISTORY:  FH: heart disease (Father)        Home Medications:  amLODIPine 5 mg oral tablet: 1 tab(s) orally once a day (24 May 2022 23:25)  citalopram 40 mg oral tablet: 1 tab(s) orally once a day (24 May 2022 23:25)  Lasix 20 mg oral tablet: 1 tab(s) orally once a day (24 May 2022 23:25)  levothyroxine 50 mcg (0.05 mg) oral tablet: 1 tab(s) orally once a day (24 May 2022 23:25)  metoprolol succinate 50 mg oral tablet, extended release: 1 tab(s) orally once a day (24 May 2022 23:25)  omeprazole 20 mg oral delayed release capsule: 1 cap(s) orally once a day (24 May 2022 23:25)  potassium chloride 8 mEq (600 mg) oral capsule, extended release: 1 cap(s) orally once a day (24 May 2022 23:25)      Allergies    sulfa drugs (Nausea)    Intolerances    codeine (Nausea)      ----  MEDICATIONS  (STANDING):  amLODIPine   Tablet 5 milliGRAM(s) Oral daily  citalopram 40 milliGRAM(s) Oral daily  dextrose 5% + sodium chloride 0.45%. 1000 milliLiter(s) (75 mL/Hr) IV Continuous <Continuous>  heparin   Injectable 5000 Unit(s) SubCutaneous every 8 hours  levothyroxine 50 MICROGram(s) Oral daily  melatonin 3 milliGRAM(s) Oral at bedtime  metoprolol succinate ER 50 milliGRAM(s) Oral daily  pantoprazole    Tablet 40 milliGRAM(s) Oral before breakfast  polyethylene glycol 3350 17 Gram(s) Oral at bedtime  senna 2 Tablet(s) Oral at bedtime    MEDICATIONS  (PRN):  benzocaine 15 mG/menthol 3.6 mG Lozenge 1 Lozenge Oral four times a day PRN Sore Throat  ondansetron    Tablet 4 milliGRAM(s) Oral three times a day PRN Nausea and/or Vomiting  traMADol 100 milliGRAM(s) Oral every 6 hours PRN Moderate Pain (4 - 6)  traMADol 50 milliGRAM(s) Oral every 6 hours PRN Mild Pain (1 - 3)      ----  REVIEW OF SYSTEMS:  Constitutional: denies fever, chills  HEENT: denies headache, dizziness, or lightheadedness  Respiratory: denies SOB, cough, or wheezing  Cardiovascular: denies CP, palpitations  Gastrointestinal: denies nausea, vomiting, diarrhea, constipation, abdominal pain, or bloody stools  Genitourinary: denies painful urination, increased frequency, urgency, or bloody urine  Skin/Breast: denies rashes or itching  Musculoskeletal: denies muscle aches, joint swelling, or muscle weakness  Neurologic: denies loss of sensation, numbness, or tingling    ----  PHYSICAL EXAM:  Gen: well appearing, NAD  HEENT: NCAT, PEERLA b/l, EOMI b/l, no conjunctival erythema  Cardio: regular rate and rhythm, +s1s2, no murmurs, rubs, or gallops  Pulm: CTA b/l, no wheezes, rales or rhonchi  Abdomen: soft, nontender, nondistended, +BS x4 quadrants, no guarding  Extremities: no clubbing, cyanosis or edema, +2 pedal pulses, L hip dressings clean, dry and intact  Neuro: AAOx3, sensation intact  Skin: warm and dry      T(C): 36.6 (22 @ 04:43), Max: 36.6 (22 @ 13:17)  HR: 63 (22 @ 04:43) (60 - 63)  BP: 136/57 (22 @ 04:43) (109/50 - 136/57)  RR: 18 (22 @ 04:43) (17 - 18)  SpO2: 92% (22 @ 04:43) (92% - 95%)  Wt(kg): --    ----  I&O's Summary    27 May 2022 07:01  -  28 May 2022 07:00  --------------------------------------------------------  IN: 900 mL / OUT: 600 mL / NET: 300 mL        LABS:                        9.3    8.40  )-----------( 175      ( 28 May 2022 08:35 )             29.2         135  |  101  |  66<H>  ----------------------------<  100<H>  4.8   |  24  |  3.10<H>    Ca    7.3<L>      28 May 2022 08:35  Phos  4.5       Mg     2.3         TPro  6.0  /  Alb  2.5<L>  /  TBili  0.3  /  DBili  x   /  AST  26  /  ALT  7<L>  /  AlkPhos  70        Urinalysis Basic - ( 26 May 2022 18:51 )    Color: Pale Yellow / Appearance: Clear / S.015 / pH: x  Gluc: x / Ketone: Negative  / Bili: Negative / Urobili: Negative   Blood: x / Protein: 15 / Nitrite: Negative   Leuk Esterase: Negative / RBC: 0-2 /HPF / WBC 0-2   Sq Epi: x / Non Sq Epi: Occasional / Bacteria: x      CAPILLARY BLOOD GLUCOSE                    ----

## 2022-05-28 NOTE — PROGRESS NOTE ADULT - ASSESSMENT
GIL/CKD  Hip fracture s/p IMN  HTN  Anemia  Cardiomyopathy     -CKDIII-IV with baseline Cr around 2  -GIL with post-op ATN  -Keep Rubin  -Urine studies  -Hold diuretics  -Cont IVF additional 24 hours  -ATN will have to run its course. No indication for RRT at this time. Creatinine improving  -Avoid nephrotoxins. Keep MAP>65  -If creatinine continues to improve DC IVF and Plan for TOV      Thank you for involving nephrology for the care of this patient

## 2022-05-28 NOTE — PROGRESS NOTE ADULT - ASSESSMENT
89 y/o female w/ PMHx of HTN, HF (unknown EF), hypotension, depression and anxiety presents after having a fall earlier today. Patient admitted for L hip fracture, s/p L hip IMN on 5/25 with Dr. Winston, course c/b perioperative acute blood loss anemia, s/p 2u PRBC, on heparin subq q8hrs, await normalization of renal indices

## 2022-05-28 NOTE — PROGRESS NOTE ADULT - PROBLEM SELECTOR PLAN 3
GIL (on CKD) likely 2/2 pre-renal azotemia in the setting of dehydration,  poor PO intake  - Creatinine 1.9 on admission   - Baseline creatinine unknown  - Worsened Cr this AM, likely multifactorial 2/2 hypotension and anemia  - Monitor BMP  - Avoid nephrotoxic medications  - Monitor renal indices GIL (on CKD) likely 2/2 pre-renal azotemia in the setting of dehydration,  poor PO intake  - Creatinine 1.9 on admission - then worsening post op likely multifactorial 2/2 hypotension and anemia  -Cr. improving this AM 3.50 -> 3.10  - Monitor BMP  - Avoid nephrotoxic medications  - Monitor renal indices

## 2022-05-29 LAB
ANION GAP SERPL CALC-SCNC: 10 MMOL/L — SIGNIFICANT CHANGE UP (ref 5–17)
BASOPHILS # BLD AUTO: 0.02 K/UL — SIGNIFICANT CHANGE UP (ref 0–0.2)
BASOPHILS NFR BLD AUTO: 0.3 % — SIGNIFICANT CHANGE UP (ref 0–2)
BUN SERPL-MCNC: 58 MG/DL — HIGH (ref 7–23)
CALCIUM SERPL-MCNC: 7.4 MG/DL — LOW (ref 8.5–10.1)
CHLORIDE SERPL-SCNC: 102 MMOL/L — SIGNIFICANT CHANGE UP (ref 96–108)
CO2 SERPL-SCNC: 24 MMOL/L — SIGNIFICANT CHANGE UP (ref 22–31)
CREAT SERPL-MCNC: 2.4 MG/DL — HIGH (ref 0.5–1.3)
EGFR: 18 ML/MIN/1.73M2 — LOW
EOSINOPHIL # BLD AUTO: 0.05 K/UL — SIGNIFICANT CHANGE UP (ref 0–0.5)
EOSINOPHIL NFR BLD AUTO: 0.7 % — SIGNIFICANT CHANGE UP (ref 0–6)
GLUCOSE SERPL-MCNC: 124 MG/DL — HIGH (ref 70–99)
HCT VFR BLD CALC: 25.8 % — LOW (ref 34.5–45)
HCT VFR BLD CALC: 30 % — LOW (ref 34.5–45)
HGB BLD-MCNC: 10 G/DL — LOW (ref 11.5–15.5)
HGB BLD-MCNC: 8.3 G/DL — LOW (ref 11.5–15.5)
IMM GRANULOCYTES NFR BLD AUTO: 0.8 % — SIGNIFICANT CHANGE UP (ref 0–1.5)
LYMPHOCYTES # BLD AUTO: 1.15 K/UL — SIGNIFICANT CHANGE UP (ref 1–3.3)
LYMPHOCYTES # BLD AUTO: 15.2 % — SIGNIFICANT CHANGE UP (ref 13–44)
MCHC RBC-ENTMCNC: 29.6 PG — SIGNIFICANT CHANGE UP (ref 27–34)
MCHC RBC-ENTMCNC: 32.2 GM/DL — SIGNIFICANT CHANGE UP (ref 32–36)
MCV RBC AUTO: 92.1 FL — SIGNIFICANT CHANGE UP (ref 80–100)
MONOCYTES # BLD AUTO: 0.84 K/UL — SIGNIFICANT CHANGE UP (ref 0–0.9)
MONOCYTES NFR BLD AUTO: 11.1 % — SIGNIFICANT CHANGE UP (ref 2–14)
NEUTROPHILS # BLD AUTO: 5.47 K/UL — SIGNIFICANT CHANGE UP (ref 1.8–7.4)
NEUTROPHILS NFR BLD AUTO: 71.9 % — SIGNIFICANT CHANGE UP (ref 43–77)
NRBC # BLD: 0 /100 WBCS — SIGNIFICANT CHANGE UP (ref 0–0)
OB PNL STL: NEGATIVE — SIGNIFICANT CHANGE UP
PLATELET # BLD AUTO: 176 K/UL — SIGNIFICANT CHANGE UP (ref 150–400)
POTASSIUM SERPL-MCNC: 4 MMOL/L — SIGNIFICANT CHANGE UP (ref 3.5–5.3)
POTASSIUM SERPL-SCNC: 4 MMOL/L — SIGNIFICANT CHANGE UP (ref 3.5–5.3)
RBC # BLD: 2.8 M/UL — LOW (ref 3.8–5.2)
RBC # FLD: 14.4 % — SIGNIFICANT CHANGE UP (ref 10.3–14.5)
SODIUM SERPL-SCNC: 136 MMOL/L — SIGNIFICANT CHANGE UP (ref 135–145)
WBC # BLD: 7.59 K/UL — SIGNIFICANT CHANGE UP (ref 3.8–10.5)
WBC # FLD AUTO: 7.59 K/UL — SIGNIFICANT CHANGE UP (ref 3.8–10.5)

## 2022-05-29 PROCEDURE — 99232 SBSQ HOSP IP/OBS MODERATE 35: CPT

## 2022-05-29 PROCEDURE — 99233 SBSQ HOSP IP/OBS HIGH 50: CPT | Mod: GC

## 2022-05-29 RX ADMIN — SENNA PLUS 2 TABLET(S): 8.6 TABLET ORAL at 21:21

## 2022-05-29 RX ADMIN — Medication 75 MICROGRAM(S): at 05:40

## 2022-05-29 RX ADMIN — Medication 50 MILLIGRAM(S): at 05:39

## 2022-05-29 RX ADMIN — CITALOPRAM 40 MILLIGRAM(S): 10 TABLET, FILM COATED ORAL at 11:01

## 2022-05-29 RX ADMIN — Medication 3 MILLIGRAM(S): at 21:21

## 2022-05-29 RX ADMIN — AMLODIPINE BESYLATE 5 MILLIGRAM(S): 2.5 TABLET ORAL at 05:39

## 2022-05-29 RX ADMIN — PANTOPRAZOLE SODIUM 40 MILLIGRAM(S): 20 TABLET, DELAYED RELEASE ORAL at 05:40

## 2022-05-29 RX ADMIN — POLYETHYLENE GLYCOL 3350 17 GRAM(S): 17 POWDER, FOR SOLUTION ORAL at 21:22

## 2022-05-29 RX ADMIN — HEPARIN SODIUM 5000 UNIT(S): 5000 INJECTION INTRAVENOUS; SUBCUTANEOUS at 05:39

## 2022-05-29 NOTE — PROGRESS NOTE ADULT - ASSESSMENT
GIL/CKD  Hip fracture s/p IMN  HTN  Anemia  Cardiomyopathy     -CKDIII-IV with baseline Cr around 2  -GIL with post-op ATN  -Keep Rubin  -Urine studies  -Hold diuretics  -DC IVF  -ATN will have to run its course. No indication for RRT at this time. Creatinine improving  -Avoid nephrotoxins. Keep MAP>65  -Rubin removed      Thank you for involving nephrology for the care of this patient

## 2022-05-29 NOTE — PROGRESS NOTE ADULT - SUBJECTIVE AND OBJECTIVE BOX
Patient seen and examined at bedside. Pain is controlled. Reports feeling generally weak and tired.     Vital Signs Last 24 Hrs  T(C): 36.9 (29 May 2022 05:21), Max: 36.9 (28 May 2022 13:05)  T(F): 98.5 (29 May 2022 05:21), Max: 98.5 (29 May 2022 05:21)  HR: 64 (29 May 2022 05:21) (64 - 70)  BP: 128/47 (29 May 2022 05:21) (107/55 - 131/52)  RR: 18 (29 May 2022 05:21) (18 - 18)  SpO2: 95% (29 May 2022 05:21) (93% - 95%)      Exam:  Gen: NAD, resting comfortably  LLE  Compressive Dressing c/d/i  +EHL/FHL/TA/GS  SILT deep per/sup per/saph/sural   +DP  Calf NTTP b/l  Compartments soft and compressible    A/P:  89yFemale Stable POD4 L IMN    -Given 2units prbc postop hgb 9.5  -FU labs  -WBAT  -Pain control  -PT/OT  -DVT PE ppx- heparin and SCDs  -Incentive spirometry  -dispo REKHA  -Stable for discharge from orthopedics standpoint with fu in office, call for appointment

## 2022-05-29 NOTE — PROGRESS NOTE ADULT - PROBLEM SELECTOR PLAN 4
likely 2/2 to acute loss post op on chronic iron deficiency anemia  - H/H 10.1/32.1 on admission, baseline hemoglobin unknown  - Currently hemodynamically stable, monitor for signs and symptoms of active bleeding  - Blood transfusion consent signed and placed in chart  - Maintain active type and screen  - s/p 2u PRBC perioperatively for acute blood loss anemia - will transfuse <8  - iron studies noted  - F/u AM CBC likely 2/2 to acute loss post op on chronic iron deficiency anemia  - Blood transfusion consent signed and placed in chart  - Maintain active type and screen  - s/p 2u PRBC perioperatively for acute blood loss anemia   - this AM Hgb of 8.3 - will transfuse 1 unit prbc  - iron studies noted  - F/u AM CBC

## 2022-05-29 NOTE — PROGRESS NOTE ADULT - SUBJECTIVE AND OBJECTIVE BOX
[facility] -- Donnell Downey, Sanket Santos, Tunde Gracia Savella, [place]  Office # [phone]    Follow Up:  s/p Fall, Cardiac Optimization     Subjective/Observations: Patient seen and examined. Patient awake, alert, resting in bed. No complaints of chest pain, dyspnea, palpitations or dizziness. No signs of orthopnea or PND.   Tolerating RA, on IVF    REVIEW OF SYSTEMS: All other review of systems is negative unless indicated above  PAST MEDICAL & SURGICAL HISTORY:  Hypothyroid      HTN (hypertension)      HF (heart failure)      Anxiety and depression      S/P hysterectomy      FH: cholecystectomy        MEDICATIONS  (STANDING):  amLODIPine   Tablet 5 milliGRAM(s) Oral daily  citalopram 40 milliGRAM(s) Oral daily  dextrose 5% + sodium chloride 0.45%. 1000 milliLiter(s) (75 mL/Hr) IV Continuous <Continuous>  heparin   Injectable 5000 Unit(s) SubCutaneous every 8 hours  levothyroxine 75 MICROGram(s) Oral daily  melatonin 3 milliGRAM(s) Oral at bedtime  metoprolol succinate ER 50 milliGRAM(s) Oral daily  pantoprazole    Tablet 40 milliGRAM(s) Oral before breakfast  polyethylene glycol 3350 17 Gram(s) Oral at bedtime  senna 2 Tablet(s) Oral at bedtime    MEDICATIONS  (PRN):  benzocaine 15 mG/menthol 3.6 mG Lozenge 1 Lozenge Oral four times a day PRN Sore Throat  ondansetron    Tablet 4 milliGRAM(s) Oral three times a day PRN Nausea and/or Vomiting  traMADol 100 milliGRAM(s) Oral every 6 hours PRN Moderate Pain (4 - 6)  traMADol 50 milliGRAM(s) Oral every 6 hours PRN Mild Pain (1 - 3)    Allergies    sulfa drugs (Nausea)    Intolerances    codeine (Nausea)    Vital Signs Last 24 Hrs  T(C): 36.9 (29 May 2022 05:21), Max: 36.9 (28 May 2022 13:05)  T(F): 98.5 (29 May 2022 05:21), Max: 98.5 (29 May 2022 05:21)  HR: 64 (29 May 2022 05:21) (64 - 70)  BP: 128/47 (29 May 2022 05:21) (107/55 - 131/52)  BP(mean): --  RR: 18 (29 May 2022 05:21) (18 - 18)  SpO2: 95% (29 May 2022 05:21) (93% - 95%)  I&O's Summary    28 May 2022 07:01  -  29 May 2022 07:00  --------------------------------------------------------  IN: 1500 mL / OUT: 0 mL / NET: 1500 mL        PHYSICAL EXAM:  TELE: off   Constitutional: frail, elderly female, NAD, awake and alert, well-developed  HEENT: dry mucous Membranes, Anicteric  Pulmonary: Non-labored, breath sounds bilaterally,+ diminished lung sounds   Cardiovascular: Regular, S1 and S2, + murmurs. no rubs, gallops or clicks  Gastrointestinal: Bowel Sounds present, soft, nontender.   GI: +ramey  Lymph: No peripheral edema. No lymphadenopathy.  Skin: + b/l leg ecchymosis  Psych:  Mood & affect appropriate  LABS: All Labs Reviewed:                        9.3    8.40  )-----------( 175      ( 28 May 2022 08:35 )             29.2                         9.5    10.26 )-----------( 189      ( 27 May 2022 09:22 )             29.2                         10.2   13.58 )-----------( 193      ( 26 May 2022 16:13 )             30.7     28 May 2022 08:35    135    |  101    |  66     ----------------------------<  100    4.8     |  24     |  3.10   27 May 2022 09:22    134    |  99     |  75     ----------------------------<  126    5.1     |  25     |  3.50   26 May 2022 16:13    137    |  103    |  64     ----------------------------<  122    5.8     |  27     |  3.10     Ca    7.3        28 May 2022 08:35  Ca    7.4        27 May 2022 09:22  Ca    7.8        26 May 2022 16:13  Phos  4.5       28 May 2022 08:35  Mg     2.3       28 May 2022 08:35    TPro  6.0    /  Alb  2.5    /  TBili  0.3    /  DBili  x      /  AST  26     /  ALT  7      /  AlkPhos  70     28 May 2022 08:35      CARDIAC MARKERS ( 28 May 2022 08:35 )  x     / x     / 75 U/L / x     / x

## 2022-05-29 NOTE — CHART NOTE - NSCHARTNOTEFT_GEN_A_CORE
Assessment/Follow up: Pt awake/alert at time of visit. Continues on regular, no concentrated K diet with ensure clear TID. SLP evaluation declined 5/26 by pt/pts daughter. Small appetite/po intake past few days. Pts family brings food from home. Pt states she is not drinking ensure clear TID, will decrease to once daily as she is barely drinking. No report N/V. +Constipation. BM today 5/29. Bowel regimen rx. Encourage fluids/dietary fiber. Additional food preferences/meal alternatives obtained. Recommend assistance/encouragement as tolerated.     Factors impacting intake: [ ] none [ ] nausea  [ ] vomiting [ ] diarrhea [x ] constipation  [ ]chewing problems [ ] swallowing issues  [ ] other:     Diet Presciption: Diet, Regular:   No Concentrated Potassium  Supplement Feeding Modality:  Oral  Ensure Clear Cans or Servings Per Day:  1       Frequency:  Three Times a day (05-26-22 @ 10:42)  Diet, Full Liquid (05-25-22 @ 18:50)  Diet, Clear Liquid (05-25-22 @ 18:50)    Intake:     Current Weight: Weight (kg): 34.6 (05-25 @ 17:14)  % Weight Change    Pertinent Medications: MEDICATIONS  (STANDING):  amLODIPine   Tablet 5 milliGRAM(s) Oral daily  citalopram 40 milliGRAM(s) Oral daily  dextrose 5% + sodium chloride 0.45%. 1000 milliLiter(s) (75 mL/Hr) IV Continuous <Continuous>  heparin   Injectable 5000 Unit(s) SubCutaneous every 8 hours  levothyroxine 75 MICROGram(s) Oral daily  melatonin 3 milliGRAM(s) Oral at bedtime  metoprolol succinate ER 50 milliGRAM(s) Oral daily  pantoprazole    Tablet 40 milliGRAM(s) Oral before breakfast  polyethylene glycol 3350 17 Gram(s) Oral at bedtime  senna 2 Tablet(s) Oral at bedtime    MEDICATIONS  (PRN):  benzocaine 15 mG/menthol 3.6 mG Lozenge 1 Lozenge Oral four times a day PRN Sore Throat  ondansetron    Tablet 4 milliGRAM(s) Oral three times a day PRN Nausea and/or Vomiting  traMADol 100 milliGRAM(s) Oral every 6 hours PRN Moderate Pain (4 - 6)  traMADol 50 milliGRAM(s) Oral every 6 hours PRN Mild Pain (1 - 3)    Pertinent Labs: 05-29 Na136 mmol/L Glu 124 mg/dL<H> K+ 4.0 mmol/L Cr  2.40 mg/dL<H> BUN 58 mg/dL<H> 05-28 Phos 4.5 mg/dL 05-28 Alb 2.5 g/dL<L>     CAPILLARY BLOOD GLUCOSE        Skin:     Estimated Needs:   [ ] no change since previous assessment  [ ] recalculated:     Previous Nutrition Diagnosis:   [ ] Inadequate Energy Intake [ ]Inadequate Oral Intake [ ] Excessive Energy Intake   [ ] Underweight [ ] Increased Nutrient Needs [ ] Overweight/Obesity   [ ] Altered GI Function [ ] Unintended Weight Loss [ ] Food & Nutrition Related Knowledge Deficit [ ] Malnutrition     Nutrition Diagnosis is [ ] ongoing  [ ] resolved [ ] not applicable     New Nutrition Diagnosis: [ ] not applicable       Interventions:   Recommend  [ ] Change Diet To:  [ ] Nutrition Supplement  [ ] Nutrition Support  [ ] Other:     Monitoring and Evaluation:   [ ] PO intake [ x ] Tolerance to diet prescription [ x ] weights [ x ] labs[ x ] follow up per protocol  [ ] other: Assessment/Follow up: Pt awake/alert at time of visit. Continues on regular, no concentrated K diet with ensure clear TID. SLP evaluation declined 5/26 by pt/pts daughter. Small appetite/po intake past few days however no complaints. Pts family brings food from home. Pt states she is not drinking ensure clear TID, will decrease to once daily as she is barely drinking. No report N/V. +Constipation. BM today 5/29. Bowel regimen rx. Encourage fluids/dietary fiber. Additional food preferences/meal alternatives obtained. Encourage consumption of HBV proteoin sources. Recommend assistance/encouragement as tolerated.     Factors impacting intake: [ ] none [ ] nausea  [ ] vomiting [ ] diarrhea [x ] constipation  [ ]chewing problems [ ] swallowing issues  [x ] other: lack of appetite    Diet Presciption: Diet, Regular:   No Concentrated Potassium  Supplement Feeding Modality:  Oral  Ensure Clear Cans or Servings Per Day:  1       Frequency:  Three Times a day (05-26-22 @ 10:42)  Diet, Full Liquid (05-25-22 @ 18:50)  Diet, Clear Liquid (05-25-22 @ 18:50)    Intake: Small po intake     Current Weight: Weight (kg): 34.6 (05-25 @ 17:14), no new weights, request current weight to assess.     Pertinent Medications: MEDICATIONS  (STANDING):  amLODIPine   Tablet 5 milliGRAM(s) Oral daily  citalopram 40 milliGRAM(s) Oral daily  dextrose 5% + sodium chloride 0.45%. 1000 milliLiter(s) (75 mL/Hr) IV Continuous <Continuous>  heparin   Injectable 5000 Unit(s) SubCutaneous every 8 hours  levothyroxine 75 MICROGram(s) Oral daily  melatonin 3 milliGRAM(s) Oral at bedtime  metoprolol succinate ER 50 milliGRAM(s) Oral daily  pantoprazole    Tablet 40 milliGRAM(s) Oral before breakfast  polyethylene glycol 3350 17 Gram(s) Oral at bedtime  senna 2 Tablet(s) Oral at bedtime    MEDICATIONS  (PRN):  benzocaine 15 mG/menthol 3.6 mG Lozenge 1 Lozenge Oral four times a day PRN Sore Throat  ondansetron    Tablet 4 milliGRAM(s) Oral three times a day PRN Nausea and/or Vomiting  traMADol 100 milliGRAM(s) Oral every 6 hours PRN Moderate Pain (4 - 6)  traMADol 50 milliGRAM(s) Oral every 6 hours PRN Mild Pain (1 - 3)    Pertinent Labs: 05-29 Na136 mmol/L Glu 124 mg/dL<H> K+ 4.0 mmol/L Cr  2.40 mg/dL<H> BUN 58 mg/dL<H> 05-28 Phos 4.5 mg/dL 05-28 Alb 2.5 g/dL<L>     CAPILLARY BLOOD GLUCOSE        Skin: left hip surgical incision, left forehead laceration, sacrum I. Ion 16.     Estimated Needs:   [x ] no change since previous assessment  [ ] recalculated:     Previous Nutrition Diagnosis:   [ ] Inadequate Energy Intake [ ]Inadequate Oral Intake [ ] Excessive Energy Intake   [ ] Underweight [ ] Increased Nutrient Needs [ ] Overweight/Obesity   [ ] Altered GI Function [ ] Unintended Weight Loss [ ] Food & Nutrition Related Knowledge Deficit [x ] Malnutrition     Nutrition Diagnosis is [x ] ongoing  [ ] resolved [ ] not applicable     New Nutrition Diagnosis: [x ] not applicable       Interventions:   Recommend  [ ] Change Diet To:  [x ] Nutrition Supplement: Decrease ensure clear to once daily  [ ] Nutrition Support  [x ] Other: MVI with minerals daily. Assistance/encouragement with meals/supplements.     Monitoring and Evaluation:   [x ] PO intake [ x ] Tolerance to diet prescription [ x ] weights-current [ x ] labs[ x ] follow up per protocol  [ ] other: Assessment/Follow up: Pt awake/alert at time of visit. Continues on regular, no concentrated K diet with ensure clear TID. SLP evaluation declined 5/26 by pt/pts daughter. Small appetite/po intake past few days however no complaints. Pts family brings food from home to supplement po intake. Observed with snacks at bedside. Pt states she is not drinking ensure clear TID, will decrease to once daily as pt barely drinking. No report N/V. +Constipation. BM today 5/29. Bowel regimen rx. Encourage fluids/dietary fiber. Additional food preferences/meal alternatives obtained. Encourage consumption of HBV protein sources. Recommend assistance/encouragement with meals/supplement as tolerated.     Factors impacting intake: [ ] none [ ] nausea  [ ] vomiting [ ] diarrhea [x ] constipation  [ ]chewing problems [ ] swallowing issues  [x ] other: lack of appetite    Diet Presciption: Diet, Regular:   No Concentrated Potassium  Supplement Feeding Modality:  Oral  Ensure Clear Cans or Servings Per Day:  1       Frequency:  Three Times a day (05-26-22 @ 10:42)  Diet, Full Liquid (05-25-22 @ 18:50)  Diet, Clear Liquid (05-25-22 @ 18:50)    Intake: Small po intake     Current Weight: Weight (kg): 34.6 (05-25 @ 17:14), no new weights, request current weight to assess.     Pertinent Medications: MEDICATIONS  (STANDING):  amLODIPine   Tablet 5 milliGRAM(s) Oral daily  citalopram 40 milliGRAM(s) Oral daily  dextrose 5% + sodium chloride 0.45%. 1000 milliLiter(s) (75 mL/Hr) IV Continuous <Continuous>  heparin   Injectable 5000 Unit(s) SubCutaneous every 8 hours  levothyroxine 75 MICROGram(s) Oral daily  melatonin 3 milliGRAM(s) Oral at bedtime  metoprolol succinate ER 50 milliGRAM(s) Oral daily  pantoprazole    Tablet 40 milliGRAM(s) Oral before breakfast  polyethylene glycol 3350 17 Gram(s) Oral at bedtime  senna 2 Tablet(s) Oral at bedtime    MEDICATIONS  (PRN):  benzocaine 15 mG/menthol 3.6 mG Lozenge 1 Lozenge Oral four times a day PRN Sore Throat  ondansetron    Tablet 4 milliGRAM(s) Oral three times a day PRN Nausea and/or Vomiting  traMADol 100 milliGRAM(s) Oral every 6 hours PRN Moderate Pain (4 - 6)  traMADol 50 milliGRAM(s) Oral every 6 hours PRN Mild Pain (1 - 3)    Pertinent Labs: 05-29 Na136 mmol/L Glu 124 mg/dL<H> K+ 4.0 mmol/L Cr  2.40 mg/dL<H> BUN 58 mg/dL<H> 05-28 Phos 4.5 mg/dL 05-28 Alb 2.5 g/dL<L>     CAPILLARY BLOOD GLUCOSE        Skin: left hip surgical incision, left forehead laceration, sacrum I. Ion 16.     Estimated Needs:   [x ] no change since previous assessment  [ ] recalculated:     Previous Nutrition Diagnosis:   [ ] Inadequate Energy Intake [ ]Inadequate Oral Intake [ ] Excessive Energy Intake   [ ] Underweight [ ] Increased Nutrient Needs [ ] Overweight/Obesity   [ ] Altered GI Function [ ] Unintended Weight Loss [ ] Food & Nutrition Related Knowledge Deficit [x ] Malnutrition     Nutrition Diagnosis is [x ] ongoing  [ ] resolved [ ] not applicable     New Nutrition Diagnosis: [x ] not applicable       Interventions:   Recommend  [ ] Change Diet To:  [x ] Nutrition Supplement: Decrease ensure clear to once daily  [ ] Nutrition Support  [x ] Other: MVI with minerals daily. Assistance/encouragement with meals/supplements.     Monitoring and Evaluation:   [x ] PO intake [ x ] Tolerance to diet prescription [ x ] weights-current [ x ] labs[ x ] follow up per protocol  [ ] other:

## 2022-05-29 NOTE — PROGRESS NOTE ADULT - PROBLEM SELECTOR PLAN 9
Chronic   - On synthroid 50 mcg qd, will continue   - TSH 9.2, pending TFTs Chronic   - TSH 9.2, T3 1.12 (low) and t4 1.4   - On synthroid 50 mcg qd, will increase to 75mcg daily

## 2022-05-29 NOTE — PROGRESS NOTE ADULT - PROBLEM SELECTOR PLAN 3
GIL (on CKD) likely 2/2 pre-renal azotemia in the setting of dehydration,  poor PO intake  - Creatinine 1.9 on admission - then worsening post op likely multifactorial 2/2 hypotension and anemia  -Cr. improving this AM 3.50 -> 3.10  - Monitor BMP  - Avoid nephrotoxic medications  - Monitor renal indices GIL (on CKD) likely 2/2 pre-renal azotemia in the setting of dehydration,  poor PO intake  - Creatinine 1.9 on admission - then worsening post op likely multifactorial 2/2 hypotension and anemia  -Cr. improving this AM 3.50 -> 3.10 ->2.40  - ramey removed, will attempt TOV  - Monitor BMP  - Avoid nephrotoxic medications  - Monitor renal indices

## 2022-05-29 NOTE — PROGRESS NOTE ADULT - ATTENDING COMMENTS
89 y/o female w/ PMHx of HTN, HF (unknown EF), hypotension, depression and anxiety presents after having a fall earlier today. Patient admitted for L hip fracture, plan for OR today     Patient seen and examined. States she feels well, complains of pain in her L leg upon movement, no pain at rest. Patient also complains of slight numbness/tingling in L thigh which comes and goes. Patient agreeable to go to OR today. Patient is moderate risk for moderate risk procedure and is medically optimized- pending cardiac clearance- check TTE. Patient also confirmed she would like to be DNR/DNI after the procedure. Discussed with Ortho and Palliative care.
89 y/o female w/ PMHx of HTN, HF (unknown EF), hypotension, depression and anxiety presents after having a fall. Patient admitted for L hip fracture, now s/p L hip IMN on 5/25/22, post op course complicated by anemia and GIL    Patient seen and examined. Patient states she feels weak and tired. States she does not have much of an appetite. Patient noted to have worsening renal function, likely ATN, continue IVF. Patient again refused lokelma this AM, made aware of risks/benefits, patient states she understands. Monitor Cr closely. Patient's L hip pain well controlled s/p surgery. F/U TFTs in AM. Plan of care discussed with daughter at bedside.
89 y/o female w/ PMHx of HTN, HF (unknown EF), hypotension, depression and anxiety presents after having a fall earlier today. Patient admitted for L hip fracture, now s/p L hip IMN on 5/25/22, post op course complicated by anemia and GIL    Patient seen and examined. Patient states she feels well, complaining of feeling some numbness/tingling in her R foot, but sensation intact on exam. Patient noted to be anemic post op, received 1 unit PRBC overnight, Orthopedic team giving another unit PRBC today. Repeat Hgb improved. Patient with GIL, worsening, likely in setting of anemia and hypotension. Nephrology consulted, appreciate recs, continue IVF, avoid nephrotoxic meds, strict Is and Os. Patient also hyperkalemic ordered for lokelma (initially refused this AM as did not like taste of medication- expressed to patient and son at bedside importance of medication and serious side effects of hyperkalemia- including death- son states he will make sure patient drinks medication)
89 y/o female w/ PMHx of HTN, HF (unknown EF), hypotension, depression and anxiety presents after having a fall. Patient admitted for L hip fracture, now s/p L hip IMN on 5/25/22, post op course complicated by anemia and GIL    Patient seen and examined. Patient states she feels a bit better. Cr improving today, continue IV fluids. Encouraged PO water intake. Continue ramey, if Cr continues to improve, will consider TOV. Patient with elevated TSH, low T3, will increase levothyroxine from 50mcg to 75mcg daily. Plan of care discussed with daughter at bedside.
89 y/o female w/ PMHx of HTN, HF (unknown EF), hypotension, depression and anxiety presents after having a fall. Patient admitted for L hip fracture, now s/p L hip IMN on 5/25/22, post op course complicated by anemia and GIL    Patient seen and examined. Patient states she feels a bit better. Cr improving today, d/c ramey, give TOV, d/c IVF. Encouraged PO water intake. Patient noted to have downtrending Hgb this AM, discussed with Orthopedics, recommend 1 unit PRBC today, f/u repeat H/H this evening. RN notified me of blood in stool this afternoon, discussed with daughter and son-in-law at bedside, given drop in hemoglobin and blood in stool, will consult GI, hold heparin, check FOBT. Family does state patient has history of hemorrhoids and was straining to get her stool out, so bleeding could be 2/2 to hemorrhoids.

## 2022-05-29 NOTE — PROGRESS NOTE ADULT - SUBJECTIVE AND OBJECTIVE BOX
Patient is a 89y old  Female who presents with a chief complaint of Hip fracture (27 May 2022 11:12)       HPI:  89 y/o female w/ PMHx of HTN, HF (unknown EF), hypertension, depression and anxiety presents after having a fall earlier today. Patient chocked on something, was coughing,  went to bathroom and suddenly fell. She hit head in the frontal side, L elbow and hip. Had an episode of urinary incontinence after fall. Patient's son helped to get out from the floor, denies LOC. Patient denies presyncopal symptoms. Patient has multiple falls in the last couple of weeks due to unstable gait, however patient refuse to use walker per son, last fall was 3 days ago. Patent endorses poor appetite has 2 meals per day. Has constipation, not able to remember when was the last BM. She has chronic SIDHU.   At this time she denies headaches, dizziness, nausea, vomiting, chest pain, abdominal pain, fever, hematuria, melena, hematochezia or other symptoms.   Patient has been living with her son for the last 3 months.     Renal consulted for GIL/CKD. S/p OR. +Rubin. Poor appetite. H/o of CKD per family        No SOB/N/V    PAST MEDICAL & SURGICAL HISTORY:  Hypothyroid      HTN (hypertension)      HF (heart failure)      Anxiety and depression      S/P hysterectomy      FH: cholecystectomy           FAMILY HISTORY:  FH: heart disease (Father)    NC    Social History:Non smoker    MEDICATIONS  (STANDING):  amLODIPine   Tablet 5 milliGRAM(s) Oral daily  citalopram 40 milliGRAM(s) Oral daily  dextrose 5% + sodium chloride 0.45%. 1000 milliLiter(s) (75 mL/Hr) IV Continuous <Continuous>  heparin   Injectable 5000 Unit(s) SubCutaneous every 8 hours  levothyroxine 50 MICROGram(s) Oral daily  melatonin 3 milliGRAM(s) Oral at bedtime  metoprolol succinate ER 50 milliGRAM(s) Oral daily  pantoprazole    Tablet 40 milliGRAM(s) Oral before breakfast  polyethylene glycol 3350 17 Gram(s) Oral at bedtime  senna 2 Tablet(s) Oral at bedtime    MEDICATIONS  (PRN):  benzocaine 15 mG/menthol 3.6 mG Lozenge 1 Lozenge Oral four times a day PRN Sore Throat  ondansetron    Tablet 4 milliGRAM(s) Oral three times a day PRN Nausea and/or Vomiting  traMADol 100 milliGRAM(s) Oral every 6 hours PRN Moderate Pain (4 - 6)  traMADol 50 milliGRAM(s) Oral every 6 hours PRN Mild Pain (1 - 3)   Meds reviewed    Allergies    sulfa drugs (Nausea)    Intolerances    codeine (Nausea)       REVIEW OF SYSTEMS: As per HPI, otherwise negative     ICU Vital Signs Last 24 Hrs  T(C): 36.9 (29 May 2022 05:21), Max: 36.9 (28 May 2022 13:05)  T(F): 98.5 (29 May 2022 05:21), Max: 98.5 (29 May 2022 05:21)  HR: 64 (29 May 2022 05:21) (64 - 70)  BP: 128/47 (29 May 2022 05:21) (107/55 - 131/52)  BP(mean): --  ABP: --  ABP(mean): --  RR: 18 (29 May 2022 05:21) (18 - 18)  SpO2: 95% (29 May 2022 05:21) (93% - 95%)      PHYSICAL EXAM:  GENERAL: NAD  HEENT:  anicteric, moist mucous membranes  CHEST/LUNG:  CTA b/l, no rales, wheezes, or rhonchi  HEART:  RRR, S1, S2  ABDOMEN:  BS+, soft, nontender, nondistended  EXTREMITIES:  no edema, moves UE spont  NERVOUS SYSTEM: answers questions and follows commands appropriately      LABS:                                   8.3    7.59  )-----------( 176      ( 29 May 2022 08:57 )             25.8     05-29    136  |  102  |  58<H>  ----------------------------<  124<H>  4.0   |  24  |  2.40<H>    Ca    7.4<L>      29 May 2022 08:57  Phos  4.5     05-28  Mg     2.3     05-28    TPro  6.0  /  Alb  2.5<L>  /  TBili  0.3  /  DBili  x   /  AST  26  /  ALT  7<L>  /  AlkPhos  70  05-28

## 2022-05-29 NOTE — PROGRESS NOTE ADULT - ASSESSMENT
89 y/o female w/ PMHx of HTN, HF (unknown EF), hypertension, depression and anxiety presents after having a fall yesterday, cardio consult requested for cardiac clearance for surgery.    s/p Fall, Cardiac Optimization, HTN, HF  - Fall appeared to be mechanical in nature.    - Now s/p Left hip IMN.  Tolerated well with no obvious cardiac complications  - Continue pain control management    - ECHO showed normal LV & RV size and function EF 50%, mod AS, mod AI, mild MS, mild to mod MR  - No evidence of any meaningful volume overload   - No evidence of cardiac arrhythmias, now off tele.   - No clear evidence of acute ischemia     - BP and HR well controlled  - Continue home Amlodipine and Toprol XL    - Continue to hold home Lasix;  Okay with gentle hydration.      - Monitor and replete lytes, keep K>4, Mg>2.  - Will continue to follow.      Иван Paz NP  Nurse Practitioner- Cardiology   Spectra #[phone]

## 2022-05-30 LAB
ANION GAP SERPL CALC-SCNC: 8 MMOL/L — SIGNIFICANT CHANGE UP (ref 5–17)
BASOPHILS # BLD AUTO: 0.02 K/UL — SIGNIFICANT CHANGE UP (ref 0–0.2)
BASOPHILS NFR BLD AUTO: 0.3 % — SIGNIFICANT CHANGE UP (ref 0–2)
BUN SERPL-MCNC: 45 MG/DL — HIGH (ref 7–23)
CALCIUM SERPL-MCNC: 7.7 MG/DL — LOW (ref 8.5–10.1)
CHLORIDE SERPL-SCNC: 110 MMOL/L — HIGH (ref 96–108)
CO2 SERPL-SCNC: 23 MMOL/L — SIGNIFICANT CHANGE UP (ref 22–31)
CREAT SERPL-MCNC: 2 MG/DL — HIGH (ref 0.5–1.3)
EGFR: 23 ML/MIN/1.73M2 — LOW
EOSINOPHIL # BLD AUTO: 0.07 K/UL — SIGNIFICANT CHANGE UP (ref 0–0.5)
EOSINOPHIL NFR BLD AUTO: 0.9 % — SIGNIFICANT CHANGE UP (ref 0–6)
GLUCOSE SERPL-MCNC: 91 MG/DL — SIGNIFICANT CHANGE UP (ref 70–99)
HCT VFR BLD CALC: 28.9 % — LOW (ref 34.5–45)
HGB BLD-MCNC: 9.4 G/DL — LOW (ref 11.5–15.5)
IMM GRANULOCYTES NFR BLD AUTO: 1.2 % — SIGNIFICANT CHANGE UP (ref 0–1.5)
LYMPHOCYTES # BLD AUTO: 1.19 K/UL — SIGNIFICANT CHANGE UP (ref 1–3.3)
LYMPHOCYTES # BLD AUTO: 15.6 % — SIGNIFICANT CHANGE UP (ref 13–44)
MCHC RBC-ENTMCNC: 29.6 PG — SIGNIFICANT CHANGE UP (ref 27–34)
MCHC RBC-ENTMCNC: 32.5 GM/DL — SIGNIFICANT CHANGE UP (ref 32–36)
MCV RBC AUTO: 90.9 FL — SIGNIFICANT CHANGE UP (ref 80–100)
MONOCYTES # BLD AUTO: 1.14 K/UL — HIGH (ref 0–0.9)
MONOCYTES NFR BLD AUTO: 14.9 % — HIGH (ref 2–14)
NEUTROPHILS # BLD AUTO: 5.14 K/UL — SIGNIFICANT CHANGE UP (ref 1.8–7.4)
NEUTROPHILS NFR BLD AUTO: 67.1 % — SIGNIFICANT CHANGE UP (ref 43–77)
NRBC # BLD: 0 /100 WBCS — SIGNIFICANT CHANGE UP (ref 0–0)
PLATELET # BLD AUTO: 195 K/UL — SIGNIFICANT CHANGE UP (ref 150–400)
POTASSIUM SERPL-MCNC: 4.3 MMOL/L — SIGNIFICANT CHANGE UP (ref 3.5–5.3)
POTASSIUM SERPL-SCNC: 4.3 MMOL/L — SIGNIFICANT CHANGE UP (ref 3.5–5.3)
RBC # BLD: 3.18 M/UL — LOW (ref 3.8–5.2)
RBC # FLD: 14.9 % — HIGH (ref 10.3–14.5)
SARS-COV-2 RNA SPEC QL NAA+PROBE: SIGNIFICANT CHANGE UP
SODIUM SERPL-SCNC: 141 MMOL/L — SIGNIFICANT CHANGE UP (ref 135–145)
WBC # BLD: 7.65 K/UL — SIGNIFICANT CHANGE UP (ref 3.8–10.5)
WBC # FLD AUTO: 7.65 K/UL — SIGNIFICANT CHANGE UP (ref 3.8–10.5)

## 2022-05-30 PROCEDURE — 99232 SBSQ HOSP IP/OBS MODERATE 35: CPT | Mod: GC

## 2022-05-30 PROCEDURE — 99232 SBSQ HOSP IP/OBS MODERATE 35: CPT

## 2022-05-30 RX ORDER — HEPARIN SODIUM 5000 [USP'U]/ML
5000 INJECTION INTRAVENOUS; SUBCUTANEOUS EVERY 8 HOURS
Refills: 0 | Status: DISCONTINUED | OUTPATIENT
Start: 2022-05-30 | End: 2022-05-31

## 2022-05-30 RX ADMIN — Medication 75 MICROGRAM(S): at 05:30

## 2022-05-30 RX ADMIN — Medication 50 MILLIGRAM(S): at 05:30

## 2022-05-30 RX ADMIN — PANTOPRAZOLE SODIUM 40 MILLIGRAM(S): 20 TABLET, DELAYED RELEASE ORAL at 05:30

## 2022-05-30 RX ADMIN — HEPARIN SODIUM 5000 UNIT(S): 5000 INJECTION INTRAVENOUS; SUBCUTANEOUS at 22:23

## 2022-05-30 RX ADMIN — AMLODIPINE BESYLATE 5 MILLIGRAM(S): 2.5 TABLET ORAL at 05:30

## 2022-05-30 RX ADMIN — Medication 3 MILLIGRAM(S): at 22:23

## 2022-05-30 RX ADMIN — CITALOPRAM 40 MILLIGRAM(S): 10 TABLET, FILM COATED ORAL at 11:32

## 2022-05-30 NOTE — PROGRESS NOTE ADULT - PROBLEM SELECTOR PLAN 2
Patient has recurrent fall, likely multifactorial due to advance age, unstable gait and refuse to use walker   - CT head and c spine: scalp hematoma w/o intracranial hemorrhage, no c spine fracture  - Left hip fracture   - Fall precautions, bed alarm, chair alarm  - PT recs REKHA   - Nutrition following
Patient has recurrent fall, likely multifactorial due to advance age, unstable gait and refuse to use walker   - CT head and c spine: scalp hematoma w/o intracranial hemorrhage, no c spine fracture  - Left hip fracture   - Fall precautions, bed alarm, chair alarm  - PT eval  - Social work eval  - Nutrition consulted in the ED
Patient has recurrent fall, likely multifactorial due to advance age, unstable gait and refuse to use walker   -  CT head and c spine: scalp hematoma w/o intracranial hemorrhage, no c spine fracture  - Left hip fracture   - Fall precautions, bed alarm, chair alarm  - PT eval  - Social work eval  - Nutrition consulted in the ED
Patient has recurrent fall, likely multifactorial due to advance age, unstable gait and refuse to use walker   - CT head and c spine: scalp hematoma w/o intracranial hemorrhage, no c spine fracture  - Left hip fracture   - Fall precautions, bed alarm, chair alarm  - PT recs REKHA   - Nutrition following

## 2022-05-30 NOTE — CONSULT NOTE ADULT - ASSESSMENT
89 year old female with mechanical fall     Anemia   2/2 to hematoma and fall   No given bleeding appreciated   Transfuse to a goal hemoglobin near 8   No gi intervention       Fall   Fall precautions   Ortho follow up

## 2022-05-30 NOTE — CONSULT NOTE ADULT - SUBJECTIVE AND OBJECTIVE BOX
Patient is a 89y old  Female who presents with a chief complaint of Hip fracture (30 May 2022 10:53)     .     HPI:    HPI:  89 y/o female w/ PMHx of HTN, HF (unknown EF), hypertension, depression and anxiety presents after having a fall earlier today. Patient chocked on something, was coughing,  went to bathroom and suddenly fell. She hit head in the frontal side, L elbow and hip. Had an episode of urinary incontinence after fall. Patient's son helped to get out from the floor, denies LOC. Patient denies presyncopal symptoms. Patient has multiple falls in the last couple of weeks due to unstable gait, however patient refuse to use walker per son, last fall was 3 days ago. Patent endorses poor appetite has 2 meals per day. Has constipation, not able to remember when was the last BM. She has chronic SIDHU.   At this time she denies headaches, dizziness, nausea, vomiting, chest pain, abdominal pain, fever, hematuria, melena, hematochezia or other symptoms.               REVIEW OF SYSTEMS  Constitutional:   No fever, no fatigue, no pallor, no night sweats, no weight loss.  HEENT:   No eye pain, no vision changes, no icterus, no mouth ulcers.  Respiratory:   No shortness of breath, no cough, no respiratory distress.   Cardiovascular:   No chest pain, no palpitations.   Gastrointestinal: No abdominal pain, no nausea, no vomiting , no diahrrea, no constipation, no hematochezia,no melena.  Skin:   No rashes, no jaundice, no eczema.   Musculoskeletal:   No joint pain, no swelling, no myalgia.   Neurologic:   No headache, no seizure, no weakness.     ___________________________________________________________________________________________  Allergies    sulfa drugs (Nausea)    Intolerances    codeine (Nausea)    MEDICATIONS  (STANDING):  amLODIPine   Tablet 5 milliGRAM(s) Oral daily  citalopram 40 milliGRAM(s) Oral daily  levothyroxine 75 MICROGram(s) Oral daily  melatonin 3 milliGRAM(s) Oral at bedtime  metoprolol succinate ER 50 milliGRAM(s) Oral daily  pantoprazole    Tablet 40 milliGRAM(s) Oral before breakfast  polyethylene glycol 3350 17 Gram(s) Oral at bedtime  senna 2 Tablet(s) Oral at bedtime    MEDICATIONS  (PRN):  benzocaine 15 mG/menthol 3.6 mG Lozenge 1 Lozenge Oral four times a day PRN Sore Throat  ondansetron    Tablet 4 milliGRAM(s) Oral three times a day PRN Nausea and/or Vomiting  traMADol 100 milliGRAM(s) Oral every 6 hours PRN Moderate Pain (4 - 6)  traMADol 50 milliGRAM(s) Oral every 6 hours PRN Mild Pain (1 - 3)      PAST MEDICAL & SURGICAL HISTORY:  Hypothyroid      HTN (hypertension)      HF (heart failure)      Anxiety and depression      S/P hysterectomy      FH: cholecystectomy        FAMILY HISTORY:  FH: heart disease (Father)      Social History: No hsitory of : Tobacco use, IVDA, EToH  ______________________________________________________________________________________    PHYSICAL EXAM    Daily     Daily   BMI: 12.3 (05-25 @ 17:14)  Change in Weight:  Vital Signs Last 24 Hrs  T(C): 37 (30 May 2022 05:13), Max: 37.2 (29 May 2022 20:44)  T(F): 98.6 (30 May 2022 05:13), Max: 98.9 (29 May 2022 20:44)  HR: 69 (30 May 2022 10:01) (69 - 83)  BP: 137/53 (30 May 2022 10:01) (121/52 - 177/61)  BP(mean): --  RR: 18 (30 May 2022 10:01) (17 - 18)  SpO2: 97% (30 May 2022 10:01) (93% - 97%)    General:  Well developed, well nourished, alert and active, no pallor, NAD.  HEENT:    Normal appearance of conjunctiva, ears, nose, lips, oropharynx, and oral mucosa, anicteric.  Neck:  No masses, no asymmetry.  Lymph Nodes:  No lymphadenopathy.   Cardiovascular:  RRR normal S1/S2, no murmur.  Respiratory:  CTA B/L, normal respiratory effort.   Abdominal:   soft, no masses or tenderness, normoactive BS, NT/ND, no HSM.  Extremities:   No clubbing or cyanosis, normal capillary refill, no edema.   Skin:   No rash, jaundice, lesions, eczema.   Musculoskeletal:  No joint swelling, erythema or tenderness.   Neuro: No focal deficits.   Other:   _______________________________________________________________________________________________  Lab Results:                          9.4    7.65  )-----------( 195      ( 30 May 2022 08:47 )             28.9     05-30    141  |  110<H>  |  45<H>  ----------------------------<  91  4.3   |  23  |  2.00<H>    Ca    7.7<L>      30 May 2022 08:47                Stool Results:          RADIOLOGY RESULTS:    SURGICAL PATHOLOGY:

## 2022-05-30 NOTE — PROGRESS NOTE ADULT - PROBLEM SELECTOR PLAN 6
GIL (on CKD) likely 2/2 pre-renal azotemia in the setting of dehydration,  poor PO intake  - Creatinine 1.9 on admission   - Baseline creatinine unknown  - Worsened Cr this AM, likely multifactorial 2/2 hypotension and anemia, correct blood loss and consider starting midodrine 5mg after orthostatics when pt is more mobile after blood transfx's  - Monitor BMP  - Avoid nephrotoxic medications  - Monitor renal indices  - nephro consult in am if cr worsens
Likely reactive due to fall. RESOLVED   - No signs or symptoms of active infectious process   - Trend CBC with diff
GIL (on CKD) likely 2/2 pre-renal azotemia in the setting of dehydration,  poor PO intake  - Creatinine 1.9 on admission   - Baseline creatinine unknown  - IVF at 60 cc/hr for 12 hours  - Monitor BMP  - Avoid nephrotoxic medications  - Monitor renal indices  - nephro consult in am if cr worsens
GIL (on CKD) likely 2/2 pre-renal azotemia in the setting of dehydration,  poor PO intake  - Creatinine 1.9 on admission   - Baseline creatinine unknown  - Worsened Cr this AM, likely multifactorial 2/2 hypotension and anemia  - Monitor BMP  - Avoid nephrotoxic medications  - Monitor renal indices

## 2022-05-30 NOTE — PROGRESS NOTE ADULT - SUBJECTIVE AND OBJECTIVE BOX
[facility] -- Donnell Downey, Tom, Sanket, Tunde Gracia, Lali Will: Office # [phone]    Follow Up:   s/p Fall, Cardiac Optimization    Subjective/Observations: Patient seen and examined. Patient awake, alert, resting in bed. No complaints of chest pain, dyspnea, palpitations or dizziness. No signs of orthopnea or PND. Tolerating room air.     REVIEW OF SYSTEMS: All other review of systems are negative unless indicated above    PAST MEDICAL & SURGICAL HISTORY:  Hypothyroid      HTN (hypertension)      HF (heart failure)      Anxiety and depression      S/P hysterectomy      FH: cholecystectomy    MEDICATIONS  (STANDING):  amLODIPine   Tablet 5 milliGRAM(s) Oral daily  citalopram 40 milliGRAM(s) Oral daily  levothyroxine 75 MICROGram(s) Oral daily  melatonin 3 milliGRAM(s) Oral at bedtime  metoprolol succinate ER 50 milliGRAM(s) Oral daily  pantoprazole    Tablet 40 milliGRAM(s) Oral before breakfast  polyethylene glycol 3350 17 Gram(s) Oral at bedtime  senna 2 Tablet(s) Oral at bedtime    MEDICATIONS  (PRN):  benzocaine 15 mG/menthol 3.6 mG Lozenge 1 Lozenge Oral four times a day PRN Sore Throat  ondansetron    Tablet 4 milliGRAM(s) Oral three times a day PRN Nausea and/or Vomiting  traMADol 100 milliGRAM(s) Oral every 6 hours PRN Moderate Pain (4 - 6)  traMADol 50 milliGRAM(s) Oral every 6 hours PRN Mild Pain (1 - 3)    Allergies  sulfa drugs (Nausea)    Intolerances  codeine (Nausea)    Vital Signs Last 24 Hrs  T(C): 37 (30 May 2022 05:13), Max: 37.2 (29 May 2022 20:44)  T(F): 98.6 (30 May 2022 05:13), Max: 98.9 (29 May 2022 20:44)  HR: 69 (30 May 2022 10:01) (69 - 83)  BP: 137/53 (30 May 2022 10:01) (121/52 - 177/61)  BP(mean): --  RR: 18 (30 May 2022 10:01) (17 - 18)  SpO2: 97% (30 May 2022 10:01) (93% - 97%)  I&O's Summary    29 May 2022 07:01  -  30 May 2022 07:00  --------------------------------------------------------  IN: 315 mL / OUT: 101 mL / NET: 214 mL    TELE: Not on telemetry   PHYSICAL EXAM:  Constitutional: NAD, awake and alert, Frail   HEENT: Moist Mucous Membranes, Anicteric  Pulmonary: Non-labored, breath sounds are clear bilaterally, Diminished at bases No wheezing, rales or rhonchi  Cardiovascular: Regular, S1 and S2, + murmurs, No rubs, gallops or clicks  Gastrointestinal:  soft, nontender, nondistended   Lymph: No peripheral edema. No lymphadenopathy.   Skin: No visible rashes or ulcers.  Psych:  Mood & affect appropriate      LABS: All Labs Reviewed:                        9.4    7.65  )-----------( 195      ( 30 May 2022 08:47 )             28.9                         10.0   x     )-----------( x        ( 29 May 2022 18:32 )             30.0                         8.3    7.59  )-----------( 176      ( 29 May 2022 08:57 )             25.8     30 May 2022 08:47    141    |  110    |  45     ----------------------------<  91     4.3     |  23     |  2.00   29 May 2022 08:57    136    |  102    |  58     ----------------------------<  124    4.0     |  24     |  2.40   28 May 2022 08:35    135    |  101    |  66     ----------------------------<  100    4.8     |  24     |  3.10     Ca    7.7        30 May 2022 08:47  Ca    7.4        29 May 2022 08:57  Ca    7.3        28 May 2022 08:35  Phos  4.5       28 May 2022 08:35  Mg     2.3       28 May 2022 08:35    TPro  6.0    /  Alb  2.5    /  TBili  0.3    /  DBili  x      /  AST  26     /  ALT  7      /  AlkPhos  70     28 May 2022 08:35     Creatine Kinase, Serum: 75 U/L (05-28-22 @ 08:35)  Troponin I, High Sensitivity Result: 23.5 ng/L (05-24-22 @ 21:33)    12 Lead ECG:   Ventricular Rate 68 BPM    Atrial Rate 68 BPM    P-R Interval 150 ms    QRS Duration 74 ms    Q-T Interval 456 ms    QTC Calculation(Bazett) 484 ms    P Axis 27 degrees    R Axis 21 degrees    T Axis 53 degrees    Diagnosis Line Normal sinus rhythm  Normal ECG  No previous ECGs available  Confirmed by Louis Coles MD ([id number]) on 5/25/2022 1:26:06 PM (05-24-22 @ 22:01)      ACC: [id number] EXAM:  ECHO TTE WO CON COMP W DOPP                          PROCEDURE DATE:  05/25/2022          INTERPRETATION:  INDICATION: Atrial fibrillation  Sonographer     Blood Pressure 109/48    Height 152 cm     Weight 30.8 kg       BSA 1.18   sq m    Dimensions:  LA 2.3       Normal Values: 2.0 - 4.0 cm  Ao 2.9        Normal Values: 2.0 - 3.8 cm  SEPTUM 1.2       Normal Values: 0.6 - 1.2 cm  PWT 0.8       Normal Values: 0.6 - 1.1 cm  LVIDd 3.7         Normal Values: 3.0 - 5.6 cm  LVIDs 2.9         Normal Values: 1.8 - 4.0 cm      OBSERVATIONS:  Mitral Valve: Mitral annular calcification with calcified leaflets. Mean   transmitral valve gradient equals 4 mmHg at a heart rate of 63 beats from   minute. This is consistent with mild mitral stenosis.  Mild to moderate MR  Aortic Valve/Aorta: Calcified trileaflet aortic valve with decreased   opening. Peak transaortic valve gradient is 23 mmHg with a mean   transaortic valve gradient 13.9 mmHg. The aortic valve area is calculated   to be 1.2 sq cm by continuity equation. This is consistent with moderate   aortic stenosis.  Moderate AI  Tricuspid Valve: normal with trace TR.  Pulmonic Valve: Mild PI  Left Atrium: normal  Right Atrium: Not well-visualized  Left Ventricle: normal LV size and systolic function, estimated LVEF of   60%.  Right Ventricle: Grossly normal size and systolic function.  Pericardium: no significant pericardial effusion.    IMPRESSION:  Normal left ventricular internal dimensions and systolic function,   estimated LVEF of 60%.  Grossly normal RV size and systolic function.  Calcified trileaflet aortic valve with moderate aortic stenosis, moderate   AI.  Mild mitral stenosis with mild to moderate mitral regurgitation  No significant pericardialeffusion.    --- End of Report ---  ASHLEY COUCH MD; Attending Cardiologist  This document has been electronically signed. May 25 2022  1:26PM

## 2022-05-30 NOTE — PROGRESS NOTE ADULT - PROBLEM SELECTOR PLAN 5
Pt history of iron deficiency anemia, had iron infusions in the past  - H/H 10.1/32.1 on admission, baseline hemoglobin unknown  - Currently hemodynamically stable, monitor for signs and symptoms of active bleeding  - Consider transfusion for hemoglobin <7   - Blood transfusion consent signed and placed in chart  - Maintain active type and screen  - iron studies noted  - F/u AM CBC
Patient reports history of dysphagia, choked on something 5/25  - S&S attempted to assess pt, daughter at bedside & pt both refused and want to proceed with regular diet despite known risk of aspiration
Patient reports history of dysphagia, choked on something 5/25  - S&S attempted to assess pt, daughter at bedside & pt both refused and want to proceed with regular diet despite known risk of aspiration
Pt history of iron deficiency anemia, had iron infusions in the past  - H/H 10.1/32.1 on admission, baseline hemoglobin unknown  - Currently hemodynamically stable, monitor for signs and symptoms of active bleeding  - Consider transfusion for hemoglobin <8  - Blood transfusion consent signed and placed in chart  - Maintain active type and screen  - s/p 2u PRBC perioperatively for acute blood loss anemia  - iron studies noted  - F/u AM CBC
Patient reports history of dysphagia, choked on something 5/25  - S&S attempted to assess pt, daughter at bedside & pt both refused and want to proceed with regular diet despite known risk of aspiration
Pt history of iron deficiency anemia, had iron infusions in the past  - H/H 10.1/32.1 on admission, baseline hemoglobin unknown  - Currently hemodynamically stable, monitor for signs and symptoms of active bleeding  - Consider transfusion for hemoglobin <8  - Blood transfusion consent signed and placed in chart  - Maintain active type and screen  - s/p 2u PRBC perioperatively for acute blood loss anemia  - iron studies noted  - F/u AM CBC

## 2022-05-30 NOTE — PROGRESS NOTE ADULT - SUBJECTIVE AND OBJECTIVE BOX
INTERVAL HPI/OVERNIGHT EVENTS:  Patient seen and examined at bedside. States she is feeling better today. Patient appears more alert. States her appetite has improved. States she is also urinating well. Patient had another BM last night, but no signs of bleeding. Hgb stable this AM.     MEDICATIONS  (STANDING):  amLODIPine   Tablet 5 milliGRAM(s) Oral daily  citalopram 40 milliGRAM(s) Oral daily  heparin   Injectable 5000 Unit(s) SubCutaneous every 8 hours  levothyroxine 75 MICROGram(s) Oral daily  melatonin 3 milliGRAM(s) Oral at bedtime  metoprolol succinate ER 50 milliGRAM(s) Oral daily  pantoprazole    Tablet 40 milliGRAM(s) Oral before breakfast  polyethylene glycol 3350 17 Gram(s) Oral at bedtime  senna 2 Tablet(s) Oral at bedtime    MEDICATIONS  (PRN):  benzocaine 15 mG/menthol 3.6 mG Lozenge 1 Lozenge Oral four times a day PRN Sore Throat  ondansetron    Tablet 4 milliGRAM(s) Oral three times a day PRN Nausea and/or Vomiting  traMADol 100 milliGRAM(s) Oral every 6 hours PRN Moderate Pain (4 - 6)  traMADol 50 milliGRAM(s) Oral every 6 hours PRN Mild Pain (1 - 3)      Allergies    sulfa drugs (Nausea)    Intolerances    codeine (Nausea)    REVIEW OF SYSTEMS:  Constitutional: denies fever, chills  HEENT: denies headache, dizziness, or lightheadedness  Respiratory: denies SOB, cough, or wheezing  Cardiovascular: denies CP, palpitations  Gastrointestinal: denies nausea, vomiting, diarrhea, constipation, abdominal pain, or bloody stools  Genitourinary: denies painful urination, increased frequency, urgency, or bloody urine  Skin/Breast: denies rashes or itching  Musculoskeletal: denies muscle aches, joint swelling, or muscle weakness  Neurologic: denies loss of sensation, numbness, or tingling    Vital Signs Last 24 Hrs  T(C): 36.7 (30 May 2022 14:14), Max: 37.2 (29 May 2022 20:44)  T(F): 98 (30 May 2022 14:14), Max: 98.9 (29 May 2022 20:44)  HR: 67 (30 May 2022 14:14) (67 - 83)  BP: 147/55 (30 May 2022 14:14) (134/60 - 177/61)  BP(mean): --  RR: 17 (30 May 2022 14:14) (17 - 18)  SpO2: 93% (30 May 2022 14:14) (93% - 97%)    05-29 @ 07:01  -  05-30 @ 07:00  --------------------------------------------------------  IN: 315 mL / OUT: 101 mL / NET: 214 mL    05-30 @ 07:01  -  05-30 @ 16:49  --------------------------------------------------------  IN: 0 mL / OUT: 0 mL / NET: 0 mL      PHYSICAL EXAM:  Gen: well appearing, NAD  HEENT: NCAT, PEERLA b/l, EOMI b/l, no conjunctival erythema  Cardio: regular rate and rhythm, +s1s2, no murmurs, rubs, or gallops  Pulm: CTA b/l, no wheezes, rales or rhonchi  Abdomen: soft, nontender, nondistended, +BS x4 quadrants, no guarding  Extremities: no clubbing, cyanosis or edema, +2 pedal pulses, left hip dressing clean, dry and intact  Neuro: AAOx3, sensation intact  Skin: warm and dry      LABS:                        9.4    7.65  )-----------( 195      ( 30 May 2022 08:47 )             28.9     05-30    141  |  110<H>  |  45<H>  ----------------------------<  91  4.3   |  23  |  2.00<H>    Ca    7.7<L>      30 May 2022 08:47            RADIOLOGY & ADDITIONAL TESTS:

## 2022-05-30 NOTE — PROGRESS NOTE ADULT - PROBLEM SELECTOR PROBLEM 6
GIL (acute kidney injury)
GIL (acute kidney injury)
Leukocytosis
GIL (acute kidney injury)
Leukocytosis
Leukocytosis

## 2022-05-30 NOTE — PROGRESS NOTE ADULT - SUBJECTIVE AND OBJECTIVE BOX
Patient seen and examined at bedside. Pain is controlled. Reports feeling generally weak and tired.     Vital Signs Last 24 Hrs  T(C): 37 (30 May 2022 05:13), Max: 37.2 (29 May 2022 20:44)  T(F): 98.6 (30 May 2022 05:13), Max: 98.9 (29 May 2022 20:44)  HR: 77 (30 May 2022 05:28) (71 - 83)  BP: 154/57 (30 May 2022 05:28) (121/52 - 177/61)  BP(mean): --  RR: 18 (30 May 2022 05:13) (17 - 18)  SpO2: 96% (30 May 2022 05:13) (93% - 96%)      Exam:  Gen: NAD, resting comfortably  LLE  Compressive Dressing c/d/i  +EHL/FHL/TA/GS  SILT deep per/sup per/saph/sural   No palpable thigh hematoma or swelling  +DP  Calf NTTP b/l  Compartments soft and compressible    A/P:  89yFemale Stable POD5 L IMN    -Given 1U of prbc on 5/29 for a total of 3U postop; FOBT negative, LLE without swelling or palpable hematoma formation, dressing c/d/i; Continue to monitor H/h and transfuse as needed  -FU labs  -WBAT  -Pain control  -PT/OT  -DVT PE ppx- heparin and SCDs  -Incentive spirometry  -dispo REKHA  -Stable for discharge from orthopedics standpoint with fu in office, call for appointment

## 2022-05-30 NOTE — PROGRESS NOTE ADULT - SUBJECTIVE AND OBJECTIVE BOX
Patient is a 89y old  Female who presents with a chief complaint of Hip fracture (27 May 2022 11:12)       HPI:  89 y/o female w/ PMHx of HTN, HF (unknown EF), hypertension, depression and anxiety presents after having a fall earlier today. Patient chocked on something, was coughing,  went to bathroom and suddenly fell. She hit head in the frontal side, L elbow and hip. Had an episode of urinary incontinence after fall. Patient's son helped to get out from the floor, denies LOC. Patient denies presyncopal symptoms. Patient has multiple falls in the last couple of weeks due to unstable gait, however patient refuse to use walker per son, last fall was 3 days ago. Patent endorses poor appetite has 2 meals per day. Has constipation, not able to remember when was the last BM. She has chronic SIDHU.   At this time she denies headaches, dizziness, nausea, vomiting, chest pain, abdominal pain, fever, hematuria, melena, hematochezia or other symptoms.   Patient has been living with her son for the last 3 months.     Renal consulted for GIL/CKD. S/p OR. +Rubin. Poor appetite. H/o of CKD per family        No SOB/N/V    PAST MEDICAL & SURGICAL HISTORY:  Hypothyroid      HTN (hypertension)      HF (heart failure)      Anxiety and depression      S/P hysterectomy      FH: cholecystectomy           FAMILY HISTORY:  FH: heart disease (Father)    NC    Social History:Non smoker    MEDICATIONS  (STANDING):  amLODIPine   Tablet 5 milliGRAM(s) Oral daily  citalopram 40 milliGRAM(s) Oral daily  dextrose 5% + sodium chloride 0.45%. 1000 milliLiter(s) (75 mL/Hr) IV Continuous <Continuous>  heparin   Injectable 5000 Unit(s) SubCutaneous every 8 hours  levothyroxine 50 MICROGram(s) Oral daily  melatonin 3 milliGRAM(s) Oral at bedtime  metoprolol succinate ER 50 milliGRAM(s) Oral daily  pantoprazole    Tablet 40 milliGRAM(s) Oral before breakfast  polyethylene glycol 3350 17 Gram(s) Oral at bedtime  senna 2 Tablet(s) Oral at bedtime    MEDICATIONS  (PRN):  benzocaine 15 mG/menthol 3.6 mG Lozenge 1 Lozenge Oral four times a day PRN Sore Throat  ondansetron    Tablet 4 milliGRAM(s) Oral three times a day PRN Nausea and/or Vomiting  traMADol 100 milliGRAM(s) Oral every 6 hours PRN Moderate Pain (4 - 6)  traMADol 50 milliGRAM(s) Oral every 6 hours PRN Mild Pain (1 - 3)   Meds reviewed    Allergies    sulfa drugs (Nausea)    Intolerances    codeine (Nausea)       REVIEW OF SYSTEMS: As per HPI, otherwise negative     ICU Vital Signs Last 24 Hrs  T(C): 37 (30 May 2022 05:13), Max: 37.2 (29 May 2022 20:44)  T(F): 98.6 (30 May 2022 05:13), Max: 98.9 (29 May 2022 20:44)  HR: 69 (30 May 2022 10:01) (69 - 83)  BP: 137/53 (30 May 2022 10:01) (121/52 - 177/61)  BP(mean): --  ABP: --  ABP(mean): --  RR: 18 (30 May 2022 10:01) (17 - 18)  SpO2: 97% (30 May 2022 10:01) (93% - 97%)        PHYSICAL EXAM:  GENERAL: NAD  HEENT:  anicteric, moist mucous membranes  CHEST/LUNG:  CTA b/l, no rales, wheezes, or rhonchi  HEART:  RRR, S1, S2  ABDOMEN:  BS+, soft, nontender, nondistended  EXTREMITIES:  no edema, moves UE spont  NERVOUS SYSTEM: answers questions and follows commands appropriately      LABS:                                              9.4    7.65  )-----------( 195      ( 30 May 2022 08:47 )             28.9     05-30    141  |  110<H>  |  45<H>  ----------------------------<  91  4.3   |  23  |  2.00<H>    Ca    7.7<L>      30 May 2022 08:47

## 2022-05-30 NOTE — PROGRESS NOTE ADULT - PROBLEM SELECTOR PLAN 1
Patient presents s/p mechanical fall, now s/p L hip IMN on 5/25 with Dr. Winston  - CT hip showed comminuted, displaced fracture of the greater trochanter of the femur. There appears to be extension of the fracture into the intertrochanteric region of the left femur.  - GOC discussion with family, palliative team; pt will be DNR/DNI post-op  - Pain regimen: Tylenol 650 mg q6h PRN for mild pain, tramadol 25mg prn for moderate pain, 50mg for severe pain. Patient reports intolerance to codeine however tolerated tramadol in the er. Patient states that years ago she was given codeine and had an unknown reaction but denies anaphylaxis, states she just felt "as if something was not right".   - will monitor tolerance of tramadol closely and dc if patient reports reaction. Will attempt to use non opiod medications when possible after or.   - Bowel regimen: Senna and Miralax   - preoperative clearance provided, see previous progress note  - ortho following   - Cardio Dr. Downey's group consulted, recs appreciated  - PT consulted
Patient presents s/p mechanical fall.   - Admit to [facility]   - CT hip showed comminuted, displaced fracture of the greater trochanter of the femur. There appears to be extension of the fracture into the intertrochanteric region of the left femur.  - Ortho consulted in the ED, recommending OR.   - NPO, plan for OR today  - GOC discussion with family, palliative team; pt will be DNR/DNI post-op  - Pain regimen: Tylenol 650 mg q6h PRN for mild pain, tramadol 25mg prn for moderate pain, 50mg for severe pain. Patient reports intolerance to codeine however tolerated tramadol in the er. Patient states that years ago she was given codeine and had an unknown reaction but denies anaphylaxis, states she just felt "as if something was not right".   - will monitor tolerance of tramadol closely and dc if patient reports reaction. Will attempt to use non opiod medications when possible after or.   - Bowel regimen: Senna and Miralax   - Patient currently has no active cardiac conditions. No signs of ischemia, ADHF, clinical exam not consistent with stenotic valvular disease, no unstable arrhythmias noted. Baseline functional capacity is < 4 METS. RCRI score is 1. Patient is currently hemodynamically stable. Patient is medically optimized at this time and understands the inherent risks of surgery. Routine hemodynamic monitoring is suggested. Cardiology clearance needs to be obtained prior to or.   - Cardio Dr. Downey's group consulted for cardiac clearance  - PT consulted
Patient presents s/p mechanical fall, now s/p L hip IMN on 5/25 with Dr. Winston  - CT hip showed comminuted, displaced fracture of the greater trochanter of the femur. There appears to be extension of the fracture into the intertrochanteric region of the left femur.  - Ortho consulted in the ED  - GOC discussion with family, palliative team; pt will be DNR/DNI post-op  - Pain regimen: Tylenol 650 mg q6h PRN for mild pain, tramadol 25mg prn for moderate pain, 50mg for severe pain. Patient reports intolerance to codeine however tolerated tramadol in the er. Patient states that years ago she was given codeine and had an unknown reaction but denies anaphylaxis, states she just felt "as if something was not right".   - will monitor tolerance of tramadol closely and dc if patient reports reaction. Will attempt to use non opiod medications when possible after or.   - Bowel regimen: Senna and Miralax   - preoperative clearance provided, see previous progress note  - Cardio Dr. Downey's group consulted, recs appreciated  - PT consulted
Patient presents s/p mechanical fall, now s/p L hip IMN on 5/25 with Dr. Winston - POD#4  - CT hip showed comminuted, displaced fracture of the greater trochanter of the femur. There appears to be extension of the fracture into the intertrochanteric region of the left femur.  - GOC discussion with family, palliative team; pt will be DNR/DNI post-op  - Pain regimen:  tramadol 50mg prn for mild pain, tamadol 100mg for moderate pain  - Bowel regimen: Senna and Miralax   - WBAT  - encourage use of incentive spirometry  - ortho following   - Cardio Dr. Downey's group consulted, recs appreciated  - PT consulted - recommending RE[facility]KA
Patient presents s/p mechanical fall, now s/p L hip IMN on 5/25 with Dr. Winston - POD#3  - CT hip showed comminuted, displaced fracture of the greater trochanter of the femur. There appears to be extension of the fracture into the intertrochanteric region of the left femur.  - GOC discussion with family, palliative team; pt will be DNR/DNI post-op  - Pain regimen:  tramadol 50mg prn for mild pain, tamadol 100mg for moderate pain  - Bowel regimen: Senna and Miralax   - WBAT  - encourage use of incentive spirometry  - ortho following   - Cardio Dr. Downey's group consulted, recs appreciated  - PT consulted - recommending REK[facility]
Patient presents s/p mechanical fall, now s/p L hip IMN on 5/25 with Dr. Winston - POD#3  - CT hip showed comminuted, displaced fracture of the greater trochanter of the femur. There appears to be extension of the fracture into the intertrochanteric region of the left femur.  - GOC discussion with family, palliative team; pt will be DNR/DNI post-op  - Pain regimen:  tramadol 50mg prn for mild pain, tamadol 100mg for moderate pain  - Bowel regimen: Senna and Miralax   - WBAT  - encourage use of incentive spirometry  - ortho following   - Cardio Dr. Downey's group consulted, recs appreciated  - PT consulted - recommending REKHA

## 2022-05-30 NOTE — PROGRESS NOTE ADULT - PROBLEM SELECTOR PLAN 7
Chronic, EF 60%  - asymptomatic w/ no signs & symptoms of fluid overload  - Elevated Pro BNP 2743  - EKG on admission shows NSR at 68 bpm   - TTE this admission shows Normal left ventricular internal dimensions and systolic function, estimated LVEF of 60%.  Grossly normal RV size and systolic function. Calcified trileaflet aortic valve with moderate aortic stenosis, moderate   AI. Mild mitral stenosis with mild to moderate mitral regurgitation. No significant pericardial effusion.  - Continue home Toprol 50 mg qd   - hold home lasix in setting of brenda, can restart if cr remains stable or if signs of volume overload   - O2 to maintain O2 Sat > 92%  - strict I&Os & daily weights

## 2022-05-30 NOTE — PROGRESS NOTE ADULT - PROBLEM SELECTOR PROBLEM 3
Leukocytosis
Leukocytosis
IGL (acute kidney injury)
GIL (acute kidney injury)
GIL (acute kidney injury)
Leukocytosis

## 2022-05-30 NOTE — PROGRESS NOTE ADULT - ASSESSMENT
89 y/o female w/ PMH of HTN, HF (unknown EF), hypertension, depression and anxiety presents after having a fall yesterday, cardio consult requested for cardiac clearance for surgery.    s/p Fall, Cardiac Optimization, HTN, HF  - Fall appeared to be mechanical in nature.    - Now s/p Left hip IMN.  Tolerated well with no obvious cardiac complications  - Continue pain control management    - ECHO showed normal LV & RV size and function EF 50%, mod AS, mod AI, mild MS, mild to mod MR  - No evidence of any meaningful volume overload   - No evidence of cardiac arrhythmias, now off tele.   - No clear evidence of acute ischemia     - BP and HR well controlled  - Continue home Amlodipine and Toprol XL    - Continue to hold home Lasix;  Okay with gentle hydration.      - Monitor and replete lytes, keep K>4, Mg>2.  - Will continue to follow.    Jenn Barreto MS FNP, AGAP  Nurse Practitioner- Cardiology   [id number] /[phone]

## 2022-05-30 NOTE — PROGRESS NOTE ADULT - PROBLEM SELECTOR PLAN 9
Chronic   - TSH 9.2, T3 1.12 (low) and t4 1.4   - On synthroid 50 mcg qd, will increase to 75mcg daily, repeat TFTs in 4-6 weeks as outpatient

## 2022-05-30 NOTE — PROGRESS NOTE ADULT - PROBLEM SELECTOR PLAN 10
heparin subq 5000 q8hrs  IMPROVE VTE Individual Risk Assessment  RISK                                                                Points  [  ] Previous VTE                                                  3  [  ] Thrombophilia                                               2  [  ] Lower limb paralysis                                      2        (unable to hold up >15 seconds)    [  ] Current Cancer                                              2         (within 6 months)  [  ] Immobilization > 24 hrs                                1  [  ] ICU/CCU stay > 24 hours                              1  [ x ] Age > 60                                                      1  IMPROVE VTE Score ______1___
SCDs for possible or in am   IMPROVE VTE Individual Risk Assessment  RISK                                                                Points  [  ] Previous VTE                                                  3  [  ] Thrombophilia                                               2  [  ] Lower limb paralysis                                      2        (unable to hold up >15 seconds)    [  ] Current Cancer                                              2         (within 6 months)  [  ] Immobilization > 24 hrs                                1  [  ] ICU/CCU stay > 24 hours                              1  [ x ] Age > 60                                                      1  IMPROVE VTE Score ______1___
heparin subq 5000 q8hrs  IMPROVE VTE Individual Risk Assessment  RISK                                                                Points  [  ] Previous VTE                                                  3  [  ] Thrombophilia                                               2  [  ] Lower limb paralysis                                      2        (unable to hold up >15 seconds)    [  ] Current Cancer                                              2         (within 6 months)  [  ] Immobilization > 24 hrs                                1  [  ] ICU/CCU stay > 24 hours                              1  [ x ] Age > 60                                                      1  IMPROVE VTE Score ______1___

## 2022-05-30 NOTE — PROGRESS NOTE ADULT - NS ATTEND AMEND GEN_ALL_CORE FT
Chart reviewed    Patient seen and examined    Agree with plan as outlined above    89 y/o female w/ PMHx of HTN, HF (unknown EF), hypertension, depression and anxiety presents after having a fall yesterday, cardio consult requested for cardiac clearance for surgery.    s/p Fall, Cardiac Optimization  - Fall appeared to be mechanical in nature.  Now s/p Left hip IMN.  Tolerated well with no obvious cardiac complications  - Pain control  - Encourage incentive spirometer  - No evidence of cardiac arrhythmias on tele.  Can D/C  - No clear evidence of acute ischemia or volume overload  - BP well controlled, monitor routine hemodynamics.  - Continue home Amlodipine and Toprol XL  - Continue to hold home Lasix; renal function worsening.  Okay with gentle hydration.  Recommend Renal eval  - Monitor and replete lytes, keep K>4, Mg>2.
Stable post op with no evidence of any cardiac complications. To follow closely while admitted.
Pt seen and examined, agree with above
Pt seen and examined, agree with above

## 2022-05-30 NOTE — PROGRESS NOTE ADULT - PROBLEM SELECTOR PLAN 8
Chronic, non active issues   - Continue home citalopram 40 mg qd

## 2022-05-30 NOTE — PROGRESS NOTE ADULT - PROBLEM SELECTOR PROBLEM 1
Hip fracture, left

## 2022-05-30 NOTE — PROGRESS NOTE ADULT - PROBLEM SELECTOR PLAN 3
GIL (on CKD) likely 2/2 pre-renal azotemia in the setting of dehydration,  poor PO intake  - Creatinine 1.9 on admission - then worsening post op likely multifactorial 2/2 hypotension and anemia  -Cr. improving this AM 3.50 -> 3.10 ->2.40 -> 2.0  - ramey removed, passed TOV  - Monitor BMP  - Avoid nephrotoxic medications  - Monitor renal indices

## 2022-05-30 NOTE — PROGRESS NOTE ADULT - PROBLEM SELECTOR PROBLEM 7
HF (heart failure)

## 2022-05-31 VITALS
HEART RATE: 67 BPM | RESPIRATION RATE: 18 BRPM | DIASTOLIC BLOOD PRESSURE: 56 MMHG | SYSTOLIC BLOOD PRESSURE: 133 MMHG | TEMPERATURE: 98 F

## 2022-05-31 LAB
ANION GAP SERPL CALC-SCNC: 6 MMOL/L — SIGNIFICANT CHANGE UP (ref 5–17)
BUN SERPL-MCNC: 43 MG/DL — HIGH (ref 7–23)
CALCIUM SERPL-MCNC: 8.3 MG/DL — LOW (ref 8.5–10.1)
CHLORIDE SERPL-SCNC: 111 MMOL/L — HIGH (ref 96–108)
CO2 SERPL-SCNC: 24 MMOL/L — SIGNIFICANT CHANGE UP (ref 22–31)
CREAT SERPL-MCNC: 1.6 MG/DL — HIGH (ref 0.5–1.3)
EGFR: 30 ML/MIN/1.73M2 — LOW
GLUCOSE SERPL-MCNC: 79 MG/DL — SIGNIFICANT CHANGE UP (ref 70–99)
HCT VFR BLD CALC: 30.3 % — LOW (ref 34.5–45)
HGB BLD-MCNC: 9.5 G/DL — LOW (ref 11.5–15.5)
MCHC RBC-ENTMCNC: 29.5 PG — SIGNIFICANT CHANGE UP (ref 27–34)
MCHC RBC-ENTMCNC: 31.4 GM/DL — LOW (ref 32–36)
MCV RBC AUTO: 94.1 FL — SIGNIFICANT CHANGE UP (ref 80–100)
NRBC # BLD: 0 /100 WBCS — SIGNIFICANT CHANGE UP (ref 0–0)
PLATELET # BLD AUTO: 206 K/UL — SIGNIFICANT CHANGE UP (ref 150–400)
POTASSIUM SERPL-MCNC: 4.4 MMOL/L — SIGNIFICANT CHANGE UP (ref 3.5–5.3)
POTASSIUM SERPL-SCNC: 4.4 MMOL/L — SIGNIFICANT CHANGE UP (ref 3.5–5.3)
RBC # BLD: 3.22 M/UL — LOW (ref 3.8–5.2)
RBC # FLD: 15.3 % — HIGH (ref 10.3–14.5)
SODIUM SERPL-SCNC: 141 MMOL/L — SIGNIFICANT CHANGE UP (ref 135–145)
WBC # BLD: 8.46 K/UL — SIGNIFICANT CHANGE UP (ref 3.8–10.5)
WBC # FLD AUTO: 8.46 K/UL — SIGNIFICANT CHANGE UP (ref 3.8–10.5)

## 2022-05-31 PROCEDURE — 86850 RBC ANTIBODY SCREEN: CPT

## 2022-05-31 PROCEDURE — 84133 ASSAY OF URINE POTASSIUM: CPT

## 2022-05-31 PROCEDURE — 93005 ELECTROCARDIOGRAM TRACING: CPT

## 2022-05-31 PROCEDURE — 83540 ASSAY OF IRON: CPT

## 2022-05-31 PROCEDURE — 99285 EMERGENCY DEPT VISIT HI MDM: CPT

## 2022-05-31 PROCEDURE — 84481 FREE ASSAY (FT-3): CPT

## 2022-05-31 PROCEDURE — 80053 COMPREHEN METABOLIC PANEL: CPT

## 2022-05-31 PROCEDURE — 84100 ASSAY OF PHOSPHORUS: CPT

## 2022-05-31 PROCEDURE — 81001 URINALYSIS AUTO W/SCOPE: CPT

## 2022-05-31 PROCEDURE — 82607 VITAMIN B-12: CPT

## 2022-05-31 PROCEDURE — 83935 ASSAY OF URINE OSMOLALITY: CPT

## 2022-05-31 PROCEDURE — 85730 THROMBOPLASTIN TIME PARTIAL: CPT

## 2022-05-31 PROCEDURE — 87635 SARS-COV-2 COVID-19 AMP PRB: CPT

## 2022-05-31 PROCEDURE — 82746 ASSAY OF FOLIC ACID SERUM: CPT

## 2022-05-31 PROCEDURE — 76376 3D RENDER W/INTRP POSTPROCES: CPT

## 2022-05-31 PROCEDURE — 83550 IRON BINDING TEST: CPT

## 2022-05-31 PROCEDURE — 85014 HEMATOCRIT: CPT

## 2022-05-31 PROCEDURE — 73080 X-RAY EXAM OF ELBOW: CPT

## 2022-05-31 PROCEDURE — 97535 SELF CARE MNGMENT TRAINING: CPT

## 2022-05-31 PROCEDURE — 97161 PT EVAL LOW COMPLEX 20 MIN: CPT

## 2022-05-31 PROCEDURE — 97166 OT EVAL MOD COMPLEX 45 MIN: CPT

## 2022-05-31 PROCEDURE — 93306 TTE W/DOPPLER COMPLETE: CPT

## 2022-05-31 PROCEDURE — 86901 BLOOD TYPING SEROLOGIC RH(D): CPT

## 2022-05-31 PROCEDURE — 73700 CT LOWER EXTREMITY W/O DYE: CPT | Mod: MA

## 2022-05-31 PROCEDURE — 84466 ASSAY OF TRANSFERRIN: CPT

## 2022-05-31 PROCEDURE — 76000 FLUOROSCOPY <1 HR PHYS/QHP: CPT

## 2022-05-31 PROCEDURE — 83735 ASSAY OF MAGNESIUM: CPT

## 2022-05-31 PROCEDURE — 82272 OCCULT BLD FECES 1-3 TESTS: CPT

## 2022-05-31 PROCEDURE — 85610 PROTHROMBIN TIME: CPT

## 2022-05-31 PROCEDURE — 97110 THERAPEUTIC EXERCISES: CPT

## 2022-05-31 PROCEDURE — 84439 ASSAY OF FREE THYROXINE: CPT

## 2022-05-31 PROCEDURE — C8929: CPT

## 2022-05-31 PROCEDURE — 72125 CT NECK SPINE W/O DYE: CPT | Mod: MA

## 2022-05-31 PROCEDURE — 71045 X-RAY EXAM CHEST 1 VIEW: CPT

## 2022-05-31 PROCEDURE — 84550 ASSAY OF BLOOD/URIC ACID: CPT

## 2022-05-31 PROCEDURE — 97116 GAIT TRAINING THERAPY: CPT

## 2022-05-31 PROCEDURE — 84540 ASSAY OF URINE/UREA-N: CPT

## 2022-05-31 PROCEDURE — 83880 ASSAY OF NATRIURETIC PEPTIDE: CPT

## 2022-05-31 PROCEDURE — 82570 ASSAY OF URINE CREATININE: CPT

## 2022-05-31 PROCEDURE — 86900 BLOOD TYPING SEROLOGIC ABO: CPT

## 2022-05-31 PROCEDURE — P9016: CPT

## 2022-05-31 PROCEDURE — 96374 THER/PROPH/DIAG INJ IV PUSH: CPT

## 2022-05-31 PROCEDURE — 86923 COMPATIBILITY TEST ELECTRIC: CPT

## 2022-05-31 PROCEDURE — 82550 ASSAY OF CK (CPK): CPT

## 2022-05-31 PROCEDURE — 84484 ASSAY OF TROPONIN QUANT: CPT

## 2022-05-31 PROCEDURE — C1713: CPT

## 2022-05-31 PROCEDURE — 73552 X-RAY EXAM OF FEMUR 2/>: CPT

## 2022-05-31 PROCEDURE — 99239 HOSP IP/OBS DSCHRG MGMT >30: CPT

## 2022-05-31 PROCEDURE — 82728 ASSAY OF FERRITIN: CPT

## 2022-05-31 PROCEDURE — 85018 HEMOGLOBIN: CPT

## 2022-05-31 PROCEDURE — 97112 NEUROMUSCULAR REEDUCATION: CPT

## 2022-05-31 PROCEDURE — 70450 CT HEAD/BRAIN W/O DYE: CPT | Mod: MA

## 2022-05-31 PROCEDURE — 36415 COLL VENOUS BLD VENIPUNCTURE: CPT

## 2022-05-31 PROCEDURE — 84443 ASSAY THYROID STIM HORMONE: CPT

## 2022-05-31 PROCEDURE — 80048 BASIC METABOLIC PNL TOTAL CA: CPT

## 2022-05-31 PROCEDURE — 87205 SMEAR GRAM STAIN: CPT

## 2022-05-31 PROCEDURE — 84300 ASSAY OF URINE SODIUM: CPT

## 2022-05-31 PROCEDURE — C1776: CPT

## 2022-05-31 PROCEDURE — 82553 CREATINE MB FRACTION: CPT

## 2022-05-31 PROCEDURE — 99232 SBSQ HOSP IP/OBS MODERATE 35: CPT

## 2022-05-31 PROCEDURE — 84156 ASSAY OF PROTEIN URINE: CPT

## 2022-05-31 PROCEDURE — 85025 COMPLETE CBC W/AUTO DIFF WBC: CPT

## 2022-05-31 PROCEDURE — 36430 TRANSFUSION BLD/BLD COMPNT: CPT

## 2022-05-31 PROCEDURE — 85027 COMPLETE CBC AUTOMATED: CPT

## 2022-05-31 PROCEDURE — 97530 THERAPEUTIC ACTIVITIES: CPT

## 2022-05-31 PROCEDURE — 73502 X-RAY EXAM HIP UNI 2-3 VIEWS: CPT

## 2022-05-31 RX ORDER — MULTIVIT-MIN/FERROUS GLUCONATE 9 MG/15 ML
1 LIQUID (ML) ORAL
Qty: 0 | Refills: 0 | DISCHARGE
Start: 2022-05-31

## 2022-05-31 RX ORDER — TRAMADOL HYDROCHLORIDE 50 MG/1
1 TABLET ORAL
Qty: 0 | Refills: 0 | DISCHARGE
Start: 2022-05-31

## 2022-05-31 RX ORDER — TRAMADOL HYDROCHLORIDE 50 MG/1
2 TABLET ORAL
Qty: 0 | Refills: 0 | DISCHARGE
Start: 2022-05-31

## 2022-05-31 RX ORDER — FUROSEMIDE 40 MG
1 TABLET ORAL
Qty: 0 | Refills: 0 | DISCHARGE

## 2022-05-31 RX ORDER — LEVOTHYROXINE SODIUM 125 MCG
1 TABLET ORAL
Qty: 0 | Refills: 0 | DISCHARGE
Start: 2022-05-31

## 2022-05-31 RX ORDER — LEVOTHYROXINE SODIUM 125 MCG
1 TABLET ORAL
Qty: 0 | Refills: 0 | DISCHARGE

## 2022-05-31 RX ADMIN — PANTOPRAZOLE SODIUM 40 MILLIGRAM(S): 20 TABLET, DELAYED RELEASE ORAL at 05:58

## 2022-05-31 RX ADMIN — AMLODIPINE BESYLATE 5 MILLIGRAM(S): 2.5 TABLET ORAL at 05:58

## 2022-05-31 RX ADMIN — Medication 75 MICROGRAM(S): at 05:58

## 2022-05-31 RX ADMIN — HEPARIN SODIUM 5000 UNIT(S): 5000 INJECTION INTRAVENOUS; SUBCUTANEOUS at 14:05

## 2022-05-31 RX ADMIN — Medication 50 MILLIGRAM(S): at 05:58

## 2022-05-31 RX ADMIN — CITALOPRAM 40 MILLIGRAM(S): 10 TABLET, FILM COATED ORAL at 11:10

## 2022-05-31 RX ADMIN — HEPARIN SODIUM 5000 UNIT(S): 5000 INJECTION INTRAVENOUS; SUBCUTANEOUS at 06:00

## 2022-05-31 NOTE — PROGRESS NOTE ADULT - SUBJECTIVE AND OBJECTIVE BOX
[facility] -- Donnell Downey, Tom, Sanket, Tunde Gracia Savella  Office # [phone]      Follow Up:    cardiac optimization   Subjective/Observations:     No events overnight resting comfortably in bed.  No complaints of chest pain, dyspnea, or palpitations reported. No signs of orthopnea or PND.    REVIEW OF SYSTEMS: All other review of systems is negative unless indicated above    PAST MEDICAL & SURGICAL HISTORY:  Hypothyroid      HTN (hypertension)      HF (heart failure)      Anxiety and depression      S/P hysterectomy      FH: cholecystectomy          MEDICATIONS  (STANDING):  amLODIPine   Tablet 5 milliGRAM(s) Oral daily  citalopram 40 milliGRAM(s) Oral daily  heparin   Injectable 5000 Unit(s) SubCutaneous every 8 hours  levothyroxine 75 MICROGram(s) Oral daily  melatonin 3 milliGRAM(s) Oral at bedtime  metoprolol succinate ER 50 milliGRAM(s) Oral daily  pantoprazole    Tablet 40 milliGRAM(s) Oral before breakfast  polyethylene glycol 3350 17 Gram(s) Oral at bedtime  senna 2 Tablet(s) Oral at bedtime    MEDICATIONS  (PRN):  benzocaine 15 mG/menthol 3.6 mG Lozenge 1 Lozenge Oral four times a day PRN Sore Throat  ondansetron    Tablet 4 milliGRAM(s) Oral three times a day PRN Nausea and/or Vomiting  traMADol 100 milliGRAM(s) Oral every 6 hours PRN Moderate Pain (4 - 6)  traMADol 50 milliGRAM(s) Oral every 6 hours PRN Mild Pain (1 - 3)      Allergies    sulfa drugs (Nausea)    Intolerances    codeine (Nausea)      Vital Signs Last 24 Hrs  T(C): 36.9 (31 May 2022 04:49), Max: 36.9 (31 May 2022 04:49)  T(F): 98.5 (31 May 2022 04:49), Max: 98.5 (31 May 2022 04:49)  HR: 68 (31 May 2022 04:49) (67 - 69)  BP: 160/67 (31 May 2022 04:49) (133/74 - 160/67)  BP(mean): --  RR: 17 (31 May 2022 04:49) (17 - 18)  SpO2: 93% (31 May 2022 04:49) (93% - 97%)    I&O's Summary    30 May 2022 07:01  -  31 May 2022 07:00  --------------------------------------------------------  IN: 0 mL / OUT: 0 mL / NET: 0 mL          PHYSICAL EXAM:  TELE: not on tele   Constitutional: NAD, awake and alert,frail   HEENT: Moist Mucous Membranes, Anicteric  Pulmonary: Non-labored, breath sounds are clear bilaterally, No wheezing, crackles or rhonchi  Cardiovascular: Regular, S1 and S2 nl, murmur   Gastrointestinal: Bowel Sounds present, soft, nontender.   Lymph: No lymphadenopathy. No peripheral edema.  Skin: left hip with dressing cdi  Psych:  Mood & affect appropriate    LABS: All Labs Reviewed:                        9.4    7.65  )-----------( 195      ( 30 May 2022 08:47 )             28.9                         10.0   x     )-----------( x        ( 29 May 2022 18:32 )             30.0                         8.3    7.59  )-----------( 176      ( 29 May 2022 08:57 )             25.8     30 May 2022 08:47    141    |  110    |  45     ----------------------------<  91     4.3     |  23     |  2.00   29 May 2022 08:57    136    |  102    |  58     ----------------------------<  124    4.0     |  24     |  2.40   28 May 2022 08:35    135    |  101    |  66     ----------------------------<  100    4.8     |  24     |  3.10     Ca    7.7        30 May 2022 08:47  Ca    7.4        29 May 2022 08:57  Ca    7.3        28 May 2022 08:35  Phos  4.5       28 May 2022 08:35  Mg     2.3       28 May 2022 08:35    TPro  6.0    /  Alb  2.5    /  TBili  0.3    /  DBili  x      /  AST  26     /  ALT  7      /  AlkPhos  70     28 May 2022 08:35        12 Lead ECG:   Ventricular Rate 68 BPM    Atrial Rate 68 BPM    P-R Interval 150 ms    QRS Duration 74 ms    Q-T Interval 456 ms    QTC Calculation(Bazett) 484 ms    P Axis 27 degrees    R Axis 21 degrees    T Axis 53 degrees    Diagnosis Line Normal sinus rhythm  Normal ECG  No previous ECGs available  Confirmed by Sanket SCANLON, Louis ([id number]) on 5/25/2022 1:26:06 PM (05-24-22 @ 22:01)      ACC: [id number] EXAM:  ECHO TTE WO CON COMP W DOPP                          PROCEDURE DATE:  05/25/2022          INTERPRETATION:  INDICATION: Atrial fibrillation  Sonographer     Blood Pressure 109/48    Height 152 cm     Weight 30.8 kg       BSA 1.18   sq m    Dimensions:  LA 2.3       Normal Values: 2.0 - 4.0 cm  Ao 2.9        Normal Values: 2.0 - 3.8 cm  SEPTUM 1.2       Normal Values: 0.6 - 1.2 cm  PWT 0.8       Normal Values: 0.6 - 1.1 cm  LVIDd 3.7         Normal Values: 3.0 - 5.6 cm  LVIDs 2.9         Normal Values: 1.8 - 4.0 cm      OBSERVATIONS:  Mitral Valve: Mitral annular calcification with calcified leaflets. Mean   transmitral valve gradient equals 4 mmHg at a heart rate of 63 beats from   minute. This is consistent with mild mitral stenosis.  Mild to moderate MR  Aortic Valve/Aorta: Calcified trileaflet aortic valve with decreased   opening. Peak transaortic valve gradient is 23 mmHg with a mean   transaortic valve gradient 13.9 mmHg. The aortic valve area is calculated   to be 1.2 sq cm by continuity equation. This is consistent with moderate   aortic stenosis.  Moderate AI  Tricuspid Valve: normal with trace TR.  Pulmonic Valve: Mild PI  Left Atrium: normal  Right Atrium: Not well-visualized  Left Ventricle: normal LV size and systolic function, estimated LVEF of   60%.  Right Ventricle: Grossly normal size and systolic function.  Pericardium: no significant pericardial effusion.    IMPRESSION:  Normal left ventricular internal dimensions and systolic function,   estimated LVEF of 60%.  Grossly normal RV size and systolic function.  Calcified trileaflet aortic valve with moderate aortic stenosis, moderate   AI.  Mild mitral stenosis with mild to moderate mitral regurgitation  No significant pericardialeffusion.

## 2022-05-31 NOTE — PROGRESS NOTE ADULT - NS PANP COMMENT GEN_ALL_CORE FT
doing better from cv standpoint  brenda improving and will get her back on diuretics at some point  dc planning per primary team

## 2022-05-31 NOTE — PROGRESS NOTE ADULT - ASSESSMENT
GIL/CKD  Hip fracture s/p IMN  HTN  Anemia  Cardiomyopathy     -CKDIII-IV with baseline Cr around 2  -GIL with post-op ATN  -Keep Rubin  -Urine studies reviewed; FENA < 1%  -Hold diuretics  -ATN will have to run its course. No indication for RRT at this time. Creatinine improving; will monitor trend. Repeat labs pending  -Avoid nephrotoxins. Keep MAP>65  -Rubin removed      Thank you for involving nephrology for the care of this patient

## 2022-05-31 NOTE — PROGRESS NOTE ADULT - REASON FOR ADMISSION
Hip fracture

## 2022-05-31 NOTE — PROGRESS NOTE ADULT - ASSESSMENT
89 y/o female w/ PMH of HTN, HF (unknown EF), hypertension, depression and anxiety presents after having a fall yesterday, cardio consult requested for cardiac clearance for surgery.    s/p Fall, Cardiac Optimization, HTN, HF  - Fall appeared to be mechanical in nature.    - Now s/p Left hip IMN.  Tolerated well with no obvious cardiac complication  - ECHO showed normal LV & RV size and function EF 50%, mod AS, mod AI, mild MS, mild to mod MR  - No evidence of any meaningful volume overload   - BP and HR well controlled  - Continue home Amlodipine and Toprol XL- repeat bp after am medication administration   -lasix held for GIL . fu am labs   -anemia post op sp PRBC as per primary   - Monitor and replete lytes, keep K>4, Mg>2.  - Will continue to follow.  Earlene Mancera FNP-C  Cardiology NP  [id number] [phone]   89 y/o female w/ PMH of HTN, HF (unknown EF), hypertension, depression and anxiety presents after having a fall yesterday, cardio consult requested for cardiac clearance for surgery.    s/p Fall, Cardiac Optimization, HTN, HF  - Fall appeared to be mechanical in nature.    - Now s/p Left hip IMN.  Tolerated well with no obvious cardiac complication  - ECHO showed normal LV & RV size and function EF 50%, mod AS, mod AI, mild MS, mild to mod MR  - No evidence of any meaningful volume overload   - BP and HR well controlled  - Continue home Amlodipine and Toprol XL- repeat bp after am medication administration   - lasix held for GIL . fu am labs   - anemia post op sp PRBC as per primary   - Monitor and replete lytes, keep K>4, Mg>2.  - Will continue to follow.    Earlene Mancera FNP-C  Cardiology NP  [id number] [phone]

## 2022-05-31 NOTE — PROGRESS NOTE ADULT - PROVIDER SPECIALTY LIST ADULT
Cardiology
Nephrology
Orthopedics
Hospitalist
Nephrology
Orthopedics
Anesthesia
Cardiology
Cardiology
Gastroenterology
Nephrology
Orthopedics
Orthopedics
Cardiology
Hospitalist

## 2022-05-31 NOTE — PROGRESS NOTE ADULT - SUBJECTIVE AND OBJECTIVE BOX
Patient seen and examined at bedside. States she is feeling well, complains of mild pain/soreness in L hip. Denies any chest pain, SOB, abd pain. Denies any blood in stool. Hgb stable, Cr improving, discussed with Nephro, can restart home lasix starting this Friday (6/4/22). Patient to be discharged to [facility] today.    Physical Exam:  Vital Signs Last 24 Hrs  T(C): 36.7 (31 May 2022 13:46), Max: 36.9 (31 May 2022 04:49)  T(F): 98 (31 May 2022 13:46), Max: 98.5 (31 May 2022 04:49)  HR: 67 (31 May 2022 13:46) (67 - 68)  BP: 133/56 (31 May 2022 13:46) (133/56 - 160/67)  BP(mean): --  RR: 18 (31 May 2022 13:46) (17 - 18)  SpO2: 93% (31 May 2022 04:49) (93% - 93%)    Gen: cachectic, NAD  HEENT: NCAT, PEERLA b/l, EOMI b/l, no conjunctival erythema  Cardio: regular rate and rhythm, +s1s2, no murmurs, rubs, or gallops  Pulm: CTA b/l, no wheezes, rales or rhonchi  Abdomen: soft, nontender, nondistended, +BS x4 quadrants, no guarding  Extremities: no clubbing, cyanosis or edema, +2 pedal pulses, left hip dressing clean, dry and intact  Neuro: AAOx3, sensation intact  Skin: warm and dry    Please refer to updated D/C note for further details.

## 2022-05-31 NOTE — PROGRESS NOTE ADULT - NUTRITIONAL ASSESSMENT
This patient has been assessed with a concern for Malnutrition and has been determined to have a diagnosis/diagnoses of Severe protein-calorie malnutrition and Underweight (BMI < 19).    This patient is being managed with:   Diet Regular-  No Concentrated Potassium  Supplement Feeding Modality:  Oral  Ensure Clear Cans or Servings Per Day:  1       Frequency:  Three Times a day  Entered: May 26 2022 10:41AM    
This patient has been assessed with a concern for Malnutrition and has been determined to have a diagnosis/diagnoses of Severe protein-calorie malnutrition and Underweight (BMI < 19).    This patient is being managed with:   Diet Regular-  No Concentrated Potassium  Supplement Feeding Modality:  Oral  Ensure Clear Cans or Servings Per Day:  1       Frequency:  Three Times a day  Entered: May 26 2022 10:41AM    Diet Regular-  Entered: May 26 2022  7:55AM    The following pending diet order is being considered for treatment of Severe protein-calorie malnutrition and Underweight (BMI < 19):null
This patient has been assessed with a concern for Malnutrition and has been determined to have a diagnosis/diagnoses of Severe protein-calorie malnutrition and Underweight (BMI < 19).    This patient is being managed with:   Diet Regular-  No Concentrated Potassium  Supplement Feeding Modality:  Oral  Ensure Clear Cans or Servings Per Day:  1       Frequency:  Three Times a day  Entered: May 26 2022 10:41AM    

## 2022-05-31 NOTE — PROGRESS NOTE ADULT - SUBJECTIVE AND OBJECTIVE BOX
[facility]  Rafael Jung PA-C  [facility] [place] [address]  [phone]      INTERVAL HPI/OVERNIGHT EVENTS:  Pt s/e  Pt reports +BM yesterday, no symptoms of GI bleeding  Denies further GI complaints    MEDICATIONS  (STANDING):  amLODIPine   Tablet 5 milliGRAM(s) Oral daily  citalopram 40 milliGRAM(s) Oral daily  heparin   Injectable 5000 Unit(s) SubCutaneous every 8 hours  levothyroxine 75 MICROGram(s) Oral daily  melatonin 3 milliGRAM(s) Oral at bedtime  metoprolol succinate ER 50 milliGRAM(s) Oral daily  pantoprazole    Tablet 40 milliGRAM(s) Oral before breakfast  polyethylene glycol 3350 17 Gram(s) Oral at bedtime  senna 2 Tablet(s) Oral at bedtime    MEDICATIONS  (PRN):  benzocaine 15 mG/menthol 3.6 mG Lozenge 1 Lozenge Oral four times a day PRN Sore Throat  ondansetron    Tablet 4 milliGRAM(s) Oral three times a day PRN Nausea and/or Vomiting  traMADol 100 milliGRAM(s) Oral every 6 hours PRN Moderate Pain (4 - 6)  traMADol 50 milliGRAM(s) Oral every 6 hours PRN Mild Pain (1 - 3)      Allergies    sulfa drugs (Nausea)    Intolerances    codeine (Nausea)      PHYSICAL EXAM:   Vital Signs:  Vital Signs Last 24 Hrs  T(C): 36.9 (31 May 2022 04:49), Max: 36.9 (31 May 2022 04:49)  T(F): 98.5 (31 May 2022 04:49), Max: 98.5 (31 May 2022 04:49)  HR: 68 (31 May 2022 04:49) (67 - 68)  BP: 160/67 (31 May 2022 04:49) (133/74 - 160/67)  BP(mean): --  RR: 17 (31 May 2022 04:49) (17 - 18)  SpO2: 93% (31 May 2022 04:49) (93% - 94%)  Daily     Daily     GENERAL:  Appears stated age  ABDOMEN:  Soft, non-tender, non-distended  NEURO:  Alert      LABS:                        9.5    8.46  )-----------( 206      ( 31 May 2022 09:57 )             30.3     05-31    141  |  111<H>  |  43<H>  ----------------------------<  79  4.4   |  24  |  1.60<H>    Ca    8.3<L>      31 May 2022 09:57

## 2022-05-31 NOTE — PROGRESS NOTE ADULT - ASSESSMENT
89 year old female with mechanical fall   Anemia  FOBT negative    2/2 to hematoma and fall   No overt GI bleeding  Transfuse to a goal hemoglobin near 8   No gi intervention   Fall precautions   Ortho follow up     I reviewed the overnight course of events on the unit, re-confirming the patient history. I discussed the care with the patient and their family  Differential diagnosis and plan of care discussed with patient after the evaluation  35 minutes spent on total encounter of which more than fifty percent of the encounter was spent counseling and/or coordinating care by the attending physician.  Advanced care planning was discussed with patient and family.  Advanced care planning forms were reviewed and discussed.  Risks, benefits and alternatives of gastroenterologic procedures were discussed in detail and all questions were answered.

## 2022-06-04 RX ORDER — FUROSEMIDE 40 MG
1 TABLET ORAL
Qty: 0 | Refills: 0 | DISCHARGE
Start: 2022-06-04

## 2023-01-01 ENCOUNTER — INPATIENT (INPATIENT)
Facility: HOSPITAL | Age: 88
LOS: 2 days | Discharge: NURSING, REHAB OR LONG-TERM CARE | DRG: 536 | End: 2023-07-26
Attending: FAMILY MEDICINE | Admitting: STUDENT IN AN ORGANIZED HEALTH CARE EDUCATION/TRAINING PROGRAM
Payer: MEDICARE

## 2023-01-01 ENCOUNTER — TRANSCRIPTION ENCOUNTER (OUTPATIENT)
Age: 88
End: 2023-01-01

## 2023-01-01 VITALS
HEART RATE: 61 BPM | DIASTOLIC BLOOD PRESSURE: 48 MMHG | SYSTOLIC BLOOD PRESSURE: 166 MMHG | OXYGEN SATURATION: 96 % | WEIGHT: 95.02 LBS | RESPIRATION RATE: 18 BRPM | TEMPERATURE: 98 F

## 2023-01-01 VITALS
TEMPERATURE: 98 F | RESPIRATION RATE: 18 BRPM | DIASTOLIC BLOOD PRESSURE: 56 MMHG | OXYGEN SATURATION: 85 % | HEART RATE: 78 BPM | SYSTOLIC BLOOD PRESSURE: 154 MMHG

## 2023-01-01 DIAGNOSIS — I10 ESSENTIAL (PRIMARY) HYPERTENSION: ICD-10-CM

## 2023-01-01 DIAGNOSIS — Z83.79 FAMILY HISTORY OF OTHER DISEASES OF THE DIGESTIVE SYSTEM: Chronic | ICD-10-CM

## 2023-01-01 DIAGNOSIS — S72.009A FRACTURE OF UNSPECIFIED PART OF NECK OF UNSPECIFIED FEMUR, INITIAL ENCOUNTER FOR CLOSED FRACTURE: ICD-10-CM

## 2023-01-01 DIAGNOSIS — S72.001A FRACTURE OF UNSPECIFIED PART OF NECK OF RIGHT FEMUR, INITIAL ENCOUNTER FOR CLOSED FRACTURE: ICD-10-CM

## 2023-01-01 DIAGNOSIS — F41.9 ANXIETY DISORDER, UNSPECIFIED: ICD-10-CM

## 2023-01-01 DIAGNOSIS — Z29.9 ENCOUNTER FOR PROPHYLACTIC MEASURES, UNSPECIFIED: ICD-10-CM

## 2023-01-01 DIAGNOSIS — S72.141A DISPLACED INTERTROCHANTERIC FRACTURE OF RIGHT FEMUR, INITIAL ENCOUNTER FOR CLOSED FRACTURE: ICD-10-CM
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
## 2023-01-01 DIAGNOSIS — Z90.710 ACQUIRED ABSENCE OF BOTH CERVIX AND UTERUS: Chronic | ICD-10-CM

## 2023-01-01 DIAGNOSIS — I50.9 HEART FAILURE, UNSPECIFIED: ICD-10-CM

## 2023-01-01 DIAGNOSIS — E03.9 HYPOTHYROIDISM, UNSPECIFIED: ICD-10-CM

## 2023-01-01 DIAGNOSIS — Z98.890 OTHER SPECIFIED POSTPROCEDURAL STATES: Chronic | ICD-10-CM

## 2023-01-01 LAB
A1C WITH ESTIMATED AVERAGE GLUCOSE RESULT: 5.6 % — SIGNIFICANT CHANGE UP (ref 4–5.6)
ALBUMIN SERPL ELPH-MCNC: 2.5 G/DL — LOW (ref 3.3–5)
ALBUMIN SERPL ELPH-MCNC: 3.3 G/DL — SIGNIFICANT CHANGE UP (ref 3.3–5)
ALP SERPL-CCNC: 47 U/L — SIGNIFICANT CHANGE UP (ref 40–120)
ALP SERPL-CCNC: 61 U/L — SIGNIFICANT CHANGE UP (ref 40–120)
ALT FLD-CCNC: 17 U/L — SIGNIFICANT CHANGE UP (ref 12–78)
ALT FLD-CCNC: 24 U/L — SIGNIFICANT CHANGE UP (ref 12–78)
ANION GAP SERPL CALC-SCNC: 2 MMOL/L — LOW (ref 5–17)
ANION GAP SERPL CALC-SCNC: 4 MMOL/L — LOW (ref 5–17)
APTT BLD: 29.1 SEC — SIGNIFICANT CHANGE UP (ref 27.5–35.5)
APTT BLD: 29.3 SEC — SIGNIFICANT CHANGE UP (ref 27.5–35.5)
AST SERPL-CCNC: 16 U/L — SIGNIFICANT CHANGE UP (ref 15–37)
AST SERPL-CCNC: 22 U/L — SIGNIFICANT CHANGE UP (ref 15–37)
BASOPHILS # BLD AUTO: 0.04 K/UL — SIGNIFICANT CHANGE UP (ref 0–0.2)
BASOPHILS # BLD AUTO: 0.06 K/UL — SIGNIFICANT CHANGE UP (ref 0–0.2)
BASOPHILS NFR BLD AUTO: 0.5 % — SIGNIFICANT CHANGE UP (ref 0–2)
BASOPHILS NFR BLD AUTO: 0.6 % — SIGNIFICANT CHANGE UP (ref 0–2)
BILIRUB SERPL-MCNC: 0.3 MG/DL — SIGNIFICANT CHANGE UP (ref 0.2–1.2)
BILIRUB SERPL-MCNC: 0.3 MG/DL — SIGNIFICANT CHANGE UP (ref 0.2–1.2)
BUN SERPL-MCNC: 31 MG/DL — HIGH (ref 7–23)
BUN SERPL-MCNC: 38 MG/DL — HIGH (ref 7–23)
CALCIUM SERPL-MCNC: 6.8 MG/DL — LOW (ref 8.5–10.1)
CALCIUM SERPL-MCNC: 8.3 MG/DL — LOW (ref 8.5–10.1)
CHLORIDE SERPL-SCNC: 112 MMOL/L — HIGH (ref 96–108)
CHLORIDE SERPL-SCNC: 117 MMOL/L — HIGH (ref 96–108)
CHOLEST SERPL-MCNC: 150 MG/DL — SIGNIFICANT CHANGE UP
CK MB CFR SERPL CALC: 1.7 NG/ML — SIGNIFICANT CHANGE UP (ref 0–3.6)
CO2 SERPL-SCNC: 25 MMOL/L — SIGNIFICANT CHANGE UP (ref 22–31)
CO2 SERPL-SCNC: 26 MMOL/L — SIGNIFICANT CHANGE UP (ref 22–31)
CREAT SERPL-MCNC: 1.3 MG/DL — SIGNIFICANT CHANGE UP (ref 0.5–1.3)
CREAT SERPL-MCNC: 1.7 MG/DL — HIGH (ref 0.5–1.3)
EGFR: 27 ML/MIN/1.73M2 — LOW
EGFR: 38 ML/MIN/1.73M2 — LOW
EOSINOPHIL # BLD AUTO: 0.07 K/UL — SIGNIFICANT CHANGE UP (ref 0–0.5)
EOSINOPHIL # BLD AUTO: 0.09 K/UL — SIGNIFICANT CHANGE UP (ref 0–0.5)
EOSINOPHIL NFR BLD AUTO: 0.6 % — SIGNIFICANT CHANGE UP (ref 0–6)
EOSINOPHIL NFR BLD AUTO: 1.3 % — SIGNIFICANT CHANGE UP (ref 0–6)
ESTIMATED AVERAGE GLUCOSE: 114 MG/DL — SIGNIFICANT CHANGE UP (ref 68–114)
GLUCOSE SERPL-MCNC: 82 MG/DL — SIGNIFICANT CHANGE UP (ref 70–99)
GLUCOSE SERPL-MCNC: 95 MG/DL — SIGNIFICANT CHANGE UP (ref 70–99)
HCT VFR BLD CALC: 24.1 % — LOW (ref 34.5–45)
HCT VFR BLD CALC: 31.4 % — LOW (ref 34.5–45)
HDLC SERPL-MCNC: 52 MG/DL — SIGNIFICANT CHANGE UP
HGB BLD-MCNC: 7.4 G/DL — LOW (ref 11.5–15.5)
HGB BLD-MCNC: 9.8 G/DL — LOW (ref 11.5–15.5)
IMM GRANULOCYTES NFR BLD AUTO: 0.6 % — SIGNIFICANT CHANGE UP (ref 0–0.9)
IMM GRANULOCYTES NFR BLD AUTO: 0.6 % — SIGNIFICANT CHANGE UP (ref 0–0.9)
INR BLD: 1.03 RATIO — SIGNIFICANT CHANGE UP (ref 0.88–1.16)
INR BLD: 1.13 RATIO — SIGNIFICANT CHANGE UP (ref 0.88–1.16)
LIDOCAIN IGE QN: 270 U/L — SIGNIFICANT CHANGE UP (ref 73–393)
LIPID PNL WITH DIRECT LDL SERPL: 77 MG/DL — SIGNIFICANT CHANGE UP
LYMPHOCYTES # BLD AUTO: 1.7 K/UL — SIGNIFICANT CHANGE UP (ref 1–3.3)
LYMPHOCYTES # BLD AUTO: 18.2 % — SIGNIFICANT CHANGE UP (ref 13–44)
LYMPHOCYTES # BLD AUTO: 2.18 K/UL — SIGNIFICANT CHANGE UP (ref 1–3.3)
LYMPHOCYTES # BLD AUTO: 24 % — SIGNIFICANT CHANGE UP (ref 13–44)
MAGNESIUM SERPL-MCNC: 1.7 MG/DL — SIGNIFICANT CHANGE UP (ref 1.6–2.6)
MCHC RBC-ENTMCNC: 29.8 PG — SIGNIFICANT CHANGE UP (ref 27–34)
MCHC RBC-ENTMCNC: 30.1 PG — SIGNIFICANT CHANGE UP (ref 27–34)
MCHC RBC-ENTMCNC: 30.7 GM/DL — LOW (ref 32–36)
MCHC RBC-ENTMCNC: 31.2 GM/DL — LOW (ref 32–36)
MCV RBC AUTO: 96.3 FL — SIGNIFICANT CHANGE UP (ref 80–100)
MCV RBC AUTO: 97.2 FL — SIGNIFICANT CHANGE UP (ref 80–100)
MONOCYTES # BLD AUTO: 0.69 K/UL — SIGNIFICANT CHANGE UP (ref 0–0.9)
MONOCYTES # BLD AUTO: 0.98 K/UL — HIGH (ref 0–0.9)
MONOCYTES NFR BLD AUTO: 8.2 % — SIGNIFICANT CHANGE UP (ref 2–14)
MONOCYTES NFR BLD AUTO: 9.7 % — SIGNIFICANT CHANGE UP (ref 2–14)
NEUTROPHILS # BLD AUTO: 4.53 K/UL — SIGNIFICANT CHANGE UP (ref 1.8–7.4)
NEUTROPHILS # BLD AUTO: 8.63 K/UL — HIGH (ref 1.8–7.4)
NEUTROPHILS NFR BLD AUTO: 63.8 % — SIGNIFICANT CHANGE UP (ref 43–77)
NEUTROPHILS NFR BLD AUTO: 71.9 % — SIGNIFICANT CHANGE UP (ref 43–77)
NON HDL CHOLESTEROL: 98 MG/DL — SIGNIFICANT CHANGE UP
NRBC # BLD: 0 /100 WBCS — SIGNIFICANT CHANGE UP (ref 0–0)
NRBC # BLD: 0 /100 WBCS — SIGNIFICANT CHANGE UP (ref 0–0)
NT-PROBNP SERPL-SCNC: 4649 PG/ML — HIGH (ref 0–450)
PHOSPHATE SERPL-MCNC: 2.9 MG/DL — SIGNIFICANT CHANGE UP (ref 2.5–4.5)
PLATELET # BLD AUTO: 204 K/UL — SIGNIFICANT CHANGE UP (ref 150–400)
PLATELET # BLD AUTO: 321 K/UL — SIGNIFICANT CHANGE UP (ref 150–400)
POTASSIUM SERPL-MCNC: 3.6 MMOL/L — SIGNIFICANT CHANGE UP (ref 3.5–5.3)
POTASSIUM SERPL-MCNC: 4.2 MMOL/L — SIGNIFICANT CHANGE UP (ref 3.5–5.3)
POTASSIUM SERPL-SCNC: 3.6 MMOL/L — SIGNIFICANT CHANGE UP (ref 3.5–5.3)
POTASSIUM SERPL-SCNC: 4.2 MMOL/L — SIGNIFICANT CHANGE UP (ref 3.5–5.3)
PROT SERPL-MCNC: 5.6 G/DL — LOW (ref 6–8.3)
PROT SERPL-MCNC: 7.5 G/DL — SIGNIFICANT CHANGE UP (ref 6–8.3)
PROTHROM AB SERPL-ACNC: 12.1 SEC — SIGNIFICANT CHANGE UP (ref 10.5–13.4)
PROTHROM AB SERPL-ACNC: 13.2 SEC — SIGNIFICANT CHANGE UP (ref 10.5–13.4)
RAPID RVP RESULT: SIGNIFICANT CHANGE UP
RBC # BLD: 2.48 M/UL — LOW (ref 3.8–5.2)
RBC # BLD: 3.26 M/UL — LOW (ref 3.8–5.2)
RBC # FLD: 14.1 % — SIGNIFICANT CHANGE UP (ref 10.3–14.5)
RBC # FLD: 14.1 % — SIGNIFICANT CHANGE UP (ref 10.3–14.5)
SARS-COV-2 RNA SPEC QL NAA+PROBE: SIGNIFICANT CHANGE UP
SODIUM SERPL-SCNC: 142 MMOL/L — SIGNIFICANT CHANGE UP (ref 135–145)
SODIUM SERPL-SCNC: 144 MMOL/L — SIGNIFICANT CHANGE UP (ref 135–145)
TRIGL SERPL-MCNC: 118 MG/DL — SIGNIFICANT CHANGE UP
TROPONIN I, HIGH SENSITIVITY RESULT: 29.2 NG/L — SIGNIFICANT CHANGE UP
TSH SERPL-MCNC: 4.71 UIU/ML — HIGH (ref 0.36–3.74)
WBC # BLD: 11.99 K/UL — HIGH (ref 3.8–10.5)
WBC # BLD: 7.09 K/UL — SIGNIFICANT CHANGE UP (ref 3.8–10.5)
WBC # FLD AUTO: 11.99 K/UL — HIGH (ref 3.8–10.5)
WBC # FLD AUTO: 7.09 K/UL — SIGNIFICANT CHANGE UP (ref 3.8–10.5)

## 2023-01-01 PROCEDURE — 99223 1ST HOSP IP/OBS HIGH 75: CPT

## 2023-01-01 PROCEDURE — 73552 X-RAY EXAM OF FEMUR 2/>: CPT | Mod: 26,RT

## 2023-01-01 PROCEDURE — 73502 X-RAY EXAM HIP UNI 2-3 VIEWS: CPT | Mod: 26,RT

## 2023-01-01 PROCEDURE — 80053 COMPREHEN METABOLIC PANEL: CPT

## 2023-01-01 PROCEDURE — 73700 CT LOWER EXTREMITY W/O DYE: CPT | Mod: MA

## 2023-01-01 PROCEDURE — 83036 HEMOGLOBIN GLYCOSYLATED A1C: CPT

## 2023-01-01 PROCEDURE — 96375 TX/PRO/DX INJ NEW DRUG ADDON: CPT

## 2023-01-01 PROCEDURE — 93010 ELECTROCARDIOGRAM REPORT: CPT

## 2023-01-01 PROCEDURE — 76376 3D RENDER W/INTRP POSTPROCES: CPT

## 2023-01-01 PROCEDURE — 82553 CREATINE MB FRACTION: CPT

## 2023-01-01 PROCEDURE — 73502 X-RAY EXAM HIP UNI 2-3 VIEWS: CPT

## 2023-01-01 PROCEDURE — 73700 CT LOWER EXTREMITY W/O DYE: CPT | Mod: 26,RT,MA

## 2023-01-01 PROCEDURE — 99223 1ST HOSP IP/OBS HIGH 75: CPT | Mod: GC

## 2023-01-01 PROCEDURE — 83880 ASSAY OF NATRIURETIC PEPTIDE: CPT

## 2023-01-01 PROCEDURE — 72125 CT NECK SPINE W/O DYE: CPT | Mod: MA

## 2023-01-01 PROCEDURE — 71250 CT THORAX DX C-: CPT

## 2023-01-01 PROCEDURE — 86901 BLOOD TYPING SEROLOGIC RH(D): CPT

## 2023-01-01 PROCEDURE — 70450 CT HEAD/BRAIN W/O DYE: CPT | Mod: 26,MA

## 2023-01-01 PROCEDURE — 99285 EMERGENCY DEPT VISIT HI MDM: CPT

## 2023-01-01 PROCEDURE — 99232 SBSQ HOSP IP/OBS MODERATE 35: CPT

## 2023-01-01 PROCEDURE — 71045 X-RAY EXAM CHEST 1 VIEW: CPT | Mod: 26

## 2023-01-01 PROCEDURE — 36415 COLL VENOUS BLD VENIPUNCTURE: CPT

## 2023-01-01 PROCEDURE — 83690 ASSAY OF LIPASE: CPT

## 2023-01-01 PROCEDURE — 99497 ADVNCD CARE PLAN 30 MIN: CPT | Mod: 25

## 2023-01-01 PROCEDURE — 85730 THROMBOPLASTIN TIME PARTIAL: CPT

## 2023-01-01 PROCEDURE — 99285 EMERGENCY DEPT VISIT HI MDM: CPT | Mod: 25

## 2023-01-01 PROCEDURE — 80061 LIPID PANEL: CPT

## 2023-01-01 PROCEDURE — 99233 SBSQ HOSP IP/OBS HIGH 50: CPT

## 2023-01-01 PROCEDURE — 73552 X-RAY EXAM OF FEMUR 2/>: CPT

## 2023-01-01 PROCEDURE — 84484 ASSAY OF TROPONIN QUANT: CPT

## 2023-01-01 PROCEDURE — 93005 ELECTROCARDIOGRAM TRACING: CPT

## 2023-01-01 PROCEDURE — 99239 HOSP IP/OBS DSCHRG MGMT >30: CPT

## 2023-01-01 PROCEDURE — 97162 PT EVAL MOD COMPLEX 30 MIN: CPT

## 2023-01-01 PROCEDURE — 99221 1ST HOSP IP/OBS SF/LOW 40: CPT

## 2023-01-01 PROCEDURE — 0225U NFCT DS DNA&RNA 21 SARSCOV2: CPT

## 2023-01-01 PROCEDURE — 84443 ASSAY THYROID STIM HORMONE: CPT

## 2023-01-01 PROCEDURE — 86923 COMPATIBILITY TEST ELECTRIC: CPT

## 2023-01-01 PROCEDURE — 72125 CT NECK SPINE W/O DYE: CPT | Mod: 26,MA

## 2023-01-01 PROCEDURE — 96374 THER/PROPH/DIAG INJ IV PUSH: CPT

## 2023-01-01 PROCEDURE — 86900 BLOOD TYPING SEROLOGIC ABO: CPT

## 2023-01-01 PROCEDURE — 86850 RBC ANTIBODY SCREEN: CPT

## 2023-01-01 PROCEDURE — 85025 COMPLETE CBC W/AUTO DIFF WBC: CPT

## 2023-01-01 PROCEDURE — 71045 X-RAY EXAM CHEST 1 VIEW: CPT

## 2023-01-01 PROCEDURE — 70450 CT HEAD/BRAIN W/O DYE: CPT | Mod: MA

## 2023-01-01 PROCEDURE — 84100 ASSAY OF PHOSPHORUS: CPT

## 2023-01-01 PROCEDURE — 83735 ASSAY OF MAGNESIUM: CPT

## 2023-01-01 PROCEDURE — 71250 CT THORAX DX C-: CPT | Mod: 26

## 2023-01-01 PROCEDURE — 76376 3D RENDER W/INTRP POSTPROCES: CPT | Mod: 26

## 2023-01-01 PROCEDURE — 85610 PROTHROMBIN TIME: CPT

## 2023-01-01 RX ORDER — ACETAMINOPHEN 500 MG
2 TABLET ORAL
Qty: 0 | Refills: 0 | DISCHARGE
Start: 2023-01-01

## 2023-01-01 RX ORDER — FUROSEMIDE 40 MG
40 TABLET ORAL ONCE
Refills: 0 | Status: DISCONTINUED | OUTPATIENT
Start: 2023-01-01 | End: 2023-01-01

## 2023-01-01 RX ORDER — METOPROLOL TARTRATE 50 MG
1 TABLET ORAL
Qty: 0 | Refills: 0 | DISCHARGE

## 2023-01-01 RX ORDER — LANOLIN ALCOHOL/MO/W.PET/CERES
1 CREAM (GRAM) TOPICAL
Qty: 0 | Refills: 0 | DISCHARGE
Start: 2023-01-01

## 2023-01-01 RX ORDER — ACETAMINOPHEN 500 MG
650 TABLET ORAL ONCE
Refills: 0 | Status: COMPLETED | OUTPATIENT
Start: 2023-01-01 | End: 2023-01-01

## 2023-01-01 RX ORDER — PANTOPRAZOLE SODIUM 20 MG/1
40 TABLET, DELAYED RELEASE ORAL
Refills: 0 | Status: DISCONTINUED | OUTPATIENT
Start: 2023-01-01 | End: 2023-01-01

## 2023-01-01 RX ORDER — LEVOTHYROXINE SODIUM 125 MCG
75 TABLET ORAL DAILY
Refills: 0 | Status: DISCONTINUED | OUTPATIENT
Start: 2023-01-01 | End: 2023-01-01

## 2023-01-01 RX ORDER — MAGNESIUM SULFATE 500 MG/ML
1 VIAL (ML) INJECTION ONCE
Refills: 0 | Status: DISCONTINUED | OUTPATIENT
Start: 2023-01-01 | End: 2023-01-01

## 2023-01-01 RX ORDER — POTASSIUM CHLORIDE 20 MEQ
40 PACKET (EA) ORAL ONCE
Refills: 0 | Status: COMPLETED | OUTPATIENT
Start: 2023-01-01 | End: 2023-01-01

## 2023-01-01 RX ORDER — POTASSIUM PHOSPHATE, MONOBASIC POTASSIUM PHOSPHATE, DIBASIC 236; 224 MG/ML; MG/ML
15 INJECTION, SOLUTION INTRAVENOUS ONCE
Refills: 0 | Status: DISCONTINUED | OUTPATIENT
Start: 2023-01-01 | End: 2023-01-01

## 2023-01-01 RX ORDER — SODIUM CHLORIDE 9 MG/ML
1000 INJECTION INTRAMUSCULAR; INTRAVENOUS; SUBCUTANEOUS
Refills: 0 | Status: DISCONTINUED | OUTPATIENT
Start: 2023-01-01 | End: 2023-01-01

## 2023-01-01 RX ORDER — OXYCODONE HYDROCHLORIDE 5 MG/1
2.5 TABLET ORAL
Qty: 0 | Refills: 0 | DISCHARGE
Start: 2023-01-01

## 2023-01-01 RX ORDER — SENNA PLUS 8.6 MG/1
2 TABLET ORAL AT BEDTIME
Refills: 0 | Status: DISCONTINUED | OUTPATIENT
Start: 2023-01-01 | End: 2023-01-01

## 2023-01-01 RX ORDER — FUROSEMIDE 40 MG
20 TABLET ORAL
Refills: 0 | Status: DISCONTINUED | OUTPATIENT
Start: 2023-01-01 | End: 2023-01-01

## 2023-01-01 RX ORDER — LIDOCAINE 4 G/100G
1 CREAM TOPICAL
Qty: 0 | Refills: 0 | DISCHARGE
Start: 2023-01-01

## 2023-01-01 RX ORDER — SODIUM CHLORIDE 9 MG/ML
1000 INJECTION, SOLUTION INTRAVENOUS
Refills: 0 | Status: DISCONTINUED | OUTPATIENT
Start: 2023-01-01 | End: 2023-01-01

## 2023-01-01 RX ORDER — SENNA PLUS 8.6 MG/1
2 TABLET ORAL
Qty: 0 | Refills: 0 | DISCHARGE
Start: 2023-01-01

## 2023-01-01 RX ORDER — METOPROLOL TARTRATE 50 MG
50 TABLET ORAL DAILY
Refills: 0 | Status: DISCONTINUED | OUTPATIENT
Start: 2023-01-01 | End: 2023-01-01

## 2023-01-01 RX ORDER — CITALOPRAM 10 MG/1
20 TABLET, FILM COATED ORAL DAILY
Refills: 0 | Status: DISCONTINUED | OUTPATIENT
Start: 2023-01-01 | End: 2023-01-01

## 2023-01-01 RX ORDER — BACITRACIN ZINC 500 UNIT/G
1 OINTMENT IN PACKET (EA) TOPICAL ONCE
Refills: 0 | Status: COMPLETED | OUTPATIENT
Start: 2023-01-01 | End: 2023-01-01

## 2023-01-01 RX ORDER — OXYCODONE HYDROCHLORIDE 5 MG/1
5 TABLET ORAL EVERY 4 HOURS
Refills: 0 | Status: DISCONTINUED | OUTPATIENT
Start: 2023-01-01 | End: 2023-01-01

## 2023-01-01 RX ORDER — LIDOCAINE 4 G/100G
1 CREAM TOPICAL DAILY
Refills: 0 | Status: DISCONTINUED | OUTPATIENT
Start: 2023-01-01 | End: 2023-01-01

## 2023-01-01 RX ORDER — CITALOPRAM 10 MG/1
1 TABLET, FILM COATED ORAL
Qty: 0 | Refills: 0 | DISCHARGE
Start: 2023-01-01

## 2023-01-01 RX ORDER — HYDROMORPHONE HYDROCHLORIDE 2 MG/ML
0.25 INJECTION INTRAMUSCULAR; INTRAVENOUS; SUBCUTANEOUS EVERY 4 HOURS
Refills: 0 | Status: DISCONTINUED | OUTPATIENT
Start: 2023-01-01 | End: 2023-01-01

## 2023-01-01 RX ORDER — CITALOPRAM 10 MG/1
40 TABLET, FILM COATED ORAL DAILY
Refills: 0 | Status: DISCONTINUED | OUTPATIENT
Start: 2023-01-01 | End: 2023-01-01

## 2023-01-01 RX ORDER — OXYCODONE HYDROCHLORIDE 5 MG/1
1 TABLET ORAL
Qty: 0 | Refills: 0 | DISCHARGE
Start: 2023-01-01

## 2023-01-01 RX ORDER — TRAMADOL HYDROCHLORIDE 50 MG/1
25 TABLET ORAL EVERY 4 HOURS
Refills: 0 | Status: DISCONTINUED | OUTPATIENT
Start: 2023-01-01 | End: 2023-01-01

## 2023-01-01 RX ORDER — HYDROMORPHONE HYDROCHLORIDE 2 MG/ML
0.5 INJECTION INTRAMUSCULAR; INTRAVENOUS; SUBCUTANEOUS EVERY 4 HOURS
Refills: 0 | Status: DISCONTINUED | OUTPATIENT
Start: 2023-01-01 | End: 2023-01-01

## 2023-01-01 RX ORDER — POTASSIUM CHLORIDE 20 MEQ
1 PACKET (EA) ORAL
Qty: 0 | Refills: 0 | DISCHARGE

## 2023-01-01 RX ORDER — POTASSIUM CHLORIDE 20 MEQ
1 PACKET (EA) ORAL
Refills: 0 | DISCHARGE

## 2023-01-01 RX ORDER — LANOLIN ALCOHOL/MO/W.PET/CERES
3 CREAM (GRAM) TOPICAL AT BEDTIME
Refills: 0 | Status: DISCONTINUED | OUTPATIENT
Start: 2023-01-01 | End: 2023-01-01

## 2023-01-01 RX ORDER — OXYCODONE HYDROCHLORIDE 5 MG/1
2.5 TABLET ORAL EVERY 4 HOURS
Refills: 0 | Status: DISCONTINUED | OUTPATIENT
Start: 2023-01-01 | End: 2023-01-01

## 2023-01-01 RX ORDER — MORPHINE SULFATE 50 MG/1
2 CAPSULE, EXTENDED RELEASE ORAL ONCE
Refills: 0 | Status: DISCONTINUED | OUTPATIENT
Start: 2023-01-01 | End: 2023-01-01

## 2023-01-01 RX ORDER — POTASSIUM CHLORIDE 20 MEQ
10 PACKET (EA) ORAL
Refills: 0 | Status: COMPLETED | OUTPATIENT
Start: 2023-01-01 | End: 2023-01-01

## 2023-01-01 RX ORDER — OMEPRAZOLE 10 MG/1
1 CAPSULE, DELAYED RELEASE ORAL
Qty: 0 | Refills: 0 | DISCHARGE

## 2023-01-01 RX ORDER — CITALOPRAM 10 MG/1
1 TABLET, FILM COATED ORAL
Qty: 0 | Refills: 0 | DISCHARGE

## 2023-01-01 RX ORDER — AMLODIPINE BESYLATE 2.5 MG/1
1 TABLET ORAL
Qty: 0 | Refills: 0 | DISCHARGE

## 2023-01-01 RX ORDER — ACETAMINOPHEN 500 MG
650 TABLET ORAL EVERY 6 HOURS
Refills: 0 | Status: DISCONTINUED | OUTPATIENT
Start: 2023-01-01 | End: 2023-01-01

## 2023-01-01 RX ADMIN — SODIUM CHLORIDE 50 MILLILITER(S): 9 INJECTION INTRAMUSCULAR; INTRAVENOUS; SUBCUTANEOUS at 01:01

## 2023-01-01 RX ADMIN — LIDOCAINE 1 PATCH: 4 CREAM TOPICAL at 21:59

## 2023-01-01 RX ADMIN — LIDOCAINE 1 PATCH: 4 CREAM TOPICAL at 00:26

## 2023-01-01 RX ADMIN — SODIUM CHLORIDE 50 MILLILITER(S): 9 INJECTION, SOLUTION INTRAVENOUS at 10:09

## 2023-01-01 RX ADMIN — Medication 50 MILLIGRAM(S): at 06:10

## 2023-01-01 RX ADMIN — TRAMADOL HYDROCHLORIDE 25 MILLIGRAM(S): 50 TABLET ORAL at 06:36

## 2023-01-01 RX ADMIN — Medication 50 MILLIGRAM(S): at 05:55

## 2023-01-01 RX ADMIN — Medication 1 APPLICATION(S): at 18:00

## 2023-01-01 RX ADMIN — MORPHINE SULFATE 2 MILLIGRAM(S): 50 CAPSULE, EXTENDED RELEASE ORAL at 20:47

## 2023-01-01 RX ADMIN — Medication 50 MILLIGRAM(S): at 05:31

## 2023-01-01 RX ADMIN — Medication 260 MILLIGRAM(S): at 20:25

## 2023-01-01 RX ADMIN — MORPHINE SULFATE 2 MILLIGRAM(S): 50 CAPSULE, EXTENDED RELEASE ORAL at 19:47

## 2023-01-01 RX ADMIN — CITALOPRAM 20 MILLIGRAM(S): 10 TABLET, FILM COATED ORAL at 11:23

## 2023-01-01 RX ADMIN — CITALOPRAM 20 MILLIGRAM(S): 10 TABLET, FILM COATED ORAL at 12:13

## 2023-01-01 RX ADMIN — HYDROMORPHONE HYDROCHLORIDE 0.5 MILLIGRAM(S): 2 INJECTION INTRAMUSCULAR; INTRAVENOUS; SUBCUTANEOUS at 13:31

## 2023-01-01 RX ADMIN — Medication 75 MICROGRAM(S): at 06:10

## 2023-01-01 RX ADMIN — LIDOCAINE 1 PATCH: 4 CREAM TOPICAL at 12:13

## 2023-01-01 RX ADMIN — OXYCODONE HYDROCHLORIDE 5 MILLIGRAM(S): 5 TABLET ORAL at 10:59

## 2023-01-01 RX ADMIN — Medication 75 MICROGRAM(S): at 07:03

## 2023-01-01 RX ADMIN — Medication 650 MILLIGRAM(S): at 20:47

## 2023-01-01 RX ADMIN — HYDROMORPHONE HYDROCHLORIDE 0.5 MILLIGRAM(S): 2 INJECTION INTRAMUSCULAR; INTRAVENOUS; SUBCUTANEOUS at 13:50

## 2023-01-01 RX ADMIN — OXYCODONE HYDROCHLORIDE 5 MILLIGRAM(S): 5 TABLET ORAL at 11:59

## 2023-01-01 RX ADMIN — PANTOPRAZOLE SODIUM 40 MILLIGRAM(S): 20 TABLET, DELAYED RELEASE ORAL at 05:31

## 2023-01-01 RX ADMIN — Medication 75 MICROGRAM(S): at 05:31

## 2023-01-01 RX ADMIN — PANTOPRAZOLE SODIUM 40 MILLIGRAM(S): 20 TABLET, DELAYED RELEASE ORAL at 06:11

## 2023-01-01 RX ADMIN — PANTOPRAZOLE SODIUM 40 MILLIGRAM(S): 20 TABLET, DELAYED RELEASE ORAL at 07:05

## 2023-01-01 RX ADMIN — TRAMADOL HYDROCHLORIDE 25 MILLIGRAM(S): 50 TABLET ORAL at 05:36

## 2023-01-01 RX ADMIN — LIDOCAINE 1 PATCH: 4 CREAM TOPICAL at 11:23

## 2023-06-21 PROBLEM — I50.9 HEART FAILURE, UNSPECIFIED: Chronic | Status: ACTIVE | Noted: 2022-05-24

## 2023-06-21 PROBLEM — F41.9 ANXIETY DISORDER, UNSPECIFIED: Chronic | Status: ACTIVE | Noted: 2022-05-24

## 2023-07-07 PROBLEM — Z00.00 ENCOUNTER FOR PREVENTIVE HEALTH EXAMINATION: Status: ACTIVE | Noted: 2023-01-01

## 2023-07-23 NOTE — ED PROVIDER NOTE - CLINICAL SUMMARY MEDICAL DECISION MAKING FREE TEXT BOX
Patient is a 89-year-old female presents to the emergency room with a chief complaint of hip pain following fall.  Past medical history of frequent falls, history of hypertension, hypothyroidism, depression, anxiety.  Patient reports that she was walking with an empty T cup in her hand when she lost her balance and fell forwards onto her right side.  She is post to ambulate with a walker but does not always use this.  Denies LOC is unsure if she hit her head.  Did land on her right side sustaining skin tears to the right arm lower extremity but has had severe hip pain since.  Patient was found by her son-in-law to be on the ground for approximately 5 minutes.  She was helped up by her son-in-law but is not been able to bear weight on her right hip since.  Denies extremity numbness.  Denies loss of bowel or bladder control.  Denies any chest pain shortness of breath or abdominal pain.  Of note patient has had a prior fracture of the left hip status postrepair. Patient is a 89-year-old female presents to the emergency room with a chief complaint of hip pain following fall.  Past medical history of frequent falls, history of hypertension, hypothyroidism, depression, anxiety.  Patient reports that she was walking with an empty T cup in her hand when she lost her balance and fell forwards onto her right side.  She is post to ambulate with a walker but does not always use this.  Denies LOC is unsure if she hit her head.  Did land on her right side sustaining skin tears to the right arm lower extremity but has had severe hip pain since.  Patient was found by her son-in-law to be on the ground for approximately 5 minutes.  She was helped up by her son-in-law but is not been able to bear weight on her right hip since.  Denies extremity numbness.  Denies loss of bowel or bladder control.  Denies any chest pain shortness of breath or abdominal pain.  Of note patient has had a prior fracture of the left hip status postrepair. Patient presenting to the emergency room status post mechanical fall with right hip pain.  Neurovascular intact also with skin abrasions.  Differential includes but not limited to possible sprain strain versus fracture although high suspicion for fracture based on patient's current presentation.  Will obtain screening preop labs medicate as needed for pain and obtain x-ray imaging of the hip.  Labs reviewed patient with a white count of 11 no coagulopathy creatinine of 1.7 appears to be a mild renal insufficiency we will provide gentle hydration.  Independent review of EKG reveals normal sinus rhythm at 68 bpm independent review of hip x-rays reveal what appears to be a right impacted hip fracture.  CT imaging ordered along with CT imaging of the head and C-spine as patient did fall with questionable head injury.  No acute intercranial hemorrhage noted there is a nondisplaced right inotrope fracture.  Orthopedics made aware will admit for further work-up and management.  Of note there is a patchy right upper lobe opacity and an ill-defined 1.2 cm nodule opacity of indeterminate nature medicine team was made aware of this patient is currently asymptomatic will hold abx for now medicine team to follow

## 2023-07-23 NOTE — H&P ADULT - HISTORY OF PRESENT ILLNESS
89y female with PMHx of multiple falls, HFpEF (EF 60%), hypertension, hypothyroidism, depression, anxiety presented to the ED for the evaluation of right hip pain s/p mechanical fall. 89y female with PMHx of multiple falls, HFpEF (EF 60%), hypertension, hypothyroidism, depression, anxiety presented to the ED for the evaluation of right hip pain s/p mechanical fall. Kwame (son in law) present at bedside. Pt. states that around 4pm today she was walking with an empty tea cup in her hand and fell down forwards onto the right side; hit her head, right arm, hip, and knee and was bleeding. Pt. fell onto wood floor and was down for 5 minutes before son in law found her and picked her up. Pt reports not being able bear weight on her legs and right hip pain on movement, sharp, rated as 6/10 a/w numbness on her lateral right thigh. Pt reports headache, weakness and fatigue; reports recent increased stress d/t living with daughter and son in law. Son in law reports multiple falls this week, pt fell 2 days ago and 4 days ago; also reports decreased PO intake over the past few months - pt. eats a roll in the morning with tea and handful of carbs or protein at night.     Pt is DNR/DNI; MOLST form has been signed previously.     Denies fever, chills, chest pain, palpitations, SOB, cough, abdominal pain, nausea, vomiting, diarrhea, constipation, hematochezia, melena, urinary frequency, urgency, dysuria, hematuria, changes in vision, dizziness. No other complaints at this time.    ED course:  Vitals: BP: 154/56, HR: 62, Temp: 98F, RR: 18, SpO2: 96% on RA  Labs significant for: Hgb at baseline 9.8, Cr at baseline 1.70, eGFR 27  EKG: NSR, prolonged QTc 499, HR 68  In ED given: Ofirmev 650mg IV x1, Morphine 2mg IV x1     Imaging  CT head: No acute abnormality. Chronic changes as above.  CT cervical spine: No acute abnormality. Chronic changes as above. There is partial visualization of an airspace consolidation in the right upper lobe, for which a dedicated CT chest may be helpful for further evaluation.  CT hip: Nondisplaced right intertrochanteric fracture with impaction laterally. 89y female with PMHx of multiple falls, HFpEF (EF 60%), hypertension, hypothyroidism, depression, anxiety presented to the ED for the evaluation of right hip pain s/p mechanical fall. Kwame (son in law) present at bedside. Pt. states that around 4pm today she was walking with an empty tea cup in her hand and fell down forwards onto the right side; hit her head, right arm, hip, and knee and was bleeding. Pt. fell onto wood floor and was down for 5 minutes before son in law found her and picked her up. Pt reports not being able bear weight on her legs and right hip pain on movement, sharp, rated as 6/10 a/w numbness on her lateral right thigh. Pt reports headache, weakness and fatigue; reports recent increased stress d/t living with daughter and son in law. Son in law reports multiple falls this week, pt fell 2 days ago and 4 days ago; also reports decreased PO intake over the past few months - pt. eats a roll in the morning with tea and handful of carbs or protein at night. States that she is in pain despite IV morphine and tylenol.    Pt is DNR/DNI; MOLST form has been signed previously.     Denies fever, chills, chest pain, palpitations, SOB, cough, abdominal pain, nausea, vomiting, diarrhea, constipation, hematochezia, melena, urinary frequency, urgency, dysuria, hematuria, changes in vision, dizziness. No other complaints at this time.    ED course:  Vitals: BP: 154/56, HR: 62, Temp: 98F, RR: 18, SpO2: 96% on RA  Labs significant for: Hgb at baseline 9.8, Cr at baseline 1.70, eGFR 27  EKG: NSR, prolonged QTc 499, HR 68  In ED given: Ofirmev 650mg IV x1, Morphine 2mg IV x1   CT head: No acute abnormality. Chronic changes as above.  CT cervical spine: No acute abnormality. Chronic changes as above. There is partial visualization of an airspace consolidation in the right upper lobe, for which a dedicated CT chest may be helpful for further evaluation.  CT hip: Nondisplaced right intertrochanteric fracture with impaction laterally.

## 2023-07-23 NOTE — H&P ADULT - PROBLEM SELECTOR PLAN 5
Chronic, 154/56 on admission  - Continue home metoprolol Chronic, 154/56 on admission  - Continue home metoprolol with hold parameters Chronic, 154/56 on admission  - Continue home metoprolol with hold parameters  - monitor hemodynamics

## 2023-07-23 NOTE — ED ADULT NURSE REASSESSMENT NOTE - NS ED NURSE REASSESS COMMENT FT1
Pt received from JASON Villeda. Pt is Aox4 son at bedside. Pt has right hip pain. abrasions noted on her skin. pain medicine given. VSS Will reassess.

## 2023-07-23 NOTE — H&P ADULT - NSICDXFAMILYHX_GEN_ALL_CORE_FT
FAMILY HISTORY:  Father  Still living? No  FH: heart disease, Age at diagnosis: Age Unknown     FAMILY HISTORY:  Father  Still living? No  FH: heart disease, Age at diagnosis: Age Unknown    Mother  Still living? Unknown  FH: heart disease, Age at diagnosis: Age Unknown    Sibling  Still living? Unknown  FH: lung cancer, Age at diagnosis: Age Unknown

## 2023-07-23 NOTE — CONSULT NOTE ADULT - ASSESSMENT
89 year old female with impacted right intertrochanteric hip fracture    Discussed with Dr. Winston  Plan for OR Tomorrow for Right Hip IM Nail  Cardiology Consulted for Clearance/Risk Stratification Prior to OR  NPO After 8AM - IV Hydration  Bed Rest, NWB RLE  SCDs to B/L Legs for DVT PPX- Hold Chemical PPX for OR  Pain Control

## 2023-07-23 NOTE — CONSULT NOTE ADULT - EXTREMITIES COMMENTS
RLE - no gross deformities, chronic skin changes with C/D/I dressings to superficial skin tears. +tenderness to palpation of right hip and groin, no tenderness to thigh, knee, lower leg, ankle, foot. Hip ROM limited 2nd to pain, painless ROM of knee, ankle and foot. +log roll. no instability. Full Painless ROM of remainder of extremities, non-tender to palpation.

## 2023-07-23 NOTE — H&P ADULT - PROBLEM SELECTOR PLAN 3
- Continue home metoprolol and lasix - Continue home metoprolol; hold lasix Chronic HFpEF - does not appear to be overtly volume overloaded - compensated  - Will hold loop diuretic for now while NPO and receiving IV fluids  - Daily weights, strict I's and O's

## 2023-07-23 NOTE — H&P ADULT - ATTENDING COMMENTS
89y female with PMHx of multiple falls, HFpEF (EF 60%), hypertension, CKD III-IV, hypothyroidism, depression, anxiety admitted with nondisplaced right intertrochanteric fracture s/p mechanical fall. Planning for OR for right hip IMN. Admit to medicine. NPO, gentle IVF, pain control PRN. Planning for IMN on 7/24. No modifiable risk factors at this time. Will consult cardiology per orthopedics for cardiac clearance. No absolute medical contraindication for OR. Watch H/H and renal function. Last admission with post op anemia and ATN. Discussed with patient at bedside.    Agree with H&P as outlined above, edited where appropriate.

## 2023-07-23 NOTE — H&P ADULT - NSHPREVIEWOFSYSTEMS_GEN_ALL_CORE
CONSTITUTIONAL: denies fever, chills, diaphoresis, dizziness, lightheadedness; + fatigue, weakness   HEENT: denies blurred vision, sore throat, cough; + headache   SKIN: denies new lesions, rash, hives, itching  CARDIOVASCULAR: denies chest pain, chest pressure, palpitations  RESPIRATORY: denies shortness of breath, SIDHU, wheezing, sputum production  GASTROINTESTINAL: denies nausea, vomiting, diarrhea, constipation, abdominal pain, hematochezia, melena  GENITOURINARY: denies dysuria, polyuria, hematuria, discharge  NEUROLOGICAL: denies syncope, LOC; + right lateral thigh numbness  MUSCULOSKELETAL: denies new joint pain, joint swelling, muscle aches; + right hip pain on movement, right elbow, forearm, and knee pain and lacerations.   HEMATOLOGIC: denies gross bleeding; + right elbow, forearm and knee lacerations and bruising   LYMPHATICS: denies enlarged lymph nodes  PSYCHIATRIC: denies recent changes in anxiety, depression; + recent stress  ENDOCRINOLOGIC: denies sweating, cold or heat intolerance

## 2023-07-23 NOTE — H&P ADULT - PROBLEM SELECTOR PLAN 7
DVT PPx: hold ac; possible OR in the AM     #DISPO  - PT Eval DVT PPx: hold ac; possible OR in the AM; SCDs     #DISPO  - PT Eval  - Social work eval   - Nutrition consulted

## 2023-07-23 NOTE — ED ADULT NURSE NOTE - OBJECTIVE STATEMENT
pt aox3,  from home with son," multiple falls last few days, rt hip area pain, rt arm, leg skin tear" RROM, por cap refill

## 2023-07-23 NOTE — CONSULT NOTE ADULT - SUBJECTIVE AND OBJECTIVE BOX
89 year old female presenting to ED complaining of right hip pain and inability to ambulate after a fall from standing height at home. Pain is constant, aching, and non-radiating, worsened with hip movement and palpation, improved with rest and pain medication. Denies hitting her head, LOC, neck pain, back pain, numbness, tingling, weakness. Patient states she has a walker at home that she should use but does not use it. Admits to falling about a year ago and fracturing her left hip, was fixed with surgery and had uncomplicated recovery.

## 2023-07-23 NOTE — CONSULT NOTE ADULT - MUSCULOSKELETAL
details… no calf tenderness/decreased ROM due to pain/strength 5/5 bilateral upper extremities/strength 5/5 bilateral lower extremities/back exam/extremities exam

## 2023-07-23 NOTE — H&P ADULT - NSHPPHYSICALEXAM_GEN_ALL_CORE
T(C): 36.7 (07-23-23 @ 19:00), Max: 36.7 (07-23-23 @ 19:00)  HR: 62 (07-23-23 @ 19:00) (61 - 62)  BP: 154/56 (07-23-23 @ 19:00) (154/56 - 166/48)  RR: 18 (07-23-23 @ 19:00) (18 - 18)  SpO2: 96% (07-23-23 @ 19:00) (96% - 96%)    GENERAL: patient appears well, in mild distress, appropriate, pleasant  HEENT: sclera clear, no exudates; oropharynx clear without erythema, no exudates, moist mucous membranes; right forehead hematoma noted and tender to touch   NECK: supple, soft, no thyromegaly noted  LUNGS: good air entry bilaterally, clear to auscultation, symmetric breath sounds, no wheezing or rhonchi appreciated  HEART: soft S1/S2, regular rate and rhythm, no lower extremity edema, holosystolic murmur noted   GASTROINTESTINAL: abdomen is soft, nontender, nondistended, normoactive bowel sounds, no palpable masses  INTEGUMENT: good skin turgor, multiple ecchymoses on B/L UE and LE   MUSCULOSKELETAL: no clubbing or cyanosis, no obvious deformity; right elbow, dorsal forearm, and knee lacerations.   NEUROLOGIC: awake, alert, oriented x3, good muscle tone in 4 extremities, EOMI, no cervical tender to palpation, decreased sensation to touch on right lateral thigh and leg  PSYCHIATRIC: mood is good, affect is congruent, linear and logical thought process  HEME/LYMPH: no palpable supraclavicular nodules, T(C): 36.7 (07-23-23 @ 19:00), Max: 36.7 (07-23-23 @ 19:00)  HR: 62 (07-23-23 @ 19:00) (61 - 62)  BP: 154/56 (07-23-23 @ 19:00) (154/56 - 166/48)  RR: 18 (07-23-23 @ 19:00) (18 - 18)  SpO2: 96% (07-23-23 @ 19:00) (96% - 96%)    GENERAL: patient appears well, in mild distress, appropriate, pleasant  HEENT: sclera clear, no exudates; oropharynx clear without erythema, no exudates, moist mucous membranes; right forehead hematoma noted and tender to touch; orbital bones intact, no crepitus  NECK: supple, soft, no thyromegaly noted  LUNGS: good air entry bilaterally, clear to auscultation, symmetric breath sounds, no wheezing or rhonchi appreciated  HEART: soft S1/S2, regular rate and rhythm, no lower extremity edema, holosystolic murmur noted   GASTROINTESTINAL: abdomen is soft, nontender, nondistended, normoactive bowel sounds, no palpable masses  INTEGUMENT: good skin turgor, multiple ecchymoses on B/L UE and LE   MUSCULOSKELETAL: no clubbing or cyanosis, no obvious deformity; right elbow, dorsal forearm, and knee lacerations.   NEUROLOGIC: awake, alert, oriented x3, good muscle tone in 4 extremities, EOMI, no cervical tender to palpation, decreased sensation to touch on right lateral thigh and leg  PSYCHIATRIC: mood is good, affect is congruent, linear and logical thought process  HEME/LYMPH: no palpable supraclavicular nodules, T(C): 36.7 (07-23-23 @ 19:00), Max: 36.7 (07-23-23 @ 19:00)  HR: 62 (07-23-23 @ 19:00) (61 - 62)  BP: 154/56 (07-23-23 @ 19:00) (154/56 - 166/48)  RR: 18 (07-23-23 @ 19:00) (18 - 18)  SpO2: 96% (07-23-23 @ 19:00) (96% - 96%)    GENERAL: patient appears well, in mild distress, appropriate, pleasant  HEENT: sclera clear, no exudates; oropharynx clear without erythema, no exudates, moist mucous membranes; right forehead hematoma noted and tender to touch; orbital bones intact, no crepitus  NECK: supple, soft, no thyromegaly noted  LUNGS: good air entry bilaterally, clear to auscultation, symmetric breath sounds, no wheezing or rhonchi appreciated  HEART: soft S1/S2, regular rate and rhythm, no lower extremity edema, holosystolic murmur noted   GASTROINTESTINAL: abdomen is soft, nontender, nondistended, normoactive bowel sounds, no palpable masses  INTEGUMENT: good skin turgor, multiple ecchymoses on B/L UE and LE  MUSCULOSKELETAL: no clubbing or cyanosis, no obvious deformity; right elbow, dorsal forearm, and knee lacerations; right LE RROM   NEUROLOGIC: awake, alert, oriented x3, good muscle tone in 4 extremities, EOMI, no midline cervical spine TTP, decreased sensation to touch on right lateral thigh and leg  PSYCHIATRIC: mood is good, affect is congruent, linear and logical thought process  HEME/LYMPH: no palpable supraclavicular nodules, T(C): 36.7 (07-23-23 @ 19:00), Max: 36.7 (07-23-23 @ 19:00)  HR: 62 (07-23-23 @ 19:00) (61 - 62)  BP: 154/56 (07-23-23 @ 19:00) (154/56 - 166/48)  RR: 18 (07-23-23 @ 19:00) (18 - 18)  SpO2: 96% (07-23-23 @ 19:00) (96% - 96%)    GENERAL: patient appears well, in mild distress, appropriate, pleasant  HEENT: sclera clear, no exudates; oropharynx clear without erythema, no exudates, moist mucous membranes; right forehead hematoma noted and tender to touch; orbital bones intact, no crepitus  NECK: supple, soft, no thyromegaly noted  LUNGS: good air entry bilaterally, clear to auscultation, symmetric breath sounds, no wheezing or rhonchi appreciated  HEART: soft S1/S2, regular rate and rhythm, no lower extremity edema, holosystolic murmur noted   GASTROINTESTINAL: abdomen is soft, nontender, nondistended, normoactive bowel sounds, no palpable masses  INTEGUMENT: good skin turgor, multiple ecchymoses on B/L UE and LE, hematoma on RLE and forehead  MUSCULOSKELETAL: no clubbing or cyanosis; right elbow, dorsal forearm, and knee lacerations; right LE RROM, externally rotated  NEUROLOGIC: awake, alert, oriented x3, good muscle tone in 4 extremities, EOMI, no midline cervical spine TTP, decreased sensation to touch on right lateral thigh and leg  PSYCHIATRIC: mood is good, affect is congruent, linear and logical thought process  HEME/LYMPH: no palpable supraclavicular nodules,

## 2023-07-23 NOTE — H&P ADULT - NSHPSOCIALHISTORY_GEN_ALL_CORE
Tobacco: denies; quit 35 years ago; prior hx of smoking 1ppd for 30 years  EtOH: denies  Recreational drug use: denies  Lives with: patient lives with daughter and son in law at home   Ambulates: has walker at home; pt does not use it   ADLs: independent

## 2023-07-23 NOTE — ED ADULT NURSE NOTE - NSFALLHARMRISKINTERV_ED_ALL_ED
Assistance OOB with selected safe patient handling equipment if applicable/Assistance with ambulation/Communicate risk of Fall with Harm to all staff, patient, and family/Monitor gait and stability/Provide visual cue: red socks, yellow wristband, yellow gown, etc/Reinforce activity limits and safety measures with patient and family/Bed in lowest position, wheels locked, appropriate side rails in place/Call bell, personal items and telephone in reach/Instruct patient to call for assistance before getting out of bed/chair/stretcher/Non-slip footwear applied when patient is off stretcher/[place] to call system/Physically safe environment - no spills, clutter or unnecessary equipment/Purposeful Proactive Rounding/Room/bathroom lighting operational, light cord in reach

## 2023-07-23 NOTE — H&P ADULT - NSICDXPASTSURGICALHX_GEN_ALL_CORE_FT
PAST SURGICAL HISTORY:  FH: cholecystectomy     S/P hysterectomy      PAST SURGICAL HISTORY:  FH: cholecystectomy     S/P hysterectomy     Status post-operative repair of closed fracture of right hip

## 2023-07-23 NOTE — ED PROVIDER NOTE - PHYSICAL EXAMINATION
no midline C/T/L TTP  skin tears noted to the RUE and RLE   no TTP to the RUE NVI  TTP to the right hip no TTP to the right knee, tib/fib, ankle, foot.   No TTP to the LUE, LLE  no chest wall TTP no midline C/T/L TTP  skin tears noted to the RUE and RLE   no TTP to the RUE NVI  TTP to the right hip no TTP to the right knee, tib/fib, ankle, foot. There is a mild shortening of the right hip with no rotation noted. +pedal pulse, cap refill less then 2 seconds sensation grossly intact   No TTP to the LUE, LLE  no chest wall TTP

## 2023-07-23 NOTE — H&P ADULT - PROBLEM SELECTOR PLAN 1
Patient presents s/p mechanical fall; admitted for right hip fracture   CT Hip: Nondisplaced right intertrochanteric fracture with impaction laterally  - admit to [facility]   - Ortho consulted in ED, recommends OR for right hip fixation  - NPO after midnight Patient presents s/p mechanical fall; admitted for right hip fracture   CT Hip: Nondisplaced right intertrochanteric fracture with impaction laterally  - admit to [facility]   - Ortho consulted in ED, recommends OR for right hip fixation  - NPO after midnight  - Pain regimen; Tylenol 650mg q6h PRN  - Patient presents s/p mechanical fall; admitted for right hip fracture   CT head/ cervical spine: no acute abnormality  CT Hip: Nondisplaced right intertrochanteric fracture with impaction laterally  - admit to [facility]   - NPO after midnight  - Pain regimen; Tylenol 650mg q6h PRN  - Ortho consulted in ED, recommends OR Patient presents s/p mechanical fall; admitted for right hip fracture   CT head/ cervical spine: no acute abnormality  CT Hip: Nondisplaced right intertrochanteric fracture with impaction laterally  - admit to F   - NPO after midnight  - Pain regimen; Tylenol 650mg q6h PRN, dilaudid 0.5mg IVP q8h PRN for severe pain  - Ortho consulted in ED, recommends OR  - Cardio (Dr. Dodson) consulted for cardiac clearance Patient presents s/p mechanical fall; admitted for right hip fracture   CT head/ cervical spine: no acute abnormality  CT Hip: Nondisplaced right intertrochanteric fracture with impaction laterally  - admit to F   - NPO after midnight  - Pain regimen; Tylenol 650mg q6h PRN, dilaudid 0.5mg IVP q8h PRN for severe pain  - Ortho consulted in ED, recommends OR  - Labs reviewed - anemia at baseline and Cr also near baseline (CKD with baseline Cr ~1.6)  - No modifiable risk factors at present for OR - she can be considered medically optimized at a higher risk candidate for intermediate risk surgery  - Cardio (Dr. Coles) consulted for cardiac clearance

## 2023-07-23 NOTE — ED PROVIDER NOTE - DIFFERENTIAL DIAGNOSIS
Differential Diagnosis Patient presenting to the emergency room status post mechanical fall with right hip pain.  Neurovascular intact also with skin abrasions.  Differential includes but not limited to possible sprain strain versus fracture although high suspicion for fracture based on patient's current presentation.  Will obtain screening preop labs medicate as needed for pain and obtain x-ray imaging of the hip.

## 2023-07-23 NOTE — H&P ADULT - PROBLEM SELECTOR PLAN 4
- Continue home levothyroxine - Continue home levothyroxine  - Check TSH (pt reports synthroid being increased recently and feeling jittery)

## 2023-07-23 NOTE — ED ADULT NURSE NOTE - NSFALLASSISTNEEDED_ED_ALL_ED
Attend provider notified that patient converted into A.fib. Provider to round on patient this morning. No new orders at this time. Standing/Walking/Toileting/Moving from bed to stretcher

## 2023-07-23 NOTE — H&P ADULT - ASSESSMENT
89y female with PMHx of multiple falls, HFpEF (EF 60%), hypertension, hypothyroidism, depression, anxiety admitted with nondisplaced right intertrochanteric fracture s/p mechanical fall. 89y female with PMHx of multiple falls, HFpEF (EF 60%), hypertension, hypothyroidism, depression, anxiety admitted with nondisplaced right intertrochanteric fracture s/p mechanical fall. Admitted for right hip fracture.  89y female with PMHx of multiple falls, HFpEF (EF 60%), hypertension, hypothyroidism, depression, anxiety admitted with nondisplaced right intertrochanteric fracture s/p mechanical fall. Planning for OR for right hip IMN.

## 2023-07-23 NOTE — H&P ADULT - PROBLEM SELECTOR PLAN 2
Pt. with recurrent falls at home; 3 falls this week, refuses to use walker, low PO intake, increased stressors at home  - fall precautions, bed alarm, chair alarm  - PT eval   - Social work eval  - Nutrition consult Pt. with recurrent falls at home; 3 falls this week, refuses to use walker, low PO intake, increased stressors at home  - fall precautions, bed alarm, chair alarm  - IVF: NS 50cc/hr for 12 hours  - PT eval   - Social work eval  - Nutrition consult

## 2023-07-23 NOTE — ED PROVIDER NOTE - OBJECTIVE STATEMENT
Patient is a 89-year-old female presents to the emergency room with a chief complaint of hip pain following fall.  Past medical history of frequent falls, history of hypertension, hypothyroidism, depression, anxiety.  Patient reports that she was walking with an empty T cup in her hand when she lost her balance and fell forwards onto her right side.  She is post to ambulate with a walker but does not always use this.  Denies LOC is unsure if she hit her head.  Did land on her right side sustaining skin tears to the right arm lower extremity but has had severe hip pain since.  Patient was found by her son-in-law to be on the ground for approximately 5 minutes.  She was helped up by her son-in-law but is not been able to bear weight on her right hip since.  Denies extremity numbness.  Denies loss of bowel or bladder control.  Denies any chest pain shortness of breath or abdominal pain.  Of note patient has had a prior fracture of the left hip status postrepair.

## 2023-07-24 NOTE — CONSULT NOTE ADULT - SUBJECTIVE AND OBJECTIVE BOX
[facility]  INFECTIOUS DISEASES - [address], [place] [address]  Tel: [phone]     Fax: [phone]  =======================================================    [facility]-[id number]  JORGE JORDI     CC: Patient is a 89y old  Female who presents with a chief complaint of Fracture of unspecified part of neck of unspecified femur, initial encounter for closed fracture    HPI:  89y female with PMHx of multiple falls, HFpEF (EF 60%), hypertension, hypothyroidism, depression, anxiety, who presented to the ED for the evaluation of right hip pain s/p mechanical fall. Daughter at bedside. She fell to the right side yesterday, but son in law reported multiple falls this week. Found to have R hip fracture. She reports hip pain but no obvious new complaints. Denies any fevers, chills, night sweats, chest pain. Has occasional cough or "tickle in throat" while swallowing, but no actual cough or sputum production. Daughter says that over the past year patient with poor appetite and lost weight. She was a former smoker but stopped smoking about 36 years.        PAST MEDICAL & SURGICAL HISTORY:  Hypothyroid      HTN (hypertension)      HF (heart failure)      Anxiety and depression      S/P hysterectomy      FH: cholecystectomy      Status post-operative repair of closed fracture of right hip          Social Hx:     FAMILY HISTORY:  FH: heart disease (Father)    FH: heart disease (Mother)    FH: lung cancer (Sibling)        Allergies    sulfa drugs (Nausea)    Intolerances    codeine (Nausea)      Antibiotics:  MEDICATIONS  (STANDING):  levothyroxine 75 MICROGram(s) Oral daily  metoprolol succinate ER 50 milliGRAM(s) Oral daily  pantoprazole    Tablet 40 milliGRAM(s) Oral before breakfast  senna 2 Tablet(s) Oral at bedtime    MEDICATIONS  (PRN):  acetaminophen     Tablet .. 650 milliGRAM(s) Oral every 6 hours PRN Mild Pain (1 - 3)  HYDROmorphone  Injectable 0.5 milliGRAM(s) IV Push every 4 hours PRN Severe Pain (7 - 10)  melatonin 3 milliGRAM(s) Oral at bedtime PRN Insomnia  traMADol 25 milliGRAM(s) Oral every 4 hours PRN Moderate Pain (4 - 6)       REVIEW OF SYSTEMS:  CONSTITUTIONAL:  No Fever or chills  CARDIOVASCULAR:  No chest pain  RESPIRATORY:  see history  GASTROINTESTINAL:  No nausea, vomiting or diarrhea. no abdominal pain  GENITOURINARY:  No dysuria  MUSCULOSKELETAL:  no back pain  NEUROLOGIC:  No headache or dizziness      Physical Exam:  Vital Signs Last 24 Hrs  T(C): 36.4 (24 Jul 2023 12:05), Max: 36.7 (23 Jul 2023 19:00)  T(F): 97.5 (24 Jul 2023 12:05), Max: 98.1 (24 Jul 2023 00:45)  HR: 63 (24 Jul 2023 12:05) (58 - 69)  BP: 163/53 (24 Jul 2023 12:05) (135/62 - 166/48)  BP(mean): --  RR: 20 (24 Jul 2023 12:05) (18 - 20)  SpO2: 93% (24 Jul 2023 12:05) (93% - 97%)    Parameters below as of 24 Jul 2023 12:05  Patient On (Oxygen Delivery Method): room air        Weight (kg): 43.1 (07-23 @ 17:50)  GEN: NAD  HEENT: normocephalic and atraumatic.   NECK: Supple.   LUNGS: Clear to auscultation.  HEART: Regular rate and rhythm   ABDOMEN: Soft, nontender, and nondistended.    EXTREMITIES: No leg edema.  NEUROLOGIC: Answering simple questions  PSYCHIATRIC: Appropriate affect .      Labs:  07-24    144  |  117<H>  |  31<H>  ----------------------------<  82  3.6   |  25  |  1.30    Ca    6.8<L>      24 Jul 2023 08:10  Phos  2.9     07-24  Mg     1.7     07-24    TPro  5.6<L>  /  Alb  2.5<L>  /  TBili  0.3  /  DBili  x   /  AST  16  /  ALT  17  /  AlkPhos  47  07-24                          7.4    7.09  )-----------( 204      ( 24 Jul 2023 08:10 )             24.1     PT/INR - ( 24 Jul 2023 08:10 )   PT: 13.2 sec;   INR: 1.13 ratio         PTT - ( 24 Jul 2023 08:10 )  PTT:29.3 sec  Urinalysis Basic - ( 24 Jul 2023 08:10 )    Color: x / Appearance: x / SG: x / pH: x  Gluc: 82 mg/dL / Ketone: x  / Bili: x / Urobili: x   Blood: x / Protein: x / Nitrite: x   Leuk Esterase: x / RBC: x / WBC x   Sq Epi: x / Non Sq Epi: x / Bacteria: x      LIVER FUNCTIONS - ( 24 Jul 2023 08:10 )  Alb: 2.5 g/dL / Pro: 5.6 g/dL / ALK PHOS: 47 U/L / ALT: 17 U/L / AST: 16 U/L / GGT: x           CARDIAC MARKERS ( 24 Jul 2023 08:10 )  x     / x     / x     / x     / 1.7 ng/mL              SARS-CoV-2: NotDetec (07-24-23 @ 10:00)      RECENT CULTURES:  07-24 @ 10:00          NotDetec        All imaging and other data have been reviewed.    < from: CT Chest No Cont (07.24.23 @ 09:27) >    FINDINGS:    LUNGS AND AIRWAYS: Patent central airways.  1.7 x 3.0 cm right upper lobe   nodular mass versus infiltrate (2:31).  PLEURA: Trace left pleural effusion. No right pleural effusion.  MEDIASTINUM AND SHANNAN: No lymphadenopathy.  VESSELS: Atherosclerotic aorta. Low-attenuation blood pool secondary to   anemia.  HEART: Heart size is normal. Aortic root and mitral annular   calcifications. Pericardial calcifications. No pericardial effusion.  CHEST WALL AND LOWER NECK: Within normal limits.  VISUALIZED UPPER ABDOMEN: Cholecystectomy.  BONES: Within normal limits.    IMPRESSION:  1.7 x 3.0 cm nodular right upper lobe infiltrate versus mass. Recommend   short-term three-week follow-up after treatment for pneumonia to ensure   resolution and exclude neoplasm.    Pericardial calcifications.    Other chronic findings as above.      < end of copied text >

## 2023-07-24 NOTE — DIETITIAN INITIAL EVALUATION ADULT - NSFNSPHYEXAMSKINFT_GEN_A_CORE
Pressure Injury 1: Left:, heel, Stage I  Pressure Injury 2: Right:, heel, Stage I  Pressure Injury 3: sacrum, Stage I  Pressure Injury 4: none, none  Pressure Injury 5: none, none  Pressure Injury 6: none, none  Pressure Injury 7: none, none  Pressure Injury 8: none, none  Pressure Injury 9: none, none  Pressure Injury 10: none, none  Pressure Injury 11: none, none

## 2023-07-24 NOTE — PATIENT PROFILE ADULT - INTERNATIONAL TRAVEL
Patient unable to complete phone call at this time, patient states she will return call at another time No

## 2023-07-24 NOTE — PATIENT CHOICE NOTE. - NSPTCHOICESTATE_GEN_ALL_CORE

## 2023-07-24 NOTE — DIETITIAN INITIAL EVALUATION ADULT - PROBLEM SELECTOR PLAN 5
Chronic, 154/56 on admission  - Continue home metoprolol with hold parameters  - monitor hemodynamics

## 2023-07-24 NOTE — CONSULT NOTE ADULT - CONSULT REASON
Cardiac optimization
Consulted 0600  Pt seen 0630  Problem: R hip fx
R lung infiltrate
GOC/symptom management
Right Hip Fracture

## 2023-07-24 NOTE — DIETITIAN INITIAL EVALUATION ADULT - NS FNS DIET ORDER
Diet, NPO after Midnight:      NPO Start Date: 23-Jul-2023,   NPO Start Time: 23:59  Except Medications (07-23-23 @ 22:43)  Diet, NPO after Midnight:      NPO Start Date: 23-Jul-2023,   NPO Start Time: 23:59  Except Medications (07-23-23 @ 21:40)

## 2023-07-24 NOTE — CONSULT NOTE ADULT - ASSESSMENT
89y female with PMHx of multiple falls, HFpEF (EF 60%), hypertension, hypothyroidism, depression, anxiety, who presented to the ED for the evaluation of right hip pain s/p mechanical fall. Found to have R hip fracture. Incidentally found to have nodular RUL infiltrate vs mass.     Unclear if this is infectious or not in etiology. Has prior smoking hx. Clinically no obvious signs/symptoms of pneumonia. Low risk for TB based on history. No fevers and on room air. Initially with mild leukocytosis, now resolved.    -observe off antibiotics  -consider Pulmonary evaluation, check for serum procalcitonin and Quantiferon if consistent with GOC  -discussed with daughter at bedside    Thank you for courtesy of this consult.     Discussed with Dr. Erich Perez MD  Division of Infectious Diseases   Cell [phone] between 8am and 6pm   After 6pm and weekends please call ID service at [phone].    89y female with PMHx of multiple falls, HFpEF (EF 60%), hypertension, hypothyroidism, depression, anxiety, who presented to the ED for the evaluation of right hip pain s/p mechanical fall. Found to have R hip fracture. Incidentally found to have nodular RUL infiltrate vs mass.     Unclear if this is infectious or not in etiology. Has prior smoking hx. Clinically no obvious signs/symptoms of pneumonia. Low risk for TB based on history. No fevers and on room air. Initially with mild leukocytosis, now resolved.    -observe off antibiotics  -consider Pulmonary evaluation, check for serum procalcitonin and Quantiferon if consistent with GOC  -discussed with daughter at bedside    Thank you for courtesy of this consult.     Discussed with Dr. Erich Perez MD  Division of Infectious Diseases   Cell [phone] between 8am and 6pm   After 6pm and weekends please call ID service at [phone].       Addendum:  Discussed with Dr. Jung on 7/24 and 7/25, no plan for further lab draws or further evaluation of lung infiltrate. Will sign off, please call ID if any further questions. Thank you.

## 2023-07-24 NOTE — SOCIAL WORK PROGRESS NOTE - NSSWPROGRESSNOTE_GEN_ALL_CORE
SW spoke with [facility] and they are able to accommodate pt for REKHA with comfort care. Pt pending 3d stay. SW to follow and remain available for any needs.

## 2023-07-24 NOTE — GOALS OF CARE CONVERSATION - ADVANCED CARE PLANNING - TREATMENT GUIDELINES
DNI/DNR Order/Comfort measures only/Do not re-hospitalize/No blood draws/No artificial nutrition/No IV fluids

## 2023-07-24 NOTE — CONSULT NOTE ADULT - SUBJECTIVE AND OBJECTIVE BOX
Pt is a 89F PMH HFpEF (EF 60%), HTN, HypoTh, L Hip IMN ([facility], '22) who presents to [place] ED for R hip pain. She lost her balance and fell yesterday in her home, hitting her R hip, R knee, R arm, and head. Denies LOC. Could not ambulate after and had numbness in her lateral thigh and was brought to ED. At baseline, ambulates independently but has a walker at home. Has had multiple recent falls this week. Denies fevers, chills, CP, SOB, urinary/bowel incontinence.    Vital Signs (24 Hrs):  T(C): 36.7 (07-24-23 @ 04:28), Max: 36.7 (07-23-23 @ 19:00)  HR: 69 (07-24-23 @ 04:28) (61 - 69)  BP: 149/69 (07-24-23 @ 04:28) (149/69 - 166/48)  RR: 18 (07-24-23 @ 04:28) (18 - 18)  SpO2: 97% (07-24-23 @ 04:28) (95% - 97%)  Wt(kg): --    LABS:                          9.8    11.99 )-----------( 321      ( 23 Jul 2023 18:20 )             31.4     07-23    142  |  112<H>  |  38<H>  ----------------------------<  95  4.2   |  26  |  1.70<H>    Ca    8.3<L>      23 Jul 2023 18:20    TPro  7.5  /  Alb  3.3  /  TBili  0.3  /  DBili  x   /  AST  22  /  ALT  24  /  AlkPhos  61  07-23    LIVER FUNCTIONS - ( 23 Jul 2023 18:20 )  Alb: 3.3 g/dL / Pro: 7.5 g/dL / ALK PHOS: 61 U/L / ALT: 24 U/L / AST: 22 U/L / GGT: x           PT/INR - ( 23 Jul 2023 18:20 )   PT: 12.1 sec;   INR: 1.03 ratio         PTT - ( 23 Jul 2023 18:20 )  PTT:29.1 sec    Physical Exam:  General: NAD, pt laying in bed comfortably  SCDs in place  Compartments soft, compressible  Calves nontender  Bandage wraps on R wrist, R elbow    RLE:  TTP R hip  Motor: +GSC, TA, EHL, FHL  SILT SPN, DPN, Tib, Saph, Zev  +DP    Secondary Assessment:  NC/AT, NTTP of clavicles, NTTP of C-,T-,Spine, NTTP of Pelvis, could not assess L-Spine due to hip pain  UEs: NTTP of Shoulders, Elbows, Wrists, Hands; NT with AROM/PROM of Shoulders, Elbows, Wrists, Hands; AIN/PIN/Med/Uln/Msc/Rad/Ax intact  LLE: Able to SLR, NT with Log Roll, NT with Heel Strike, NTTP of Hip, Knee, Ankle, Foot; NT with AROM/PROM of Hip, Knee, Ankle, Foot; Q/H/Gsc/TA/EHL/FHL intact    Imaging:  CT R Hip: R IT Fx    A/P: 89F who presents with R IT fx    Plan for OR later today with Dr. Winston  NWB RLE  Hold chemical DVT, SCDs in place  NPO  Analgesics  Appreciate medicine recs  Discussed plan with Dr Winston who agrees with above   Pt is a 89F PMH HFpEF (EF 60%), HTN, HypoTh, L Hip IMN ([facility], '22) who presents to [place] ED for R hip pain. She lost her balance and fell yesterday in her home, hitting her R hip, R knee, R arm, and head. Denies LOC. Could not ambulate after and had numbness in her lateral thigh and was brought to ED. At baseline, ambulates independently but has a walker at home. Has had multiple recent falls this week. Denies any blood thinners or anticoagulants at home. Denies fevers, chills, CP, SOB, urinary/bowel incontinence.    Vital Signs (24 Hrs):  T(C): 36.7 (07-24-23 @ 04:28), Max: 36.7 (07-23-23 @ 19:00)  HR: 69 (07-24-23 @ 04:28) (61 - 69)  BP: 149/69 (07-24-23 @ 04:28) (149/69 - 166/48)  RR: 18 (07-24-23 @ 04:28) (18 - 18)  SpO2: 97% (07-24-23 @ 04:28) (95% - 97%)  Wt(kg): --    LABS:                          9.8    11.99 )-----------( 321      ( 23 Jul 2023 18:20 )             31.4     07-23    142  |  112<H>  |  38<H>  ----------------------------<  95  4.2   |  26  |  1.70<H>    Ca    8.3<L>      23 Jul 2023 18:20    TPro  7.5  /  Alb  3.3  /  TBili  0.3  /  DBili  x   /  AST  22  /  ALT  24  /  AlkPhos  61  07-23    LIVER FUNCTIONS - ( 23 Jul 2023 18:20 )  Alb: 3.3 g/dL / Pro: 7.5 g/dL / ALK PHOS: 61 U/L / ALT: 24 U/L / AST: 22 U/L / GGT: x           PT/INR - ( 23 Jul 2023 18:20 )   PT: 12.1 sec;   INR: 1.03 ratio         PTT - ( 23 Jul 2023 18:20 )  PTT:29.1 sec    Physical Exam:  General: NAD, pt laying in bed comfortably        RLE:  Superficial abrasions by R knee covered in dressing; otherwise no open skin  TTP R hip, nowhere else along RLE  Motor: +GSC, TA, EHL, FHL  SILT SPN, DPN, Tib, Saph, Zev  +DP  SCDs in place  Compartments soft, compressible  Calves nontender    Secondary Assessment:  Abrasion by forehead, covered in dressing; NTTP of clavicles, NTTP of C-,T-,L Spine, NTTP of Pelvis  UEs: Superficial abrasion by R elbow, covered in dressings; NTTP of Shoulders, Elbows, Wrists, Hands; NT with AROM/PROM of Shoulders, Elbows, Wrists, Hands; AIN/PIN/Med/Uln/Msc/Rad/Ax intact  LLE: Able to SLR, NT with Log Roll, NT with Heel Strike, NTTP of Hip, Knee, Ankle, Foot; NT with AROM/PROM of Hip, Knee, Ankle, Foot; Q/H/Gsc/TA/EHL/FHL intact    Imaging:  CT R Hip: R IT Fx    A/P: 89F who presents with R IT fx s/p mechanical fall    Plan for OR later today with Dr. Winston  Hold chemical DVT, SCDs in place  NPO  Please document necessary medical optimizations for OR  Recommending 2 units of blood preoperatively given drop in HH to 7.4/24.1  NWB  Analgesics  Appreciate medicine recs  Discussed plan with Dr Winston who agrees with above

## 2023-07-24 NOTE — CARE COORDINATION ASSESSMENT. - NSCAREPROVIDERS_GEN_ALL_CORE_FT
CARE PROVIDERS:  Accepting Physician: Piyush Santillan  Administration: Ilir Burns  Administration: Jose Duffy  Admitting: Piyush Santillan  Attending: Piyush Santillan  Case Management: Corey Jane  Consultant: Louis Coles  Consultant: Nicole Jauregui  Consultant: Weil, Patricia  Consultant: Louis Sierra  Consultant: Adam Ramos  Consultant: Kim Carroll  Consultant: Mala Yates  Consultant: Mariaelena Juarez  Covering Team: Malick Llanos  Covering Team: Piyush Carroll  ED Attending: Cheri Bernard  ED Nurse: Khloe Vazquez  ED Nurse 2: Lisseth Maldonado  Nurse: Memo Shin  Nurse: Christy Peraza  Nurse: Aisha Mcgovern  Nurse: Deborah Yi  Ordered: Doctor, Unknown  Ordered: ADM, User  Override: Deborah Yi  PCA/Nursing Assistant: Lauro Tellez  Primary Team: Cheko Briggs  Primary Team: Santhosh Jung  Registered Dietitian: Linda Winters  Respiratory Therapy: Saran Cerrato  Respiratory Therapy: Marina Bazzi  : Elva Smith  Speech Pathology: Brinda Rees  UR// Supp. Assoc.: Amish Pruitt  UR// Supp. Assoc.: Mariaelena Severino

## 2023-07-24 NOTE — DIETITIAN INITIAL EVALUATION ADULT - PROBLEM SELECTOR PLAN 1
Patient presents s/p mechanical fall; admitted for right hip fracture   CT head/ cervical spine: no acute abnormality  CT Hip: Nondisplaced right intertrochanteric fracture with impaction laterally  - admit to F   - NPO after midnight  - Pain regimen; Tylenol 650mg q6h PRN, dilaudid 0.5mg IVP q8h PRN for severe pain  - Ortho consulted in ED, recommends OR  - Labs reviewed - anemia at baseline and Cr also near baseline (CKD with baseline Cr ~1.6)  - No modifiable risk factors at present for OR - she can be considered medically optimized at a higher risk candidate for intermediate risk surgery  - Cardio (Dr. Coles) consulted for cardiac clearance

## 2023-07-24 NOTE — DIETITIAN INITIAL EVALUATION ADULT - PERTINENT MEDS FT
MEDICATIONS  (STANDING):  dextrose 5% + sodium chloride 0.9%. 1000 milliLiter(s) (50 mL/Hr) IV Continuous <Continuous>  furosemide   Injectable 40 milliGRAM(s) IV Push once  levothyroxine 75 MICROGram(s) Oral daily  magnesium sulfate  IVPB 1 Gram(s) IV Intermittent once  metoprolol succinate ER 50 milliGRAM(s) Oral daily  pantoprazole    Tablet 40 milliGRAM(s) Oral before breakfast  potassium chloride  10 mEq/100 mL IVPB 10 milliEquivalent(s) IV Intermittent every 1 hour  potassium phosphate IVPB 15 milliMole(s) IV Intermittent once  senna 2 Tablet(s) Oral at bedtime    MEDICATIONS  (PRN):  acetaminophen     Tablet .. 650 milliGRAM(s) Oral every 6 hours PRN Mild Pain (1 - 3)  HYDROmorphone  Injectable 0.5 milliGRAM(s) IV Push every 4 hours PRN Severe Pain (7 - 10)  melatonin 3 milliGRAM(s) Oral at bedtime PRN Insomnia  traMADol 25 milliGRAM(s) Oral every 4 hours PRN Moderate Pain (4 - 6)

## 2023-07-24 NOTE — CONSULT NOTE ADULT - SUBJECTIVE AND OBJECTIVE BOX
HPI:  89y female with PMHx of multiple falls, HFpEF (EF 60%), hypertension, hypothyroidism, depression, anxiety presented to the ED for the evaluation of right hip pain s/p mechanical fall. Kwame (son in law) present at bedside. Pt. states that around 4pm today she was walking with an empty tea cup in her hand and fell down forwards onto the right side; hit her head, right arm, hip, and knee and was bleeding. Pt. fell onto wood floor and was down for 5 minutes before son in law found her and picked her up. Pt reports not being able bear weight on her legs and right hip pain on movement, sharp, rated as 6/10 a/w numbness on her lateral right thigh. Pt reports headache, weakness and fatigue; reports recent increased stress d/t living with daughter and son in law. Son in law reports multiple falls this week, pt fell 2 days ago and 4 days ago; also reports decreased PO intake over the past few months - pt. eats a roll in the morning with tea and handful of carbs or protein at night. States that she is in pain despite IV morphine and tylenol.    Pt is DNR/DNI; MOLST form has been signed previously.     Denies fever, chills, chest pain, palpitations, SOB, cough, abdominal pain, nausea, vomiting, diarrhea, constipation, hematochezia, melena, urinary frequency, urgency, dysuria, hematuria, changes in vision, dizziness. No other complaints at this time.    ED course:  Vitals: BP: 154/56, HR: 62, Temp: 98F, RR: 18, SpO2: 96% on RA  Labs significant for: Hgb at baseline 9.8, Cr at baseline 1.70, eGFR 27  EKG: NSR, prolonged QTc 499, HR 68  In ED given: Ofirmev 650mg IV x1, Morphine 2mg IV x1   CT head: No acute abnormality. Chronic changes as above.  CT cervical spine: No acute abnormality. Chronic changes as above. There is partial visualization of an airspace consolidation in the right upper lobe, for which a dedicated CT chest may be helpful for further evaluation.  CT hip: Nondisplaced right intertrochanteric fracture with impaction laterally. (23 Jul 2023 21:05)    PERTINENT PM/SXH:   Hypothyroid    HTN (hypertension)    HF (heart failure)    Anxiety and depression      No significant past surgical history    S/P hysterectomy    FH: cholecystectomy    Status post-operative repair of closed fracture of right hip      FAMILY HISTORY:  FH: heart disease (Father)    FH: heart disease (Mother)    FH: lung cancer (Sibling)      ITEMS NOT CHECKED ARE NOT PRESENT    SOCIAL HISTORY:   Significant other/partner[ ]  Children[ ]  Uatsdin/Spirituality:  Substance hx:  [ ]   Tobacco hx:  [ ]   Alcohol hx: [ ] (x) none  Home Opioid hx:  [ ] I-Stop Reference No:  Living Situation: [ ]Home  [ ]Long term care  [ ]Rehab [ ]Other    ADVANCE DIRECTIVES:    DNR  MOLST  [x ]  Living Will  [ ]   DECISION MAKER(s):  [ ] Health Care Proxy(s)  [ ] Surrogate(s)  [ ] Guardian           Name(s): Phone Number(s):    BASELINE (I)ADL(s) (prior to admission):  [place]: [ ]Total  [ ] Moderate [x ]Dependent    Allergies    sulfa drugs (Nausea)    Intolerances    codeine (Nausea)  MEDICATIONS  (STANDING):  citalopram 20 milliGRAM(s) Oral daily  levothyroxine 75 MICROGram(s) Oral daily  metoprolol succinate ER 50 milliGRAM(s) Oral daily  pantoprazole    Tablet 40 milliGRAM(s) Oral before breakfast  senna 2 Tablet(s) Oral at bedtime    MEDICATIONS  (PRN):  acetaminophen     Tablet .. 650 milliGRAM(s) Oral every 6 hours PRN Mild Pain (1 - 3)  HYDROmorphone  Injectable 0.25 milliGRAM(s) IV Push every 4 hours PRN Moderate Pain (4 - 6)  HYDROmorphone  Injectable 0.5 milliGRAM(s) IV Push every 4 hours PRN Severe Pain (7 - 10)  melatonin 3 milliGRAM(s) Oral at bedtime PRN Insomnia    PRESENT SYMPTOMS: [ ]Unable to obtain due to poor mentation   Source if other than patient:  [ ]Family   [ ]Team     Pain: [ x]yes [ ]no  QOL impact -   Location -                    Aggravating factors -  Quality -  Radiation -  Timing-  Severity (0-10 scale):  Minimal acceptable level (0-10 scale):     CPOT:    https://www.[facility].org/getattachment/[email or web address]-[email or web address]/Critical-Care-Pain-Observation-Tool-(CPOT)      PAIN AD Score:     http://geriatrictoolkit.[email or web address]/cog/painad.pdf (press ctrl +  left click to view)    Dyspnea:                           [ ]Mild [ ]Moderate [ ]Severe  Anxiety:                             [ ]Mild [ ]Moderate [ ]Severe  Fatigue:                             [ ]Mild [ ]Moderate [ ]Severe  Nausea:                             [ ]Mild [ ]Moderate [ ]Severe  Loss of appetite:              [ ]Mild [ ]Moderate [ ]Severe  Constipation:                    [ ]Mild [ ]Moderate [ ]Severe    Other Symptoms:  [ x]All other review of systems negative     Palliative Performance Status Version 2:         %    http://[facility].org/files/news/palliative_performance_scale_ppsv2.pdf  PHYSICAL EXAM:  Vital Signs Last 24 Hrs  T(C): 36.4 (24 Jul 2023 12:05), Max: 36.7 (23 Jul 2023 19:00)  T(F): 97.5 (24 Jul 2023 12:05), Max: 98.1 (24 Jul 2023 00:45)  HR: 62 (24 Jul 2023 13:27) (58 - 69)  BP: 105/43 (24 Jul 2023 13:27) (105/43 - 166/48)  BP(mean): --  RR: 18 (24 Jul 2023 13:27) (18 - 20)  SpO2: 94% (24 Jul 2023 13:27) (93% - 97%)    Parameters below as of 24 Jul 2023 13:27  Patient On (Oxygen Delivery Method): room air     I&O's Summary    23 Jul 2023 07:01  -  24 Jul 2023 07:00  --------------------------------------------------------  IN: 350 mL / OUT: 0 mL / NET: 350 mL      GENERAL:  [ ]Alert  [ ]Oriented x   [ ]Lethargic  [ ]Cachexia  [ ]Unarousable  [ ]Verbal  [ ]Non-Verbal  Behavioral:   [ ] Anxiety  [ ] Delirium [ ] Agitation [ ] Other  HEENT:  [ ]Normal   [ ]Dry mouth   [ ]ET Tube/Trach  [ ]Oral lesions  PULMONARY:   [ ]Clear [ ]Tachypnea  [ ]Audible excessive secretions   [ ]Rhonchi        [ ]Right [ ]Left [ ]Bilateral  [ ]Crackles        [ ]Right [ ]Left [ ]Bilateral  [ ]Wheezing     [ ]Right [ ]Left [ ]Bilateral  [ ]Diminished breath sounds [ ]right [ ]left [ ]bilateral  CARDIOVASCULAR:    [ ]Regular [ ]Irregular [ ]Tachy  [ ]Hao [ ]Murmur [ ]Other  GASTROINTESTINAL:  [ ]Soft  [ ]Distended   [ ]+BS  [ ]Non tender [ ]Tender  [ ]PEG [ ]OGT/ NGT  Last BM:   GENITOURINARY:  [ ]Normal [ ] Incontinent   [ ]Oliguria/Anuria   [ ]Rubin  MUSCULOSKELETAL:   [ ]Normal   [ ]Weakness  [ ]Bed/Wheelchair bound [ ]Edema  NEUROLOGIC:   [ ]No focal deficits  [ ]Cognitive impairment  [ ]Dysphagia [ ]Dysarthria [ ]Paresis [ ]Other   SKIN:   [ ]Normal    [ ]Rash  [ ]Pressure ulcer(s)       Present on admission [ ]y [ ]n    CRITICAL CARE:  [ ] Shock Present  [ ]Septic [ ]Cardiogenic [ ]Neurologic [ ]Hypovolemic  [ ]  Vasopressors [ ]  Inotropes   [ ]Respiratory failure present [ ]Mechanical ventilation [ ]Non-invasive ventilatory support [ ]High flow    [ ]Acute  [ ]Chronic [ ]Hypoxic  [ ]Hypercarbic [ ]Other  [ ]Other organ failure     LABS:                        7.4    7.09  )-----------( 204      ( 24 Jul 2023 08:10 )             24.1   07-24    144  |  117<H>  |  31<H>  ----------------------------<  82  3.6   |  25  |  1.30    Ca    6.8<L>      24 Jul 2023 08:10  Phos  2.9     07-24  Mg     1.7     07-24    TPro  5.6<L>  /  Alb  2.5<L>  /  TBili  0.3  /  DBili  x   /  AST  16  /  ALT  17  /  AlkPhos  47  07-24  PT/INR - ( 24 Jul 2023 08:10 )   PT: 13.2 sec;   INR: 1.13 ratio         PTT - ( 24 Jul 2023 08:10 )  PTT:29.3 sec    Urinalysis Basic - ( 24 Jul 2023 08:10 )    Color: x / Appearance: x / SG: x / pH: x  Gluc: 82 mg/dL / Ketone: x  / Bili: x / Urobili: x   Blood: x / Protein: x / Nitrite: x   Leuk Esterase: x / RBC: x / WBC x   Sq Epi: x / Non Sq Epi: x / Bacteria: x      RADIOLOGY & ADDITIONAL STUDIES:    PROTEIN CALORIE MALNUTRITION PRESENT: [ ]mild [ ]moderate [ ]severe [ ]underweight [ ]morbid obesity  https://www.andeal.org/vault/2440/web/files/ONC/Table_Clinical%20Characteristics%20to%20Document%20Malnutrition-White%20JV%20et%20al%730007.pdf      Weight (kg): 43.1 (07-23-23 @ 17:50)    [ ]PPSV2 < or = to 30% [ ]significant weight loss  [ ]poor nutritional intake  [ ]anasarca      [ ]Artificial Nutrition      REFERRALS:   [ ]Chaplaincy  [ ]Hospice  [ ]Child Life  [ ]Social Work  [ ]Case management [ ]Holistic Therapy     Goals of Care Document:

## 2023-07-24 NOTE — DIETITIAN INITIAL EVALUATION ADULT - OTHER INFO
89 y/o female adm with right hip fx. PMH anxiety and depression, HF, HTN, hypothyroidism. Pt lives with her daughter and son in law. Decreased appetite noted over the past few months. Unsure of wt loss (as per pt's chart history). Pt weighed 68# at one point.  Family a little upset at this time. Son in law just alerted the staff that pt does not want to have surgery today and is looking for palliative care. Complete physical assessment is not appropriate. Pt still NPO. SLP eval pending.

## 2023-07-24 NOTE — PATIENT PROFILE ADULT - TRANSPORTATION
Rosalba Escobedo (: 1959) is a 58 y.o. male, patient,here for evaluation of the following   Chief Complaint   Patient presents with    Leg Pain     left lower leg injury, stepped through floor 9 days ago, seen at [facility] on 21 where x-rays of his ankle and tibia were taken. he was placed on an antibiotic and has been nonweightbearing since the injury    Ankle Pain        ASSESSMENT/PLAN:  Below is the assessment and plan developed based on review of pertinent history, physical exam, labs, studies, and medications. 1. Left leg injury, initial encounter  2. Contusion of left leg, initial encounter      Patient is informed likely deep contusion, no fractures are seen on the x-rays seen however hairline fractures can present later so when he returns in approximately 6 weeks we will get new x-rays of the left ankle 3 views nonweightbearing and the left tibia 2 views nonweightbearing. In the meantime activity modification, immobilization in the boot he currently has available. Anti-inflammatory medications and over-the-counter pain medications. He will complete the antibiotic treatment that was implemented for the wound and local wound care recommended. Return in about 4 weeks (around 2022) for repeat xrays. No Known Allergies    Current Outpatient Medications   Medication Sig    cephALEXin (KEFLEX) 250 mg capsule     trimethoprim-sulfamethoxazole (BACTRIM DS, SEPTRA DS) 160-800 mg per tablet      No current facility-administered medications for this visit. No past medical history on file. No past surgical history on file. No family history on file.     Social History     Socioeconomic History    Marital status:      Spouse name: Not on file    Number of children: Not on file    Years of education: Not on file    Highest education level: Not on file   Occupational History    Not on file   Tobacco Use    Smoking status: Never Smoker    Smokeless tobacco: Never Used   Vaping Use    Vaping Use: Never used   Substance and Sexual Activity    Alcohol use: Not Currently    Drug use: Never    Sexual activity: Not on file   Other Topics Concern    Not on file   Social History Narrative    Not on file     Social Determinants of Health     Financial Resource Strain:     Difficulty of Paying Living Expenses: Not on file   Food Insecurity:     Worried About Running Out of Food in the Last Year: Not on file    Flavio of Food in the Last Year: Not on file   Transportation Needs:     Lack of Transportation (Medical): Not on file    Lack of Transportation (Non-Medical): Not on file   Physical Activity:     Days of Exercise per Week: Not on file    Minutes of Exercise per Session: Not on file   Stress:     Feeling of Stress : Not on file   Social Connections:     Frequency of Communication with Friends and Family: Not on file    Frequency of Social Gatherings with Friends and Family: Not on file    Attends Congregation Services: Not on file    Active Member of [address] [place] [address] or Organizations: Not on file    Attends Club or Organization Meetings: Not on file    Marital Status: Not on file   Intimate Partner Violence:     Fear of Current or Ex-Partner: Not on file    Emotionally Abused: Not on file    Physically Abused: Not on file    Sexually Abused: Not on file   Housing Stability:     Unable to Pay for Housing in the Last Year: Not on file    Number of [place] in the Last Year: Not on file    Unstable Housing in the Last Year: Not on file           Vitals:  Ht 6' 1\" (1.854 m)   Wt 220 lb (99.8 kg)   BMI 29.03 kg/m²    Body mass index is 29.03 kg/m². SUBJECTIVE/OBJECTIVE:  Ioana Gill (: 1959)   new patient presents today with complaint of left ankle and leg pain started about 9 days ago after an injury on 2021. He states he was out in his garden shed and stepped wrong and a very heavy wood hit his leg.   Since that time he has been having some pain. He has severe stabbing throbbing pain that is associated with swelling, bruising, tingling weakness sensation. And every activity makes it worse. He has tried ice and elevation and had antibiotics and tetanus for an open wound at site of injury at the leg. X-rays obtained did not show a fracture. He is not diabetic, non-smoker. [facility], which is a week after injury, he felt it would get better but was not getting better so [facility] to [facility].      Physical Exam  Pleasant well-nourished male , alert and oriented to person, time and place, no acute distress. Antalgic gait, satisfactory weightbearing stance. Left lower extremity/ankle: There is a skin abrasion which is mostly superficial appears to be healing, no signs of infection. There is diffuse tenderness to palpation around the lateral fibula and tibia. The ankle joint itself is not severely tender, no joint effusions, tendons are intact including the Achilles tendon with negative Styles test, negative ankle squeeze test.    Left foot: Nontender, no swelling, ligament stable. Able to flex and extend toes satisfactory range of motion strength. Contralateral foot and ankle exam, nontender, no swelling ligaments grossly stable. Normal weightbearing stance. Neurovascular exam intact for light touch sensation, cap refill, dorsalis pedis pulse palpable, flexion/extension strength 5/5. Skin intact without open wounds, lesions or ulcers, no skin discolorations, normal warmth to skin. Imaging:    X-rays were not obtained today but an x-ray from outside facility were obtained and it does not show an obvious fracture or dislocation. X-rays reviewed are left ankle 3 views nonweightbearing x-rays and left tibia/fibula 2 views nonweightbearing. An electronic signature was used to authenticate this note.   -- Julia Thomas MD no

## 2023-07-24 NOTE — DIETITIAN INITIAL EVALUATION ADULT - REASON
pt and family just made the decision to not have surgery and would like palliative care.   not appropriate time for a physical assessment

## 2023-07-24 NOTE — DIETITIAN INITIAL EVALUATION ADULT - PROBLEM SELECTOR PLAN 7
DVT PPx: hold ac; possible OR in the AM; SCDs     #DISPO  - PT Eval  - Social work eval   - Nutrition consulted

## 2023-07-24 NOTE — DIETITIAN INITIAL EVALUATION ADULT - PROBLEM SELECTOR PLAN 4
- Continue home levothyroxine  - Check TSH (pt reports synthroid being increased recently and feeling jittery)

## 2023-07-24 NOTE — CAREGIVER ENGAGEMENT NOTE - CAREGIVER OUTREACH NOTES - FREE TEXT
SW spoke with pt son on unit. Per son, he spoke with pt and she is declining surgery. Per son, pt has therapist and spoke with therapist regarding feelings of not being independent and concern for continued decline. SW spoke with MD explaining pt request and palliative consult was placed by MD. SW to follow up with Palliative to coordinate d/c. SW to follow and remain available for any needs.

## 2023-07-24 NOTE — CONSULT NOTE ADULT - ASSESSMENT
89 yo F with PMHx of multiple falls, HFpEF (EF 60%), hypertension, hypothyroidism, depression, anxiety admitted with nondisplaced right intertrochanteric fracture s/p mechanical fall.     CHF  Advance dage  Intertrochanteric fracture s/p fall  Intractable pain/SOB  dilaudid prn  d/c to inpatient hospice. SW to start referral process  DNR/DNI    Appreciate consult. Discussed with the primary team.    Melinda Fox MD, [facility]-C

## 2023-07-24 NOTE — GOALS OF CARE CONVERSATION - ADVANCED CARE PLANNING - DOES PATIENT HAVE ADVANCE DIRECTIVE
Neurosurgery Progress Note    Subjective:  C/O headache.  Pain controlled.   No vision change.   No N/V  No upper/lower extremity symptoms.    Exam:  A&O x3  PERRL  EOM  No drift.  Motor intact.  Cervical spine non tender with full ROM    Pulse  Av  Min: 50  Max: 92  Resp  Av.9  Min: 8  Max: 23  Temp  Av.6 °C (97.9 °F)  Min: 36.3 °C (97.3 °F)  Max: 37 °C (98.6 °F)  SpO2  Av.8 %  Min: 90 %  Max: 100 %    No Data Recorded    Recent Labs      18   0513  08/15/18   0500  18   0445   WBC  12.1*  6.5  6.0   RBC  5.50  4.56*  4.60*   HEMOGLOBIN  16.7  14.0  14.0   HEMATOCRIT  49.2  40.7*  40.2*   MCV  89.5  89.3  87.4   MCH  30.4  30.7  30.4   MCHC  33.9  34.4  34.8   RDW  47.3  46.2  43.7   PLATELETCT  192  156*  159*   MPV  9.2  9.7  9.4     Recent Labs      18   0513  08/15/18   0500  18   0445   SODIUM  142  134*  134*   POTASSIUM  4.2  3.6  3.7   CHLORIDE  106  102  101   CO2  23  23  25   GLUCOSE  113*  125*  101*   BUN  7*  7*  5*   CREATININE  0.78  0.75  0.57   CALCIUM  8.9  8.6  8.8     Recent Labs      18   APTT  25.2   INR  1.05           Intake/Output       08/15/18 07 - 18 0659 18 07 - 18 0659       [id number] Total  6681-1965 Total       Intake    P.O.  300  480 780  --  -- --    P.O. 300 480 780 -- -- --    I.V.  498  -- 498  --  -- --    IV Volume 498 -- 498 -- -- --    Total Intake  -- -- --       Output    Urine  900  1600 2500  --  -- --    Number of Times Voided 1 x 3 x 4 x -- -- --    Urine Void (mL) (non-catheter) 900 1600 2500 -- -- --    Stool  --  -- --  --  -- --    Number of Times Stooled -- 0 x 0 x -- -- --    Total Output 900 1600 2500 -- -- --       Net I/O     -[phone] -[phone] -1222 -- -- --            Intake/Output Summary (Last 24 hours) at 18 0758  Last data filed at 18 0600   Gross per 24 hour   Intake             1278 ml   Output             2500 ml   Net            -1222 ml             • fludrocortisone  0.1 mg QAM   • sodium chloride  2 g TID WITH MEALS   • Respiratory Care per Protocol   Continuous RT   • Pharmacy Consult Request  1 Each PRN   • docusate sodium  100 mg BID   • senna-docusate  1 Tab Nightly   • senna-docusate  1 Tab Q24HRS PRN   • polyethylene glycol/lytes  1 Packet BID   • magnesium hydroxide  30 mL DAILY   • bisacodyl  10 mg Q24HRS PRN   • fleet  1 Each Once PRN   • NS   Continuous   • oxyCODONE immediate-release  5 mg Q3HRS PRN   • oxyCODONE immediate release  10 mg Q3HRS PRN   • morphine injection  2-4 mg Q3HRS PRN   • ondansetron  4 mg Q4HRS PRN   • famotidine  20 mg BID    Or   • famotidine  20 mg BID   • acetaminophen  650 mg Q4HRS PRN    Or   • acetaminophen  650 mg Q4HRS PRN   • levETIRAcetam  500 mg BID       Assessment and Plan:  Hospital day #3  SAH. No neurosurgical interventions planned  Needs Keppra 500 PO BID for total of 7 days.  Does not need f/u with Neurosurgery.  May reconsult NS as needed.         Yes

## 2023-07-24 NOTE — CONSULT NOTE ADULT - NS ATTEND AMEND GEN_ALL_CORE FT
had been planning to pursue imn  family and patient now in favor of hospice and non-operative management for her hip fx  remains at risk of decompensation

## 2023-07-24 NOTE — DIETITIAN INITIAL EVALUATION ADULT - NSICDXPASTSURGICALHX_GEN_ALL_CORE_FT
PAST SURGICAL HISTORY:  FH: cholecystectomy     S/P hysterectomy     Status post-operative repair of closed fracture of right hip

## 2023-07-24 NOTE — PATIENT PROFILE ADULT - FUNCTIONAL ASSESSMENT - BASIC MOBILITY 6.
1-calculated by average/Not able to assess (calculate score using [facility] averaging method)

## 2023-07-24 NOTE — CHART NOTE - NSCHARTNOTEFT_GEN_A_CORE
Pt and family requested further discussions on whether to proceed with non-operative versus operative management with medical, orthopedic, and palliative care teams. Discussed with patient extensively on the risks and benefits of the operative procedure and emphasized that surgery is medically recommended in her case since she was able to ambulate prior to the fall. Non-operative risks include a greater risk of blood clots, pulmonary embolisms, pulmonary atelectasis and infections, decubitus ulcers, muscle atrophy, and worse long term pain control. The benefits of surgery include regaining the ability to bear weight and ambulate shortly after the procedure, which would decrease the forementioned risks. Although surgery itself has risks such as wound infection and hemorrhage, studies have consistently shown that operative management has superior outcomes compared to non-operative management for hip fractures in most patients. The patient and family fully understood both the benefits and risks and would prefer to proceed with non-operative management at this time. The patient experienced a similar hip fracture last year and underwent surgical fixation. Although the patient says she did well from the surgery, she does not think she would fair well this time and would prefer to forgo the procedure and post-operative recovery and rehabilitation. She would rather be comfortable at this time. The family at bedside also understood and agreed to above decision. Otherwise, it is recommended to control pain when needed, NWB at this time but can undergo transfers and eventually bed to chair as tolerated starting in 2 weeks. DVT ppx and medical management per primary team. Reconsult orthopedic surgery if patient and family decide to proceed with surgical fixation. Discussed above with Dr. Winston, who agrees with plan.

## 2023-07-24 NOTE — CARE COORDINATION ASSESSMENT. - REASON FOR CONSULT
Consult for +depression - pt reports that she would like SW to speak with son. No known MH history. Will further discuss with pt post-op. Pt going today to OR for hip fx. Per son Mars (HCP/POA) pt was placed at [facility] last yr, would like pt to return on d/c due to concerns with safety - multiple steps in home to enter and get to pt bedroom / bathroom. SW to follow and remain available for any needs and d/c planning./coordination of care/discharge planning/mental health issues

## 2023-07-24 NOTE — CHART NOTE - NSCHARTNOTEFT_GEN_A_CORE
called by CM, patient requested palliative and hospice care.  patient is in the room with Son and daughter -in-law at bedside. I have explained the risks and benefit about the medical treatments and surgery,  I have suggested the benefit of  the surgery is overweight the risk and same for the blood transfusion. however patient and family still would like to have palliative care. Patient is AAOx3, she understand her past and current medical and surgical condition, benefits and risks of the treatment options. mood/insight/judgement appropriate. has h/o depression/anxiety on medication but not appears overtly depressed, no SI/HI but she does express the desire to proceed with comfort care and maintained comfortable.   Palliative and orthopedic team discussed with patient and family as well.   as per [facility] conversation: no surgery, no IV medications or IVF. no more blood draw, no blood transfusion. doesn't want to have swallow eval.  MOLST completed.

## 2023-07-24 NOTE — DIETITIAN INITIAL EVALUATION ADULT - PROBLEM SELECTOR PLAN 3
Chronic HFpEF - does not appear to be overtly volume overloaded - compensated  - Will hold loop diuretic for now while NPO and receiving IV fluids  - Daily weights, strict I's and O's

## 2023-07-24 NOTE — CARE COORDINATION ASSESSMENT. - NSPASTMEDSURGHISTORY_GEN_ALL_CORE_FT
PAST MEDICAL & SURGICAL HISTORY:  HTN (hypertension)      Hypothyroid      Anxiety and depression      HF (heart failure)      FH: cholecystectomy      S/P hysterectomy      Status post-operative repair of closed fracture of right hip

## 2023-07-24 NOTE — PATIENT PROFILE ADULT - ABILITY TO HEAR (WITH HEARING AID OR HEARING APPLIANCE IF NORMALLY USED):
has hearing aids but left at home/Mildly to Moderately Impaired: difficulty hearing in some environments or speaker may need to increase volume or speak distinctly

## 2023-07-24 NOTE — GOALS OF CARE CONVERSATION - ADVANCED CARE PLANNING - CONVERSATION DETAILS
Writer met with pt and her daughter Dot. Reviewed patient's medical and social history as well as events leading to patient's hospitalization. Writer discussed patient's current diagnosis hio fx, frequent falls, debility HF ,afib, HTN, hypothyroid ,advanced age, dysphagia, anxiety. medical condition and management,. prognosis, and life expectancy. Inquired about patient's wishes regarding extent of medical care to be provided including escalation of medical care into the ICU intubation with vent and use of vasopressor support. In addition, the writer inquired about thoughts regarding cardiopulmonary resuscitation, artificial nutrition and hydration including use of feeding tubes and IVF, antibiotics, and further investigative studies such as blood draws and radiology .pt and family  showed good insight into medical condition. Pt states she wants no aggressive treatment including CPR, intubation and refuses to have surgery to repair hip Pt states she understands consequence of no surgery and has discussed with ortho. Writer recommended DNR/DNI and hospice. Explained purpose of hospice and pt readily agrees stating she is prepared to die and does not want to suffer any longer. Pt care focus is yousuf CMO with pt understanding no blood work, blood transfusions or IV fluids and use of opioids for pain

## 2023-07-24 NOTE — DIETITIAN INITIAL EVALUATION ADULT - REASON FOR ADMISSION
Fracture of unspecified part of neck of unspecified femur, initial encounter for closed fracture     Statement Selected

## 2023-07-24 NOTE — DIETITIAN INITIAL EVALUATION ADULT - ADD RECOMMEND
SLP pending  palliative care pending  honor pt's food preferences/tolerances/consider po supplement when diet advances  will remain available.

## 2023-07-24 NOTE — DIETITIAN NUTRITION RISK NOTIFICATION - TREATMENT: THE FOLLOWING DIET HAS BEEN RECOMMENDED
Diet, Regular:   DASH/TLC {Sodium & Cholesterol Restricted}  Minced and Moist (MINCEDMOIST) (07-24-23 @ 12:47) [Active]

## 2023-07-24 NOTE — DIETITIAN INITIAL EVALUATION ADULT - PROBLEM SELECTOR PLAN 2
Pt. with recurrent falls at home; 3 falls this week, refuses to use walker, low PO intake, increased stressors at home  - fall precautions, bed alarm, chair alarm  - IVF: NS 50cc/hr for 12 hours  - PT eval   - Social work eval  - Nutrition consult

## 2023-07-24 NOTE — CASE MANAGEMENT PROGRESS NOTE - NSCMPROGRESSNOTE_GEN_ALL_CORE
Case management consult noted.  Patient is pending OR procedure today and will continue to remain available from a case management perspective.  Potential [facility] placement upon D/C>

## 2023-07-24 NOTE — CONSULT NOTE ADULT - SUBJECTIVE AND OBJECTIVE BOX
DOCUMENTATION IN PROGRESS      CHIEF COMPLAINT: Patient is a 89y old  Female who presents with a chief complaint of hip fracture (24 Jul 2023 07:04)    HPI:  89y female with PMHx of multiple falls, HFpEF (EF 60%), hypertension, hypothyroidism, depression, anxiety presented to the ED for the evaluation of right hip pain s/p mechanical fall. Kwame (son in law) present at bedside. Pt. states that around 4pm today she was walking with an empty tea cup in her hand and fell down forwards onto the right side; hit her head, right arm, hip, and knee and was bleeding. Pt. fell onto wood floor and was down for 5 minutes before son in law found her and picked her up. Pt reports not being able bear weight on her legs and right hip pain on movement, sharp, rated as 6/10 a/w numbness on her lateral right thigh. Pt reports headache, weakness and fatigue; reports recent increased stress d/t living with daughter and son in law. Son in law reports multiple falls this week, pt fell 2 days ago and 4 days ago; also reports decreased PO intake over the past few months - pt. eats a roll in the morning with tea and handful of carbs or protein at night. States that she is in pain despite IV morphine and tylenol.    Pt is DNR/DNI; MOLST form has been signed previously.     Denies fever, chills, chest pain, palpitations, SOB, cough, abdominal pain, nausea, vomiting, diarrhea, constipation, hematochezia, melena, urinary frequency, urgency, dysuria, hematuria, changes in vision, dizziness. No other complaints at this time.    ED course:  Vitals: BP: 154/56, HR: 62, Temp: 98F, RR: 18, SpO2: 96% on RA  Labs significant for: Hgb at baseline 9.8, Cr at baseline 1.70, eGFR 27  EKG: NSR, prolonged QTc 499, HR 68  In ED given: Ofirmev 650mg IV x1, Morphine 2mg IV x1   CT head: No acute abnormality. Chronic changes as above.  CT cervical spine: No acute abnormality. Chronic changes as above. There is partial visualization of an airspace consolidation in the right upper lobe, for which a dedicated CT chest may be helpful for further evaluation.  CT hip: Nondisplaced right intertrochanteric fracture with impaction laterally. (23 Jul 2023 21:05)      EKG: NSR     REVIEW OF SYSTEMS:   All other review of systems are negative unless indicated above    PAST MEDICAL & SURGICAL HISTORY:  Hypothyroid      HTN (hypertension)      HF (heart failure)      Anxiety and depression      S/P hysterectomy      FH: cholecystectomy      Status post-operative repair of closed fracture of right hip          SOCIAL HISTORY:  No tobacco, ethanol, or drug abuse.    FAMILY HISTORY:  FH: heart disease (Father)    FH: heart disease (Mother)    FH: lung cancer (Sibling)      No family history of acute MI or sudden cardiac death.    MEDICATIONS  (STANDING):  citalopram 40 milliGRAM(s) Oral daily  levothyroxine 75 MICROGram(s) Oral daily  magnesium sulfate  IVPB 1 Gram(s) IV Intermittent once  metoprolol succinate ER 50 milliGRAM(s) Oral daily  pantoprazole    Tablet 40 milliGRAM(s) Oral before breakfast  potassium chloride  10 mEq/100 mL IVPB 10 milliEquivalent(s) IV Intermittent every 1 hour  potassium phosphate IVPB 15 milliMole(s) IV Intermittent once  senna 2 Tablet(s) Oral at bedtime  sodium chloride 0.9%. 1000 milliLiter(s) (50 mL/Hr) IV Continuous <Continuous>    MEDICATIONS  (PRN):  acetaminophen     Tablet .. 650 milliGRAM(s) Oral every 6 hours PRN Mild Pain (1 - 3)  HYDROmorphone  Injectable 0.5 milliGRAM(s) IV Push every 4 hours PRN Severe Pain (7 - 10)  melatonin 3 milliGRAM(s) Oral at bedtime PRN Insomnia  traMADol 25 milliGRAM(s) Oral every 4 hours PRN Moderate Pain (4 - 6)      Allergies    sulfa drugs (Nausea)    Intolerances    codeine (Nausea)      Home meds:  Home Medications:  citalopram 40 mg oral tablet: 1 tab(s) orally once a day (23 Jul 2023 22:46)  Lasix 20 mg oral tablet: 1 tab(s) orally every other day (23 Jul 2023 22:46)  levothyroxine 75 mcg (0.075 mg) oral tablet: 1 tab(s) orally once a day (23 Jul 2023 22:46)  metoprolol succinate 50 mg oral tablet, extended release: 1 tab(s) orally once a day (23 Jul 2023 22:46)  omeprazole 20 mg oral delayed release capsule: 1 cap(s) orally once a day (23 Jul 2023 22:46)  potassium chloride 8 mEq (600 mg) oral capsule, extended release: 1 cap(s) orally every other day (23 Jul 2023 22:45)        VITAL SIGNS:   Vital Signs Last 24 Hrs  T(C): 36.7 (24 Jul 2023 04:28), Max: 36.7 (23 Jul 2023 19:00)  T(F): 98 (24 Jul 2023 04:28), Max: 98.1 (24 Jul 2023 00:45)  HR: 69 (24 Jul 2023 04:28) (61 - 69)  BP: 149/69 (24 Jul 2023 04:28) (149/69 - 166/48)  BP(mean): --  RR: 18 (24 Jul 2023 04:28) (18 - 18)  SpO2: 97% (24 Jul 2023 04:28) (95% - 97%)    Parameters below as of 23 Jul 2023 23:27  Patient On (Oxygen Delivery Method): room air        I&O's Summary    23 Jul 2023 07:01  -  24 Jul 2023 07:00  --------------------------------------------------------  IN: 350 mL / OUT: 0 mL / NET: 350 mL        On Exam:  TELE:   Constitutional: NAD, awake and alert, well-developed  HEENT: Moist Mucous Membranes, Anicteric  Pulmonary: Non-labored, breath sounds are clear bilaterally, No wheezing, rales or rhonchi  Cardiovascular: Regular, S1 and S2, No murmurs, rubs, gallops or clicks  Gastrointestinal: Bowel Sounds present, soft, nontender.   Lymph: No peripheral edema. No lymphadenopathy.  Skin: No visible rashes or ulcers.  Psych:  Mood & affect appropriate for situation    LABS: All Labs Reviewed:                        7.4    7.09  )-----------( 204      ( 24 Jul 2023 08:10 )             24.1                         9.8    11.99 )-----------( 321      ( 23 Jul 2023 18:20 )             31.4     24 Jul 2023 08:10    144    |  117    |  31     ----------------------------<  82     3.6     |  25     |  1.30   23 Jul 2023 18:20    142    |  112    |  38     ----------------------------<  95     4.2     |  26     |  1.70     Ca    6.8        24 Jul 2023 08:10  Ca    8.3        23 Jul 2023 18:20  Phos  2.9       24 Jul 2023 08:10  Mg     1.7       24 Jul 2023 08:10    TPro  5.6    /  Alb  2.5    /  TBili  0.3    /  DBili  x      /  AST  16     /  ALT  17     /  AlkPhos  47     24 Jul 2023 08:10  TPro  7.5    /  Alb  3.3    /  TBili  0.3    /  DBili  x      /  AST  22     /  ALT  24     /  AlkPhos  61     23 Jul 2023 18:20    PT/INR - ( 24 Jul 2023 08:10 )   PT: 13.2 sec;   INR: 1.13 ratio         PTT - ( 24 Jul 2023 08:10 )  PTT:29.3 sec      Blood Culture:     07-24 @ 08:10  TSH: 4.71      CHIEF COMPLAINT: Patient is a 89y old  Female who presents with a chief complaint of hip fracture (24 Jul 2023 07:04)    HPI:  89y female with PMHx of multiple falls, HFpEF (EF 60%), hypertension, hypothyroidism, depression, anxiety presented to the ED for the evaluation of right hip pain s/p mechanical fall. Kwame (son in law) present at bedside. Pt. states that around 4pm today she was walking with an empty tea cup in her hand and fell down forwards onto the right side; hit her head, right arm, hip, and knee and was bleeding. Pt. fell onto wood floor and was down for 5 minutes before son in law found her and picked her up. Pt reports not being able bear weight on her legs and right hip pain on movement, sharp, rated as 6/10 a/w numbness on her lateral right thigh. Pt reports headache, weakness and fatigue; reports recent increased stress d/t living with daughter and son in law. Son in law reports multiple falls this week, pt fell 2 days ago and 4 days ago; also reports decreased PO intake over the past few months - pt. eats a roll in the morning with tea and handful of carbs or protein at night. States that she is in pain despite IV morphine and tylenol.    Pt is DNR/DNI; MOLST form has been signed previously.     Denies fever, chills, chest pain, palpitations, SOB, cough, abdominal pain, nausea, vomiting, diarrhea, constipation, hematochezia, melena, urinary frequency, urgency, dysuria, hematuria, changes in vision, dizziness. No other complaints at this time.    ED course:  Vitals: BP: 154/56, HR: 62, Temp: 98F, RR: 18, SpO2: 96% on RA  Labs significant for: Hgb at baseline 9.8, Cr at baseline 1.70, eGFR 27  EKG: NSR, prolonged QTc 499, HR 68  In ED given: Ofirmev 650mg IV x1, Morphine 2mg IV x1   CT head: No acute abnormality. Chronic changes as above.  CT cervical spine: No acute abnormality. Chronic changes as above. There is partial visualization of an airspace consolidation in the right upper lobe, for which a dedicated CT chest may be helpful for further evaluation.  CT hip: Nondisplaced right intertrochanteric fracture with impaction laterally. (23 Jul 2023 21:05)      EKG: NSR     REVIEW OF SYSTEMS:   All other review of systems are negative unless indicated above    PAST MEDICAL & SURGICAL HISTORY:  Hypothyroid      HTN (hypertension)      HF (heart failure)      Anxiety and depression      S/P hysterectomy      FH: cholecystectomy      Status post-operative repair of closed fracture of right hip          SOCIAL HISTORY:  No tobacco, ethanol, or drug abuse.    FAMILY HISTORY:  FH: heart disease (Father)    FH: heart disease (Mother)    FH: lung cancer (Sibling)      No family history of acute MI or sudden cardiac death.    MEDICATIONS  (STANDING):  citalopram 40 milliGRAM(s) Oral daily  levothyroxine 75 MICROGram(s) Oral daily  magnesium sulfate  IVPB 1 Gram(s) IV Intermittent once  metoprolol succinate ER 50 milliGRAM(s) Oral daily  pantoprazole    Tablet 40 milliGRAM(s) Oral before breakfast  potassium chloride  10 mEq/100 mL IVPB 10 milliEquivalent(s) IV Intermittent every 1 hour  potassium phosphate IVPB 15 milliMole(s) IV Intermittent once  senna 2 Tablet(s) Oral at bedtime  sodium chloride 0.9%. 1000 milliLiter(s) (50 mL/Hr) IV Continuous <Continuous>    MEDICATIONS  (PRN):  acetaminophen     Tablet .. 650 milliGRAM(s) Oral every 6 hours PRN Mild Pain (1 - 3)  HYDROmorphone  Injectable 0.5 milliGRAM(s) IV Push every 4 hours PRN Severe Pain (7 - 10)  melatonin 3 milliGRAM(s) Oral at bedtime PRN Insomnia  traMADol 25 milliGRAM(s) Oral every 4 hours PRN Moderate Pain (4 - 6)      Allergies    sulfa drugs (Nausea)    Intolerances    codeine (Nausea)      Home meds:  Home Medications:  citalopram 40 mg oral tablet: 1 tab(s) orally once a day (23 Jul 2023 22:46)  Lasix 20 mg oral tablet: 1 tab(s) orally every other day (23 Jul 2023 22:46)  levothyroxine 75 mcg (0.075 mg) oral tablet: 1 tab(s) orally once a day (23 Jul 2023 22:46)  metoprolol succinate 50 mg oral tablet, extended release: 1 tab(s) orally once a day (23 Jul 2023 22:46)  omeprazole 20 mg oral delayed release capsule: 1 cap(s) orally once a day (23 Jul 2023 22:46)  potassium chloride 8 mEq (600 mg) oral capsule, extended release: 1 cap(s) orally every other day (23 Jul 2023 22:45)        VITAL SIGNS:   Vital Signs Last 24 Hrs  T(C): 36.7 (24 Jul 2023 04:28), Max: 36.7 (23 Jul 2023 19:00)  T(F): 98 (24 Jul 2023 04:28), Max: 98.1 (24 Jul 2023 00:45)  HR: 69 (24 Jul 2023 04:28) (61 - 69)  BP: 149/69 (24 Jul 2023 04:28) (149/69 - 166/48)  BP(mean): --  RR: 18 (24 Jul 2023 04:28) (18 - 18)  SpO2: 97% (24 Jul 2023 04:28) (95% - 97%)    Parameters below as of 23 Jul 2023 23:27  Patient On (Oxygen Delivery Method): room air        I&O's Summary    23 Jul 2023 07:01  -  24 Jul 2023 07:00  --------------------------------------------------------  IN: 350 mL / OUT: 0 mL / NET: 350 mL    On Exam:  TELE: Not on telemetry   Constitutional: NAD, awake and alert, frail   HEENT: Moist Mucous Membranes, Anicteric  Pulmonary: Non-labored, breath sounds are clear bilaterally, No wheezing, rales or rhonchi  Cardiovascular: Regular, S1 and S2, +murmurs, rubs, gallops or clicks  Gastrointestinal: Bowel Sounds present, soft, nontender.   Lymph: No peripheral edema. No lymphadenopathy.  Skin: No visible rashes or ulcers. right fore head abrasion   Psych:  Mood & affect appropriate for situation    LABS: All Labs Reviewed:                        7.4    7.09  )-----------( 204      ( 24 Jul 2023 08:10 )             24.1                         9.8    11.99 )-----------( 321      ( 23 Jul 2023 18:20 )             31.4     24 Jul 2023 08:10    144    |  117    |  31     ----------------------------<  82     3.6     |  25     |  1.30   23 Jul 2023 18:20    142    |  112    |  38     ----------------------------<  95     4.2     |  26     |  1.70     Ca    6.8        24 Jul 2023 08:10  Ca    8.3        23 Jul 2023 18:20  Phos  2.9       24 Jul 2023 08:10  Mg     1.7       24 Jul 2023 08:10    TPro  5.6    /  Alb  2.5    /  TBili  0.3    /  DBili  x      /  AST  16     /  ALT  17     /  AlkPhos  47     24 Jul 2023 08:10  TPro  7.5    /  Alb  3.3    /  TBili  0.3    /  DBili  x      /  AST  22     /  ALT  24     /  AlkPhos  61     23 Jul 2023 18:20    PT/INR - ( 24 Jul 2023 08:10 )   PT: 13.2 sec;   INR: 1.13 ratio         PTT - ( 24 Jul 2023 08:10 )  PTT:29.3 sec      Blood Culture:     07-24 @ 08:10  TSH: 4.71     < from: TTE Echo Complete w/o Contrast w/ Doppler (05.25.22 @ 11:41) >    ACC: [id number] EXAM:  ECHO TTE WO CON COMP W DOPP                          PROCEDURE DATE:  05/25/2022          INTERPRETATION:  INDICATION: Atrial fibrillation  Sonographer     Blood Pressure 109/48    Height 152 cm     Weight 30.8 kg       BSA 1.18   sq m    Dimensions:  LA 2.3       Normal Values: 2.0 - 4.0 cm  Ao 2.9        Normal Values: 2.0 - 3.8 cm  SEPTUM 1.2       Normal Values: 0.6 - 1.2 cm  PWT 0.8       Normal Values: 0.6 - 1.1 cm  LVIDd 3.7         Normal Values: 3.0 - 5.6 cm  LVIDs 2.9         Normal Values: 1.8 - 4.0 cm      OBSERVATIONS:  Mitral Valve: Mitral annular calcification with calcified leaflets. Mean   transmitral valve gradient equals 4 mmHg at a heart rate of 63 beats from   minute. This is consistent with mild mitral stenosis.  Mild to moderate MR  Aortic Valve/Aorta: Calcified trileaflet aortic valve with decreased   opening. Peak transaortic valve gradient is 23 mmHg with a mean   transaortic valve gradient 13.9 mmHg. The aortic valve area is calculated   to be 1.2 sq cm by continuity equation. This is consistent with moderate   aortic stenosis.  Moderate AI  Tricuspid Valve: normal with trace TR.  Pulmonic Valve: Mild PI  Left Atrium: normal  Right Atrium: Not well-visualized  Left Ventricle: normal LV size and systolic function, estimated LVEF of   60%.  Right Ventricle: Grossly normal size and systolic function.  Pericardium: no significant pericardial effusion.          IMPRESSION:  Normal left ventricular internal dimensions and systolic function,   estimated LVEF of 60%.  Grossly normal RV size and systolic function.  Calcified trileaflet aortic valve with moderate aortic stenosis, moderate   AI.  Mild mitral stenosis with mild to moderate mitral regurgitation  No significant pericardialeffusion.    --- End of Report ---            ASHLEY COUCH MD; Attending Cardiologist  This document has been electronically signed. May 25 2022  1:26PM    < end of copied text >

## 2023-07-24 NOTE — DIETITIAN NUTRITION RISK NOTIFICATION - ADDITIONAL COMMENTS/DIETITIAN RECOMMENDATIONS
palliative consult pending  pt and family agreed for pt not to have surgery   honor pt's food preferences

## 2023-07-24 NOTE — CONSULT NOTE ADULT - ASSESSMENT
89 y female with PMHx of multiple falls, HFpEF (EF 60%), hypertension, hypothyroidism, depression, anxiety presented to the ED for right hip pain s/p mechanical fall.     Preop, HFpEF, HTN  - s/p mechanical fall, sustained Right IT fracture, planned for OR today, ortho following      - c/o chest discomfort this AM, resolved during my exam   - EKG unchanged NSR, no acute ischemia noted  - f/u trop     - Chest X-ray negative for acute process  - TTE (5/2022) normal LVID, LVSF, LVEF 60% Calcified trileaflet aortic valve with moderate aortic stenosis, moderate AI. Mild mitral stenosis with mild to moderate mitral regurgitation  - Patient euvolemic on examination with no overt signs of heart failure or cardiac ischemia.     - BP and HR well controlled  - Continue home Amlodipine and Toprol XL    - pending trop level,  patient is of advanced age do put the patient at a higher surgical risk, though this is non-modifiable at current time, will be optimized from cardiovascular standpoint to proceed with planned procedure with routine hemodynamic monitoring.     - Monitor and replete Lytes. Keep K > 4 and Mg > 2  - Will continue to follow.    Nicole Jauregui [facility]  Nurse Practitioner - Cardiology   call TEAMS     89 y female with PMHx of multiple falls, HFpEF (EF 60%), hypertension, hypothyroidism, depression, anxiety presented to the ED for right hip pain s/p mechanical fall.     Preop, HFpEF, HTN  - s/p mechanical fall, sustained Right IT fracture, planned for OR today, ortho following      - c/o chest discomfort this AM, resolved during my exam   - EKG unchanged NSR, no acute ischemia noted  - f/u trop     - Chest X-ray negative for acute process  - TTE (5/2022) normal LVID, LVSF, LVEF 60% Calcified trileaflet aortic valve with moderate aortic stenosis, moderate AI. Mild mitral stenosis with mild to moderate mitral regurgitation  - Patient euvolemic on examination with no overt signs of heart failure or cardiac ischemia.     - BP and HR well controlled  - Continue home Amlodipine and Toprol XL    - H/H 7.4/24.1, transfuse pre/intra op   - pending trop level,  patient is of advanced age do put the patient at a higher surgical risk, though this is non-modifiable at current time, will be optimized from cardiovascular standpoint to proceed with planned procedure with routine hemodynamic monitoring.     - Monitor and replete Lytes. Keep K > 4 and Mg > 2  - Will continue to follow.    Nicole Jauregui [facility]  Nurse Practitioner - Cardiology   call TEAMS

## 2023-07-25 NOTE — PROGRESS NOTE ADULT - PROBLEM SELECTOR PLAN 2
Pt. with recurrent falls at home; 3 falls this week, refuses to use walker, low PO intake, increased stressors at home  - fall precautions, bed alarm, chair alarm  - IVF duering NPO period  - PT eval   - Social work eval  - Nutrition consult
palliative care

## 2023-07-25 NOTE — PROGRESS NOTE ADULT - PROBLEM SELECTOR PLAN 6
- home citalopram, will hold for now due to concern for long QT   - will have psy eval  dose exceed recommended dosage for elderly
- repeat EKG QT improved but still prolonged   decrease celexa to 20mg

## 2023-07-25 NOTE — PROGRESS NOTE ADULT - PROBLEM SELECTOR PLAN 5
Chronic, 154/56 on admission  - Continue home metoprolol with hold parameters  - monitor hemodynamics
Chronic, 154/56 on admission  - Continue home metoprolol with hold parameters  - monitor hemodynamics

## 2023-07-25 NOTE — DISCHARGE NOTE PROVIDER - DETAILS OF MALNUTRITION DIAGNOSIS/DIAGNOSES
This patient has been assessed with a concern for Malnutrition and was treated during this hospitalization for the following Nutrition diagnosis/diagnoses:     -  07/24/2023: Underweight (BMI < 19)

## 2023-07-25 NOTE — DISCHARGE NOTE PROVIDER - NSDCMRMEDTOKEN_GEN_ALL_CORE_FT
citalopram 40 mg oral tablet: 1 tab(s) orally once a day  Lasix 20 mg oral tablet: 1 tab(s) orally every other day  levothyroxine 75 mcg (0.075 mg) oral tablet: 1 tab(s) orally once a day  metoprolol succinate 50 mg oral tablet, extended release: 1 tab(s) orally once a day  omeprazole 20 mg oral delayed release capsule: 1 cap(s) orally once a day  potassium chloride 8 mEq (600 mg) oral capsule, extended release: 1 cap(s) orally every other day   acetaminophen 325 mg oral tablet: 2 tab(s) orally every 6 hours As needed Mild Pain (1 - 3)  citalopram 20 mg oral tablet: 1 tab(s) orally once a day  Lasix 20 mg oral tablet: 1 tab(s) orally every other day  levothyroxine 75 mcg (0.075 mg) oral tablet: 1 tab(s) orally once a day  lidocaine 4% topical film: Apply topically to affected area once a day  Melatonin 3 mg oral tablet: 1 tab(s) orally once a day (at bedtime) As needed Insomnia  metoprolol succinate 50 mg oral tablet, extended release: 1 tab(s) orally once a day  omeprazole 20 mg oral delayed release capsule: 1 cap(s) orally once a day  oxyCODONE: 2.5 milligram(s) orally every 4 hours as needed for  moderate pain  oxyCODONE 5 mg oral tablet: 1 tab(s) orally every 4 hours As needed Severe Pain (7 - 10)  potassium chloride 8 mEq (600 mg) oral capsule, extended release: 1 cap(s) orally every other day  senna leaf extract oral tablet: 2 tab(s) orally once a day (at bedtime)

## 2023-07-25 NOTE — PROGRESS NOTE ADULT - PROBLEM SELECTOR PLAN 4
- Continue home levothyroxine  - TSH, mild elevated   - will allow mild hypothyroid due to age and co morbility
- Continue home levothyroxine  - TSH, mild elevated   - will allow mild hypothyroid due to age and co morbility

## 2023-07-25 NOTE — PHYSICAL THERAPY INITIAL EVALUATION ADULT - PERTINENT HX OF CURRENT PROBLEM, REHAB EVAL
89y female with PMHx of multiple falls, HFpEF (EF 60%), hypertension, hypothyroidism, depression, anxiety presented to the ED for the evaluation of right hip pain s/p mechanical fall. Kwame (son in law) present at bedside. Pt. states that around 4pm today she was walking with an empty tea cup in her hand and fell down forwards onto the right side; hit her head, right arm, hip, and knee and was bleeding. Pt. fell onto wood floor and was down for 5 minutes before son in law found her and picked her up. Pt reports not being able bear weight on her legs and right hip pain on movement, sharp, rated as 6/10 a/w numbness on her lateral right thigh. Pt reports headache, weakness and fatigue; reports recent increased stress d/t living with daughter and son in law. Son in law reports multiple falls this week, pt fell 2 days ago and 4 days ago; also reports decreased PO intake over the past few months - pt. eats a roll in the morning with tea and handful of carbs or protein at night. States that she is in pain despite IV morphine and tylenol.

## 2023-07-25 NOTE — PROGRESS NOTE ADULT - PROBLEM SELECTOR PLAN 3
c/w metoprolol
Chronic HFpEF - does not appear to be overtly volume overloaded - compensated  - Will hold loop diuretic for now while NPO and receiving IV fluids  - Daily weights, strict I's and O's  - cardio eval

## 2023-07-25 NOTE — PROGRESS NOTE ADULT - TIME BILLING
Time spent on total encounter assessing patient, examination, chart review, counseling and coordinating care by the attending physician/nurse/care manager as well as GOC discussion.
direct patient care including but not limited to reviewing chart, medications ,laboratory data, imaging reports, discussion of plan of care with consultants on the case, coordination of care with multidisciplinary team involved in the case and discussion of plan with patient.  Patient and family agreeable to plan of care and verbalized understanding the anticipated hospital course and treatment plan.
direct patient care including but not limited to reviewing chart, medications ,laboratory data, imaging reports, discussion of plan of care with consultants on the case, coordination of care with multidisciplinary team involved in the case and discussion of plan with patient.  Patient and family agreeable to plan of care and verbalized understanding the anticipated hospital course and treatment plan.

## 2023-07-25 NOTE — DISCHARGE NOTE PROVIDER - NSDCFUADDINST_GEN_ALL_CORE_FT
NWB at this time but can undergo transfers and eventually bed to chair as tolerated starting in 2 weeks.

## 2023-07-25 NOTE — PROGRESS NOTE ADULT - PROBLEM SELECTOR PLAN 1
Patient presents s/p mechanical fall; admitted for right hip fracture   CT head/ cervical spine: no acute abnormality  CT Hip: Nondisplaced right intertrochanteric fracture with impaction laterally  - Pain regimen; Tylenol 650mg q6h PRN, oxycodone prn , lidoderm, ice bag prn    as per [facility] conversation from yesterday by all teams on the case, decided on non operative medical care and hospice care.   now awaiting for rehab placement with comfort care to [facility], discussed with ADRIENNE   options of treatments again discussed, patient states she will maintain the same decision.   no blood draw, no blood transfusion, no IV fluid, comfort care only as ordered
Patient presents s/p mechanical fall; admitted for right hip fracture   CT head/ cervical spine: no acute abnormality  CT Hip: Nondisplaced right intertrochanteric fracture with impaction laterally  - NPO after midnight  - Pain regimen; Tylenol 650mg q6h PRN, dilaudid 0.5mg IVP q8h PRN for severe pain  - Ortho consulted in ED, recommends OR later today   - Labs reviewed - worsening anemia due to acute bleeding , blood transfusion  ordered.  GIL improved.   -  will obtain repeat EKG and troponin /pro bnp due to c/o CP and prior EKG show prolong QTC   - patient report history of Heart failure, last cardio check up was one year ago, was told to have "leaky aortic valve?"  - will optimize patient prior to surgery today   - Cardio (Dr. Coles) consulted for cardiac clearance

## 2023-07-25 NOTE — DISCHARGE NOTE PROVIDER - ATTENDING DISCHARGE PHYSICAL EXAMINATION:
Call please [phone]  [facility] in [place]  Regards to referal they sent making sure dr does this procedure    Vitals:  T: 98.2  P:  78  BP:  154/56  RR:  18  SpO2:  85% RA  GENERAL: NAD, siting in bed   HEAD:  Normocephalic  EYES:  conjunctiva and sclera clear  ENT: Moist mucous membranes  NECK: Supple  CHEST/LUNG: diminished BS at bases; No rales or rhonchi; no wheezing. Unlabored respirations  HEART: Regular rate and rhythm; S1S2+  ABDOMEN: Bowel sounds present; Soft, Nontender, Nondistended.   EXTREMITIES:  + distal Peripheral Pulses;  right LE slightly externally rotated   NERVOUS SYSTEM:  Alert & Oriented X3;  No gross focal deficits   MSK: moves all extremities except RLE   SKIN: No rashes

## 2023-07-25 NOTE — PROGRESS NOTE ADULT - NUTRITIONAL ASSESSMENT
This patient has been assessed with a concern for Malnutrition and has been determined to have a diagnosis/diagnoses of Underweight (BMI < 19).    This patient is being managed with:   Diet Regular-  DASH/TLC {Sodium & Cholesterol Restricted}  Soft and Bite Sized (SOFTBTSZ)  Entered: Jul 24 2023  5:57PM

## 2023-07-25 NOTE — SWALLOW BEDSIDE ASSESSMENT ADULT - COMMENTS
Chart reviewed, case discussed with Dr. Jung, swallow eval was ordered yesterday however, was on hold as pt was NPO for procedure.  Per MD, pt/family refused sx and are also declining swallow evaluation.  Per MD placed on soft & bite sized with thin liquid diet at request of pt/family and swallow eval no longer warranted.  No further follow up at this time, please reconsult prn.
Chart reviewed, order received for swallow eval, however, pt currently NPO for procedure.  Per MD order  "please evaluate patient after her orthopedic surgery , NPO for now" therefore, will follow to complete swallow eval when appropriate.  Confirmed with Dr. Jung.

## 2023-07-25 NOTE — PROGRESS NOTE ADULT - PROBLEM SELECTOR PLAN 7
DVT PPx: SCD for comfort care and anemia due to active bleed
DVT PPx: hold ac; possible OR in the AM; SCDs     #DISPO  - PT Eval  - Social work eval   - Nutrition consulted

## 2023-07-25 NOTE — DISCHARGE NOTE PROVIDER - DISCHARGE DIET
Constipated per the er. Advised start miralax now. Follow up in 2-3 weeks.    DASH Diet/Soft and Bite-Sized Diet

## 2023-07-25 NOTE — PHYSICAL THERAPY INITIAL EVALUATION ADULT - ADDITIONAL COMMENTS
Pt stated she owns rolling walker but ambulated in home without device.  (+) 5-6 steps to perform in house with railing.

## 2023-07-25 NOTE — DISCHARGE NOTE PROVIDER - NSDCFUSCHEDAPPT_GEN_ALL_CORE_FT
Khushi Herndon  [facility]  GERIATRICS [address] [place] [address]   Scheduled Appointment: 08/10/2023

## 2023-07-25 NOTE — DISCHARGE NOTE PROVIDER - HOSPITAL COURSE
ADMISSION DATE:  07-23-23    ---  FROM ADMISSION H+P:   HPI:  89y female with PMHx of multiple falls, HFpEF (EF 60%), hypertension, hypothyroidism, depression, anxiety presented to the ED for the evaluation of right hip pain s/p mechanical fall. Kwame (son in law) present at bedside. Pt. states that around 4pm today she was walking with an empty tea cup in her hand and fell down forwards onto the right side; hit her head, right arm, hip, and knee and was bleeding. Pt. fell onto wood floor and was down for 5 minutes before son in law found her and picked her up. Pt reports not being able bear weight on her legs and right hip pain on movement, sharp, rated as 6/10 a/w numbness on her lateral right thigh. Pt reports headache, weakness and fatigue; reports recent increased stress d/t living with daughter and son in law. Son in law reports multiple falls this week, pt fell 2 days ago and 4 days ago; also reports decreased PO intake over the past few months - pt. eats a roll in the morning with tea and handful of carbs or protein at night. States that she is in pain despite IV morphine and tylenol.    Pt is DNR/DNI; MOLST form has been signed previously.     Denies fever, chills, chest pain, palpitations, SOB, cough, abdominal pain, nausea, vomiting, diarrhea, constipation, hematochezia, melena, urinary frequency, urgency, dysuria, hematuria, changes in vision, dizziness. No other complaints at this time.    ED course:  Vitals: BP: 154/56, HR: 62, Temp: 98F, RR: 18, SpO2: 96% on RA  Labs significant for: Hgb at baseline 9.8, Cr at baseline 1.70, eGFR 27  EKG: NSR, prolonged QTc 499, HR 68  In ED given: Ofirmev 650mg IV x1, Morphine 2mg IV x1   CT head: No acute abnormality. Chronic changes as above.  CT cervical spine: No acute abnormality. Chronic changes as above. There is partial visualization of an airspace consolidation in the right upper lobe, for which a dedicated CT chest may be helpful for further evaluation.  CT hip: Nondisplaced right intertrochanteric fracture with impaction laterally. (23 Jul 2023 21:05)      ---  HOSPITAL COURSE/PERTINENT LABS/PROCEDURES PERFORMED/PENDING TESTS:   Pt was admitted for right hip IT fracture   CT head/ cervical spine: no acute abnormality  CT Hip: Nondisplaced right intertrochanteric fracture with impaction laterally  - Pain regimen; Tylenol 650mg q6h PRN, oxycodone prn , lidoderm, ice bag prn    initially plan for orthopedic surgery , but patient and family requested hospice evaluation , as per GOC conversation with patient and family by all teams , decided on non operative medical care and hospice care. now awaiting for rehab placement with comfort care to [facility].         Patient is stable for discharge as per primary medical team and consultants.    PT consulted, recommends discharge ______    Patient showed improvement throughout hospitalization. Patient was seen and examined on day of discharge. Patient was medically optimized for discharge with close outpatient follow up.    ---  PATIENT CONDITION:  - stable    --  VITALS:   T(C): 37 (07-25-23 @ 10:51), Max: 37 (07-25-23 @ 04:55)  HR: 64 (07-25-23 @ 10:51) (64 - 69)  BP: 127/38 (07-25-23 @ 10:51) (127/38 - 151/43)  RR: 18 (07-25-23 @ 10:51) (18 - 18)  SpO2: 93% (07-25-23 @ 10:51) (88% - 93%)    PHYSICAL EXAM ON DAY OF DISCHARGE:  Patient presents s/p mechanical fall; admitted for right hip fracture   CT head/ cervical spine: no acute abnormality  CT Hip: Nondisplaced right intertrochanteric fracture with impaction laterally  - Pain regimen; Tylenol 650mg q6h PRN, oxycodone prn , lidoderm, ice bag prn    as per [facility] conversation from yesterday by all teams on the case, decided on non operative medical care and hospice care.   now awaiting for rehab placement with comfort care to [facility], discussed with SW   options of treatments again discussed, patient states she will maintain the same decision.   no blood draw, no blood transfusion, no IV fluid, comfort care only as ordered.  ---  CONSULTANTS:   -    ---  ADVANCED CARE PLANNING:  - Code status:      [facility]      [  ] NO     [  ] YES    ---  TIME SPENT:  I, the attending physician, was physically present for the key portions of the evaluation and management (E/M) service provided. The total amount of time spent reviewing the hospital notes, laboratory values, imaging findings, assessing/counseling the patient, discussing with consultant physicians, social work, nursing staff was -- minutes       ADMISSION DATE:  07-23-23    ---  FROM ADMISSION H+P:   HPI:  89y female with PMHx of multiple falls, HFpEF (EF 60%), hypertension, hypothyroidism, depression, anxiety presented to the ED for the evaluation of right hip pain s/p mechanical fall. Kwame (son in law) present at bedside. Pt. states that around 4pm today she was walking with an empty tea cup in her hand and fell down forwards onto the right side; hit her head, right arm, hip, and knee and was bleeding. Pt. fell onto wood floor and was down for 5 minutes before son in law found her and picked her up. Pt reports not being able bear weight on her legs and right hip pain on movement, sharp, rated as 6/10 a/w numbness on her lateral right thigh. Pt reports headache, weakness and fatigue; reports recent increased stress d/t living with daughter and son in law. Son in law reports multiple falls this week, pt fell 2 days ago and 4 days ago; also reports decreased PO intake over the past few months - pt. eats a roll in the morning with tea and handful of carbs or protein at night. States that she is in pain despite IV morphine and tylenol.    Pt is DNR/DNI; MOLST form has been signed previously.     Denies fever, chills, chest pain, palpitations, SOB, cough, abdominal pain, nausea, vomiting, diarrhea, constipation, hematochezia, melena, urinary frequency, urgency, dysuria, hematuria, changes in vision, dizziness. No other complaints at this time.    ED course:  Vitals: BP: 154/56, HR: 62, Temp: 98F, RR: 18, SpO2: 96% on RA  Labs significant for: Hgb at baseline 9.8, Cr at baseline 1.70, eGFR 27  EKG: NSR, prolonged QTc 499, HR 68  In ED given: Ofirmev 650mg IV x1, Morphine 2mg IV x1   CT head: No acute abnormality. Chronic changes as above.  CT cervical spine: No acute abnormality. Chronic changes as above. There is partial visualization of an airspace consolidation in the right upper lobe, for which a dedicated CT chest may be helpful for further evaluation.  CT hip: Nondisplaced right intertrochanteric fracture with impaction laterally. (23 Jul 2023 21:05)      ---  HOSPITAL COURSE/PERTINENT LABS/PROCEDURES PERFORMED/PENDING TESTS:   Pt was admitted for right hip IT fracture   CT head/ cervical spine: no acute abnormality  CT Hip: Nondisplaced right intertrochanteric fracture with impaction laterally  - Pain regimen; Tylenol 650mg q6h PRN, oxycodone prn , lidoderm, ice bag prn    initially plan for orthopedic surgery , but patient and family requested hospice evaluation , as per [facility] conversation with patient and family by all teams , decided on non operative medical care and hospice care. Now awaiting for rehab placement with comfort care to [facility].     Pt. noted to also have mild intermittent hypoxia with SpO2 ranging mid 80 to low 90th%.  Will give supplemental O2 via nasal canula and resume lasix upon discharge.    Options of treatments again discussed, patient states she will maintain the same decision.   No blood draw, no blood transfusion, no IV fluid, comfort care only as ordered.   ADMISSION DATE:  07-23-23    ---  FROM ADMISSION H+P:   HPI:  89y female with PMHx of multiple falls, HFpEF (EF 60%), hypertension, hypothyroidism, depression, anxiety presented to the ED for the evaluation of right hip pain s/p mechanical fall. Kwame (son in law) present at bedside. Pt. states that around 4pm today she was walking with an empty tea cup in her hand and fell down forwards onto the right side; hit her head, right arm, hip, and knee and was bleeding. Pt. fell onto wood floor and was down for 5 minutes before son in law found her and picked her up. Pt reports not being able bear weight on her legs and right hip pain on movement, sharp, rated as 6/10 a/w numbness on her lateral right thigh. Pt reports headache, weakness and fatigue; reports recent increased stress d/t living with daughter and son in law. Son in law reports multiple falls this week, pt fell 2 days ago and 4 days ago; also reports decreased PO intake over the past few months - pt. eats a roll in the morning with tea and handful of carbs or protein at night. States that she is in pain despite IV morphine and tylenol.    Pt is DNR/DNI; MOLST form has been signed previously.     Denies fever, chills, chest pain, palpitations, SOB, cough, abdominal pain, nausea, vomiting, diarrhea, constipation, hematochezia, melena, urinary frequency, urgency, dysuria, hematuria, changes in vision, dizziness. No other complaints at this time.    ED course:  Vitals: BP: 154/56, HR: 62, Temp: 98F, RR: 18, SpO2: 96% on RA  Labs significant for: Hgb at baseline 9.8, Cr at baseline 1.70, eGFR 27  EKG: NSR, prolonged QTc 499, HR 68  In ED given: Ofirmev 650mg IV x1, Morphine 2mg IV x1   CT head: No acute abnormality. Chronic changes as above.  CT cervical spine: No acute abnormality. Chronic changes as above. There is partial visualization of an airspace consolidation in the right upper lobe, for which a dedicated CT chest may be helpful for further evaluation.  CT hip: Nondisplaced right intertrochanteric fracture with impaction laterally. (23 Jul 2023 21:05)      ---  HOSPITAL COURSE/PERTINENT LABS/PROCEDURES PERFORMED/PENDING TESTS:   Pt was admitted for right hip IT fracture   CT head/ cervical spine: no acute abnormality  CT Hip: Nondisplaced right intertrochanteric fracture with impaction laterally  - Pain regimen; Tylenol 650mg q6h PRN, oxycodone prn , lidoderm, ice bag prn    initially plan for orthopedic surgery , but patient and family requested hospice evaluation , as per [facility] conversation with patient and family by all teams , decided on non operative medical care and hospice care. Now awaiting for rehab placement with comfort care to [facility].     Pt. noted to also have mild intermittent hypoxia with SpO2 ranging mid 80 to low 90th%.  Will give supplemental O2 via nasal canula and resume lasix upon discharge.    Options of treatments again discussed, patient states she will maintain the same decision.   No blood draw, no blood transfusion, no IV fluid, comfort care only as ordered.  Discussed with daughter who is in agreement to continue comfort care measures.

## 2023-07-25 NOTE — DISCHARGE NOTE PROVIDER - NSDCCPCAREPLAN_GEN_ALL_CORE_FT
PRINCIPAL DISCHARGE DIAGNOSIS  Diagnosis: Hip fracture  Assessment and Plan of Treatment: right hip IT fracture, consented on non operative medical management for comfort care only. ortho/palliative care team consulted.  asp er ortho: NWB at this time but can undergo transfers and eventually bed to chair as tolerated starting in 2 weeks.      SECONDARY DISCHARGE DIAGNOSES  Diagnosis: Anemia due to acute blood loss  Assessment and Plan of Treatment: decllined blood trasfusion     PRINCIPAL DISCHARGE DIAGNOSIS  Diagnosis: Hip fracture  Assessment and Plan of Treatment: right hip IT fracture, consented on non operative medical management for comfort care only. ortho/palliative care team consulted.  as per ortho: NWB at this time but can undergo transfers and eventually bed to chair as tolerated starting in 2 weeks.      SECONDARY DISCHARGE DIAGNOSES  Diagnosis: Anemia due to acute blood loss  Assessment and Plan of Treatment: decllined blood trasfusion    Diagnosis: Hypoxia  Assessment and Plan of Treatment: Intermittent.  May be related to anemia or CHF.  Resume lasix.  Started on supplemental oxygen via nasal canula.  Continue comfort measures.

## 2023-07-26 NOTE — PROGRESS NOTE ADULT - NS ATTEND AMEND GEN_ALL_CORE FT
had been planning to pursue imn  family and patient now in favor of hospice and non-operative management for her hip fx  no sign of volume overload
I have personally seen and examined the patient in detail.  I have spoken to the provider regarding the assessment and plan of care.  I have made changes to the note accordingly.

## 2023-07-26 NOTE — SOCIAL WORK PROGRESS NOTE - NSSWPROGRESSNOTE_GEN_ALL_CORE
Per MD, pt stable for d/c to [facility] on comfort measures. [facility] confirmed that they have available bed today. ADRIENNE spoke with dtr Dot to alert of d.c for today. She expressed understanding and will come to unit today for d/c. ADRIENNE set up [facility] transport today at 12PM via [facility]. SW to follow and remain available for any needs.

## 2023-07-26 NOTE — DISCHARGE NOTE NURSING/CASE MANAGEMENT/SOCIAL WORK - NSDCPEFALRISK_GEN_ALL_CORE
For information on Fall & Injury Prevention, visit: [email or web address] OR  [email or web address] OR  https://www.cdc.gov/steadi/patient.html

## 2023-07-26 NOTE — DISCHARGE NOTE NURSING/CASE MANAGEMENT/SOCIAL WORK - NSDCVIVACCINE_GEN_ALL_CORE_FT
Tdap; 31-Dec-2014 16:53; Lynn Sheldon (JASON); [employer]; [id number]; IntraMuscular; Deltoid Left.; 0.5 milliLiter(s);

## 2023-07-26 NOTE — PROGRESS NOTE ADULT - SUBJECTIVE AND OBJECTIVE BOX
[facility] -- Sanket Jacobo Pannella, Patel, Savella, Goodger  Office # [phone]    Follow Up:  cardiac optimization     Subjective/Observations: seen and examined, awake, alert, resting comfortably in bed, denies chest pain, dyspnea, palpitations or dizziness, orthopnea and PND. Tolerating room air.     REVIEW OF SYSTEMS: All other review of systems is negative unless indicated above  PAST MEDICAL & SURGICAL HISTORY:  Hypothyroid      HTN (hypertension)      HF (heart failure)      Anxiety and depression      S/P hysterectomy      FH: cholecystectomy      Status post-operative repair of closed fracture of right hip        MEDICATIONS  (STANDING):  citalopram 20 milliGRAM(s) Oral daily  levothyroxine 75 MICROGram(s) Oral daily  lidocaine   4% Patch 1 Patch Transdermal daily  metoprolol succinate ER 50 milliGRAM(s) Oral daily  pantoprazole    Tablet 40 milliGRAM(s) Oral before breakfast  senna 2 Tablet(s) Oral at bedtime    MEDICATIONS  (PRN):  acetaminophen     Tablet .. 650 milliGRAM(s) Oral every 6 hours PRN Mild Pain (1 - 3)  melatonin 3 milliGRAM(s) Oral at bedtime PRN Insomnia  oxyCODONE    IR 5 milliGRAM(s) Oral every 4 hours PRN Severe Pain (7 - 10)  oxyCODONE    IR 2.5 milliGRAM(s) Oral every 4 hours PRN Moderate Pain (4 - 6)    Allergies    sulfa drugs (Nausea)    Intolerances    codeine (Nausea)    Vital Signs Last 24 Hrs  T(C): 36.8 (26 Jul 2023 05:10), Max: 36.8 (26 Jul 2023 05:10)  T(F): 98.2 (26 Jul 2023 05:10), Max: 98.2 (26 Jul 2023 05:10)  HR: 78 (26 Jul 2023 05:10) (78 - 78)  BP: 154/56 (26 Jul 2023 05:10) (154/56 - 154/56)  BP(mean): --  RR: 18 (26 Jul 2023 05:10) (18 - 18)  SpO2: 85% (26 Jul 2023 05:10) (85% - 85%)    Parameters below as of 26 Jul 2023 05:10  Patient On (Oxygen Delivery Method): room air      I&O's Summary    25 Jul 2023 07:01  -  26 Jul 2023 07:00  --------------------------------------------------------  IN: 620 mL / OUT: 250 mL / NET: 370 mL        TELE: Not on telemetry   PHYSICAL EXAM:  Constitutional: NAD, awake and alert,frail  HEENT: Moist Mucous Membranes, Anicteric  Pulmonary: Non-labored, breath sounds are clear bilaterally, No wheezing, rales or rhonchi  Cardiovascular: Regular, S1 and S2, + murmurs, rubs, gallops or clicks  Gastrointestinal: Bowel Sounds present, soft, nontender.   Lymph: No peripheral edema. No lymphadenopathy.  Skin: No visible rashes or ulcers.  Psych:  Mood & affect appropriate  LABS: All Labs Reviewed:                        7.4    7.09  )-----------( 204      ( 24 Jul 2023 08:10 )             24.1                         9.8    11.99 )-----------( 321      ( 23 Jul 2023 18:20 )             31.4     24 Jul 2023 08:10    144    |  117    |  31     ----------------------------<  82     3.6     |  25     |  1.30   23 Jul 2023 18:20    142    |  112    |  38     ----------------------------<  95     4.2     |  26     |  1.70     Ca    6.8        24 Jul 2023 08:10  Ca    8.3        23 Jul 2023 18:20  Phos  2.9       24 Jul 2023 08:10  Mg     1.7       24 Jul 2023 08:10    TPro  5.6    /  Alb  2.5    /  TBili  0.3    /  DBili  x      /  AST  16     /  ALT  17     /  AlkPhos  47     24 Jul 2023 08:10  TPro  7.5    /  Alb  3.3    /  TBili  0.3    /  DBili  x      /  AST  22     /  ALT  24     /  AlkPhos  61     23 Jul 2023 18:20          12 Lead ECG:   Ventricular Rate 60 BPM    Atrial Rate 60 BPM    P-R Interval 156 ms    QRS Duration 74 ms    Q-T Interval 488 ms    QTC Calculation(Bazett) 488 ms    P Axis 97 degrees    R Axis 29 degrees    T Axis 65 degrees    Diagnosis Line Normal sinus rhythm  Normal ECG  When compared with ECG of 23-JUL-2023 21:25, (Unconfirmed)  No significant change was found  Confirmed by Louis Coles MD ([id number]) on 7/24/2023 1:38:09 PM (07-24-23 @ 10:01)      ACC: [id number] EXAM:  ECHO TTE WO CON COMP W DOPP                          PROCEDURE DATE:  05/25/2022          INTERPRETATION:  INDICATION: Atrial fibrillation  Sonographer     Blood Pressure 109/48    Height 152 cm     Weight 30.8 kg       BSA 1.18   sq m    Dimensions:  LA 2.3       Normal Values: 2.0 - 4.0 cm  Ao 2.9        Normal Values: 2.0 - 3.8 cm  SEPTUM 1.2       Normal Values: 0.6 - 1.2 cm  PWT 0.8       Normal Values: 0.6 - 1.1 cm  LVIDd 3.7         Normal Values: 3.0 - 5.6 cm  LVIDs 2.9         Normal Values: 1.8 - 4.0 cm      OBSERVATIONS:  Mitral Valve: Mitral annular calcification with calcified leaflets. Mean   transmitral valve gradient equals 4 mmHg at a heart rate of 63 beats from   minute. This is consistent with mild mitral stenosis.  Mild to moderate MR  Aortic Valve/Aorta: Calcified trileaflet aortic valve with decreased   opening. Peak transaortic valve gradient is 23 mmHg with a mean   transaortic valve gradient 13.9 mmHg. The aortic valve area is calculated   to be 1.2 sq cm by continuity equation. This is consistent with moderate   aortic stenosis.  Moderate AI  Tricuspid Valve: normal with trace TR.  Pulmonic Valve: Mild PI  Left Atrium: normal  Right Atrium: Not well-visualized  Left Ventricle: normal LV size and systolic function, estimated LVEF of   60%.  Right Ventricle: Grossly normal size and systolic function.  Pericardium: no significant pericardial effusion.          IMPRESSION:  Normal left ventricular internal dimensions and systolic function,   estimated LVEF of 60%.  Grossly normal RV size and systolic function.  Calcified trileaflet aortic valve with moderate aortic stenosis, moderate   AI.  Mild mitral stenosis with mild to moderate mitral regurgitation  No significant pericardialeffusion.    --- End of Report ---            ASHLEY COUCH MD; Attending Cardiologist  This document has been electronically signed. May 25 2022  1:26PM     
[facility] -- Sanket Jacobo Pannella, Patel, Savella, Goodger  Office # [phone]    Follow Up:  cardiac optimization     Subjective/Observations: seen and examined, awake, alert, eating breakfast in bed, denies chest pain, dyspnea, palpitations or dizziness, orthopnea and PND. Tolerating room air.c/o  hip pain     REVIEW OF SYSTEMS: All other review of systems is negative unless indicated above  PAST MEDICAL & SURGICAL HISTORY:  Hypothyroid      HTN (hypertension)      HF (heart failure)      Anxiety and depression      S/P hysterectomy      FH: cholecystectomy      Status post-operative repair of closed fracture of right hip        MEDICATIONS  (STANDING):  citalopram 20 milliGRAM(s) Oral daily  levothyroxine 75 MICROGram(s) Oral daily  lidocaine   4% Patch 1 Patch Transdermal daily  metoprolol succinate ER 50 milliGRAM(s) Oral daily  pantoprazole    Tablet 40 milliGRAM(s) Oral before breakfast  senna 2 Tablet(s) Oral at bedtime    MEDICATIONS  (PRN):  acetaminophen     Tablet .. 650 milliGRAM(s) Oral every 6 hours PRN Mild Pain (1 - 3)  melatonin 3 milliGRAM(s) Oral at bedtime PRN Insomnia  oxyCODONE    IR 5 milliGRAM(s) Oral every 4 hours PRN Severe Pain (7 - 10)  oxyCODONE    IR 2.5 milliGRAM(s) Oral every 4 hours PRN Moderate Pain (4 - 6)    Allergies    sulfa drugs (Nausea)    Intolerances    codeine (Nausea)    Vital Signs Last 24 Hrs  T(C): 37 (25 Jul 2023 10:51), Max: 37 (25 Jul 2023 04:55)  T(F): 98.6 (25 Jul 2023 10:51), Max: 98.6 (25 Jul 2023 04:55)  HR: 64 (25 Jul 2023 10:51) (62 - 69)  BP: 127/38 (25 Jul 2023 10:51) (105/43 - 163/53)  BP(mean): --  RR: 18 (25 Jul 2023 10:51) (18 - 20)  SpO2: 93% (25 Jul 2023 10:51) (88% - 94%)    Parameters below as of 25 Jul 2023 10:51  Patient On (Oxygen Delivery Method): room air      I&O's Summary    24 Jul 2023 07:01  -  25 Jul 2023 07:00  --------------------------------------------------------  IN: 250 mL / OUT: 350 mL / NET: -100 mL        TELE: Not on telemetry   PHYSICAL EXAM:  Constitutional: NAD, awake and alert, thin, frail   HEENT: Moist Mucous Membranes, Anicteric  Pulmonary: Non-labored, breath sounds are clear bilaterally, No wheezing, rales or rhonchi  Cardiovascular: Regular, S1 and S2, +murmurs, rubs, gallops or clicks  Gastrointestinal: Bowel Sounds present, soft, nontender.   Lymph: No peripheral edema. No lymphadenopathy.  Skin: No visible rashes or ulcers.  Psych:  Mood & affect appropriate  LABS: All Labs Reviewed:                        7.4    7.09  )-----------( 204      ( 24 Jul 2023 08:10 )             24.1                         9.8    11.99 )-----------( 321      ( 23 Jul 2023 18:20 )             31.4     24 Jul 2023 08:10    144    |  117    |  31     ----------------------------<  82     3.6     |  25     |  1.30   23 Jul 2023 18:20    142    |  112    |  38     ----------------------------<  95     4.2     |  26     |  1.70     Ca    6.8        24 Jul 2023 08:10  Ca    8.3        23 Jul 2023 18:20  Phos  2.9       24 Jul 2023 08:10  Mg     1.7       24 Jul 2023 08:10    TPro  5.6    /  Alb  2.5    /  TBili  0.3    /  DBili  x      /  AST  16     /  ALT  17     /  AlkPhos  47     24 Jul 2023 08:10  TPro  7.5    /  Alb  3.3    /  TBili  0.3    /  DBili  x      /  AST  22     /  ALT  24     /  AlkPhos  61     23 Jul 2023 18:20    PT/INR - ( 24 Jul 2023 08:10 )   PT: 13.2 sec;   INR: 1.13 ratio         PTT - ( 24 Jul 2023 08:10 )  PTT:29.3 sec  CARDIAC MARKERS ( 24 Jul 2023 08:10 )  x     / x     / x     / x     / 1.7 ng/mL      12 Lead ECG:   Ventricular Rate 60 BPM    Atrial Rate 60 BPM    P-R Interval 156 ms    QRS Duration 74 ms    Q-T Interval 488 ms    QTC Calculation(Bazett) 488 ms    P Axis 97 degrees    R Axis 29 degrees    T Axis 65 degrees    Diagnosis Line Normal sinus rhythm  Normal ECG  When compared with ECG of 23-JUL-2023 21:25, (Unconfirmed)  No significant change was found  Confirmed by Louis Coles MD ([id number]) on 7/24/2023 1:38:09 PM (07-24-23 @ 10:01)      ACC: [id number] EXAM:  ECHO TTE WO CON COMP W DOPP                          PROCEDURE DATE:  05/25/2022          INTERPRETATION:  INDICATION: Atrial fibrillation  Sonographer     Blood Pressure 109/48    Height 152 cm     Weight 30.8 kg       BSA 1.18   sq m    Dimensions:  LA 2.3       Normal Values: 2.0 - 4.0 cm  Ao 2.9        Normal Values: 2.0 - 3.8 cm  SEPTUM 1.2       Normal Values: 0.6 - 1.2 cm  PWT 0.8       Normal Values: 0.6 - 1.1 cm  LVIDd 3.7         Normal Values: 3.0 - 5.6 cm  LVIDs 2.9         Normal Values: 1.8 - 4.0 cm      OBSERVATIONS:  Mitral Valve: Mitral annular calcification with calcified leaflets. Mean   transmitral valve gradient equals 4 mmHg at a heart rate of 63 beats from   minute. This is consistent with mild mitral stenosis.  Mild to moderate MR  Aortic Valve/Aorta: Calcified trileaflet aortic valve with decreased   opening. Peak transaortic valve gradient is 23 mmHg with a mean   transaortic valve gradient 13.9 mmHg. The aortic valve area is calculated   to be 1.2 sq cm by continuity equation. This is consistent with moderate   aortic stenosis.  Moderate AI  Tricuspid Valve: normal with trace TR.  Pulmonic Valve: Mild PI  Left Atrium: normal  Right Atrium: Not well-visualized  Left Ventricle: normal LV size and systolic function, estimated LVEF of   60%.  Right Ventricle: Grossly normal size and systolic function.  Pericardium: no significant pericardial effusion.          IMPRESSION:  Normal left ventricular internal dimensions and systolic function,   estimated LVEF of 60%.  Grossly normal RV size and systolic function.  Calcified trileaflet aortic valve with moderate aortic stenosis, moderate   AI.  Mild mitral stenosis with mild to moderate mitral regurgitation  No significant pericardialeffusion.    --- End of Report ---            ASHLEY COUCH MD; Attending Cardiologist  This document has been electronically signed. May 25 2022  1:26PM     
Patient is a 89y old  Female who presents with a chief complaint of hip fracture (24 Jul 2023 07:04)      Subjective:  INTERVAL HPI/OVERNIGHT EVENTS: Patient seen and examined at bedside.  Patient c/o right hip pain and had few min of stabbing left chest pain one hour ago which is already resolved. no n/v/sob/diaphoresis /dizziness/headache/abd pain.   MEDICATIONS  (STANDING):  citalopram 40 milliGRAM(s) Oral daily  levothyroxine 75 MICROGram(s) Oral daily  magnesium sulfate  IVPB 1 Gram(s) IV Intermittent once  metoprolol succinate ER 50 milliGRAM(s) Oral daily  pantoprazole    Tablet 40 milliGRAM(s) Oral before breakfast  potassium chloride  10 mEq/100 mL IVPB 10 milliEquivalent(s) IV Intermittent every 1 hour  potassium phosphate IVPB 15 milliMole(s) IV Intermittent once  senna 2 Tablet(s) Oral at bedtime  sodium chloride 0.9%. 1000 milliLiter(s) (50 mL/Hr) IV Continuous <Continuous>    MEDICATIONS  (PRN):  acetaminophen     Tablet .. 650 milliGRAM(s) Oral every 6 hours PRN Mild Pain (1 - 3)  HYDROmorphone  Injectable 0.5 milliGRAM(s) IV Push every 4 hours PRN Severe Pain (7 - 10)  melatonin 3 milliGRAM(s) Oral at bedtime PRN Insomnia  traMADol 25 milliGRAM(s) Oral every 4 hours PRN Moderate Pain (4 - 6)      Allergies    sulfa drugs (Nausea)    Intolerances    codeine (Nausea)      REVIEW OF SYSTEMS:  CONSTITUTIONAL: No fever or chills  HEENT:  No headache, no sore throat  RESPIRATORY: No cough or shortness of breath  CARDIOVASCULAR: No chest pain or palpitations  GASTROINTESTINAL: No abd pain, nausea, vomiting, or diarrhea      Objective:  Vital Signs Last 24 Hrs  T(C): 36.7 (24 Jul 2023 04:28), Max: 36.7 (23 Jul 2023 19:00)  T(F): 98 (24 Jul 2023 04:28), Max: 98.1 (24 Jul 2023 00:45)  HR: 69 (24 Jul 2023 04:28) (61 - 69)  BP: 149/69 (24 Jul 2023 04:28) (149/69 - 166/48)  BP(mean): --  RR: 18 (24 Jul 2023 04:28) (18 - 18)  SpO2: 97% (24 Jul 2023 04:28) (95% - 97%)    Parameters below as of 23 Jul 2023 23:27  Patient On (Oxygen Delivery Method): room air        GENERAL: NAD, lying in bed   HEAD:  Normocephalic  EYES:  conjunctiva and sclera clear  ENT: Moist mucous membranes  NECK: Supple  CHEST/LUNG: Clear to auscultation bilaterally; No rales or rhonchi; no wheezing. Unlabored respirations  HEART: Regular rate and rhythm; S1S2+  ABDOMEN: Bowel sounds present; Soft, Nontender, Nondistended.   EXTREMITIES:  + distal Peripheral Pulses;  No cyanosis, or edema  NERVOUS SYSTEM:  Alert & Oriented X3;  No gross focal deficits   MSK: moves all extremities except RLE   SKIN: skin abrasion 2/2 fall right arm and RLE, dressing on     LABS:                        7.4    7.09  )-----------( 204      ( 24 Jul 2023 08:10 )             24.1     24 Jul 2023 08:10    144    |  117    |  31     ----------------------------<  82     3.6     |  25     |  1.30     Ca    6.8        24 Jul 2023 08:10  Phos  2.9       24 Jul 2023 08:10  Mg     1.7       24 Jul 2023 08:10    TPro  5.6    /  Alb  2.5    /  TBili  0.3    /  DBili  x      /  AST  16     /  ALT  17     /  AlkPhos  47     24 Jul 2023 08:10    PT/INR - ( 24 Jul 2023 08:10 )   PT: 13.2 sec;   INR: 1.13 ratio         PTT - ( 24 Jul 2023 08:10 )  PTT:29.3 sec  Urinalysis Basic - ( 24 Jul 2023 08:10 )    Color: x / Appearance: x / SG: x / pH: x  Gluc: 82 mg/dL / Ketone: x  / Bili: x / Urobili: x   Blood: x / Protein: x / Nitrite: x   Leuk Esterase: x / RBC: x / WBC x   Sq Epi: x / Non Sq Epi: x / Bacteria: x      CAPILLARY BLOOD GLUCOSE              RADIOLOGY & ADDITIONAL TESTS:    Personally reviewed.     Consultant(s) Notes Reviewed:  [x] YES  [ ] NO    Plan of care discussed with patient /family Son at bedside ; all questions answered  
SUBJECTIVE AND OBJECTIVE:  INTERVAL HPI/OVERNIGHT EVENTS:  Clinically stable   plan to d/c to  [facility]    DNR on chart:   Allergies    sulfa drugs (Nausea)    Intolerances    codeine (Nausea)  MEDICATIONS  (STANDING):  citalopram 20 milliGRAM(s) Oral daily  levothyroxine 75 MICROGram(s) Oral daily  lidocaine   4% Patch 1 Patch Transdermal daily  metoprolol succinate ER 50 milliGRAM(s) Oral daily  pantoprazole    Tablet 40 milliGRAM(s) Oral before breakfast  senna 2 Tablet(s) Oral at bedtime    MEDICATIONS  (PRN):  acetaminophen     Tablet .. 650 milliGRAM(s) Oral every 6 hours PRN Mild Pain (1 - 3)  melatonin 3 milliGRAM(s) Oral at bedtime PRN Insomnia  oxyCODONE    IR 5 milliGRAM(s) Oral every 4 hours PRN Severe Pain (7 - 10)  oxyCODONE    IR 2.5 milliGRAM(s) Oral every 4 hours PRN Moderate Pain (4 - 6)      ITEMS UNCHECKED ARE NOT PRESENT    PRESENT SYMPTOMS: [ ]Unable to obtain due to poor mentation   Source if other than patient:  [ ]Family   [ ]Team     Pain:  [ x]yes [ ]no  QOL impact -   Location -                    Aggravating factors -  Quality -  Radiation -  Timing-  Severity (0-10 scale):  Minimal acceptable level (0-10 scale):     Dyspnea:                           [ ]Mild [ ]Moderate [ ]Severe  Anxiety:                             [ ]Mild [ ]Moderate [ ]Severe  Fatigue:                             [ ]Mild [ ]Moderate [ ]Severe  Nausea:                             [ ]Mild [ ]Moderate [ ]Severe  Loss of appetite:              [ ]Mild [ ]Moderate [ ]Severe  Constipation:                    [ ]Mild [ ]Moderate [ ]Severe    CPOT:    https://www.[facility].org/getattachment/[email or web address]/Critical-Care-Pain-Observation-Tool-(CPOT)    PAIN AD Score:	  http://geriatrictoolkit.[place].[facility]/cog/painad.pdf (Ctrl + left click to view)    Other Symptoms:  [ x]All other review of systems negative     Palliative Performance Status Version 2:         %      http://[facility].org/files/news/palliative_performance_scale_ppsv2.pdf  PHYSICAL EXAM:  Vital Signs Last 24 Hrs  T(C): 37 (25 Jul 2023 10:51), Max: 37 (25 Jul 2023 04:55)  T(F): 98.6 (25 Jul 2023 10:51), Max: 98.6 (25 Jul 2023 04:55)  HR: 64 (25 Jul 2023 10:51) (62 - 69)  BP: 127/38 (25 Jul 2023 10:51) (118/54 - 151/43)  BP(mean): --  RR: 18 (25 Jul 2023 10:51) (18 - 18)  SpO2: 93% (25 Jul 2023 10:51) (88% - 93%)    Parameters below as of 25 Jul 2023 10:51  Patient On (Oxygen Delivery Method): room air     I&O's Summary    24 Jul 2023 07:01  -  25 Jul 2023 07:00  --------------------------------------------------------  IN: 250 mL / OUT: 350 mL / NET: -100 mL    25 Jul 2023 07:01  -  25 Jul 2023 13:46  --------------------------------------------------------  IN: 560 mL / OUT: 100 mL / NET: 460 mL        LABS:                        7.4    7.09  )-----------( 204      ( 24 Jul 2023 08:10 )             24.1   07-24    144  |  117<H>  |  31<H>  ----------------------------<  82  3.6   |  25  |  1.30    Ca    6.8<L>      24 Jul 2023 08:10  Phos  2.9     07-24  Mg     1.7     07-24    TPro  5.6<L>  /  Alb  2.5<L>  /  TBili  0.3  /  DBili  x   /  AST  16  /  ALT  17  /  AlkPhos  47  07-24  PT/INR - ( 24 Jul 2023 08:10 )   PT: 13.2 sec;   INR: 1.13 ratio         PTT - ( 24 Jul 2023 08:10 )  PTT:29.3 sec    Urinalysis Basic - ( 24 Jul 2023 08:10 )    Color: x / Appearance: x / SG: x / pH: x  Gluc: 82 mg/dL / Ketone: x  / Bili: x / Urobili: x   Blood: x / Protein: x / Nitrite: x   Leuk Esterase: x / RBC: x / WBC x   Sq Epi: x / Non Sq Epi: x / Bacteria: x      RADIOLOGY & ADDITIONAL STUDIES: reviewed    Protein Calorie Malnutrition Present: [ ]mild [ ]moderate [ ]severe [ ]underweight [ ]morbid obesity  https://www.andeal.org/vault/5230/web/files/ONC/Table_Clinical%20Characteristics%20to%20Document%20Malnutrition-White%20JV%20et%20al%202012.pdf      Weight (kg): 43.1 (07-23-23 @ 17:50)    [ ]PPSV2 < or = 30%  [ ]significant weight loss [ ]poor nutritional intake [ ]anasarca    [ ]Artificial Nutrition    REFERRALS:   [ ]Chaplaincy  [ ]Hospice  [ ]Child Life  [ ]Social Work  [ ]Case management [ ]Holistic Therapy     Goals of Care Document:    
Patient is a 89y old  Female who presents with a chief complaint of hip fracture (25 Jul 2023 13:46)      Subjective:  INTERVAL HPI/OVERNIGHT EVENTS: Patient seen and examined at bedside.  Patient c/o R HIP Pain     MEDICATIONS  (STANDING):  citalopram 20 milliGRAM(s) Oral daily  levothyroxine 75 MICROGram(s) Oral daily  lidocaine   4% Patch 1 Patch Transdermal daily  metoprolol succinate ER 50 milliGRAM(s) Oral daily  pantoprazole    Tablet 40 milliGRAM(s) Oral before breakfast  potassium chloride    Tablet ER 40 milliEquivalent(s) Oral once  senna 2 Tablet(s) Oral at bedtime    MEDICATIONS  (PRN):  acetaminophen     Tablet .. 650 milliGRAM(s) Oral every 6 hours PRN Mild Pain (1 - 3)  melatonin 3 milliGRAM(s) Oral at bedtime PRN Insomnia  oxyCODONE    IR 5 milliGRAM(s) Oral every 4 hours PRN Severe Pain (7 - 10)  oxyCODONE    IR 2.5 milliGRAM(s) Oral every 4 hours PRN Moderate Pain (4 - 6)      Allergies    sulfa drugs (Nausea)    Intolerances    codeine (Nausea)      REVIEW OF SYSTEMS:  CONSTITUTIONAL: No fever or chills  HEENT:  No headache, no sore throat  RESPIRATORY: No cough or shortness of breath  CARDIOVASCULAR: No chest pain or palpitations  GASTROINTESTINAL: No abd pain, nausea, vomiting, or diarrhea      Objective:  Vital Signs Last 24 Hrs  T(C): 37 (25 Jul 2023 10:51), Max: 37 (25 Jul 2023 04:55)  T(F): 98.6 (25 Jul 2023 10:51), Max: 98.6 (25 Jul 2023 04:55)  HR: 64 (25 Jul 2023 10:51) (62 - 69)  BP: 127/38 (25 Jul 2023 10:51) (118/54 - 151/43)  BP(mean): --  RR: 18 (25 Jul 2023 10:51) (18 - 18)  SpO2: 93% (25 Jul 2023 10:51) (88% - 93%)    Parameters below as of 25 Jul 2023 10:51  Patient On (Oxygen Delivery Method): room air        GENERAL: NAD, siting in bed   HEAD:  Normocephalic  EYES:  conjunctiva and sclera clear  ENT: Moist mucous membranes  NECK: Supple  CHEST/LUNG: Clear to auscultation bilaterally; No rales or rhonchi; no wheezing. Unlabored respirations  HEART: Regular rate and rhythm; S1S2+  ABDOMEN: Bowel sounds present; Soft, Nontender, Nondistended.   EXTREMITIES:  + distal Peripheral Pulses;  right LE slightly externally rotated   NERVOUS SYSTEM:  Alert & Oriented X3;  No gross focal deficits   MSK: moves all extremities except RLE   SKIN: No rashes     LABS:      Ca    6.8        24 Jul 2023 08:10      PT/INR - ( 24 Jul 2023 08:10 )   PT: 13.2 sec;   INR: 1.13 ratio         PTT - ( 24 Jul 2023 08:10 )  PTT:29.3 sec  Urinalysis Basic - ( 24 Jul 2023 08:10 )    Color: x / Appearance: x / SG: x / pH: x  Gluc: 82 mg/dL / Ketone: x  / Bili: x / Urobili: x   Blood: x / Protein: x / Nitrite: x   Leuk Esterase: x / RBC: x / WBC x   Sq Epi: x / Non Sq Epi: x / Bacteria: x      CAPILLARY BLOOD GLUCOSE    RADIOLOGY & ADDITIONAL TESTS:    Personally reviewed.     Consultant(s) Notes Reviewed:  [x] YES  [ ] NO    Plan of care discussed with patient ; all questions answered

## 2023-07-26 NOTE — PROGRESS NOTE ADULT - ASSESSMENT
89 y female with PMHx of multiple falls, HFpEF (EF 60%), hypertension, hypothyroidism, depression, anxiety presented to the ED for right hip pain s/p mechanical fall.     - patient made comfort measures only  - will remain available for any issue and concerns  - will sign off  - Thank you for this consult!    KARINA Ordonez  Nurse Practitioner - Cardiology   call TEAMS  
89 y female with PMHx of multiple falls, HFpEF (EF 60%), hypertension, hypothyroidism, depression, anxiety presented to the ED for right hip pain s/p mechanical fall.     Preop, HFpEF, HTN  - s/p mechanical fall, sustained Right IT fracture, ortho following, was planned for IMN now deferred as patient and family now in favor of hospice and non operative management of hip fracture      - EKG unchanged NSR, no acute ischemia noted  - trop neg  - no anginal complaints     - Chest X-ray negative for acute process  - TTE (5/2022) normal LVID, LVSF, LVEF 60% Calcified trileaflet aortic valve with moderate aortic stenosis, moderate AI. Mild mitral stenosis with mild to moderate mitral regurgitation  - Patient euvolemic on examination with no overt signs of heart failure or cardiac ischemia.     - BP and HR well controlled  - Continue home Amlodipine and Toprol XL    - Monitor and replete Lytes. Keep K > 4 and Mg > 2  - Will continue to follow.    DIMITRI OrdonezPRINCE  Nurse Practitioner - Cardiology   call TEAMS    
Clinically stable   plan to d/c to  [facility]  oxycodone prn  
89y female with PMHx of multiple falls, HFpEF (EF 60%), hypertension, hypothyroidism, depression, anxiety admitted with nondisplaced right intertrochanteric fracture s/p mechanical fall. Planning for OR for right hip IMN.        anemia: acute on chronic : will proceed with blood transfusion.     hypomagnesemia : Mag supplement   hypophosphotemia: K -phos supplement   hypokalemia: K supplement , keep K>4
89y female with PMHx of multiple falls, HFpEF (EF 60%), hypertension, hypothyroidism, depression, anxiety admitted with nondisplaced right intertrochanteric fracture s/p mechanical fall. Planning for OR for right hip IMN.

## 2023-07-26 NOTE — DISCHARGE NOTE NURSING/CASE MANAGEMENT/SOCIAL WORK - PATIENT PORTAL LINK FT
You can access the FollowMyHealth Patient Portal offered by [facility] by registering at the following website: [email or web address]. By joining [employer]’s FollowMyHealth portal, you will also be able to view your health information using other applications (apps) compatible with our system.

## 2023-08-10 ENCOUNTER — APPOINTMENT (OUTPATIENT)
Dept: GERIATRICS | Facility: CLINIC | Age: 88
End: 2023-08-10

## 2023-10-04 NOTE — DIETITIAN INITIAL EVALUATION ADULT - PERTINENT LABORATORY DATA
The patient is Watcher - Medium risk of patient condition declining or worsening    Shift Goals  Clinical Goals: stable neuro exam, q2 neuro checks, maintain SBP within parmamters  Patient Goals: tin  Family Goals: tin    Progress made toward(s) clinical / shift goals:    Problem: Hemodynamics  Goal: Patient's hemodynamics, fluid balance and neurologic status will be stable or improve  Outcome: Progressing     Problem: Fall Risk  Goal: Patient will remain free from falls  Outcome: Progressing     Problem: Pain - Standard  Goal: Alleviation of pain or a reduction in pain to the patient’s comfort goal  Outcome: Progressing     Problem: Seizure Precautions  Goal: Implementation of seizure precautions  Outcome: Progressing     Problem: Safety - Medical Restraint  Goal: Remains free of injury from restraints (Restraint for Interference with Medical Device)  Outcome: Progressing       Patient is not progressing towards the following goals:       07-24    144  |  117<H>  |  31<H>  ----------------------------<  82  3.6   |  25  |  1.30    Ca    6.8<L>      24 Jul 2023 08:10  Phos  2.9     07-24  Mg     1.7     07-24    TPro  5.6<L>  /  Alb  2.5<L>  /  TBili  0.3  /  DBili  x   /  AST  16  /  ALT  17  /  AlkPhos  47  07-24

## 2024-01-24 NOTE — PHYSICAL THERAPY INITIAL EVALUATION ADULT - GAIT TRAINING, PT EVAL
Done  
Patient advised  
Patient is requesting a referral to GI for a colonoscopy please  
STG (3-5 sessions) Assess ambulation.

## 2024-05-21 NOTE — DIETITIAN INITIAL EVALUATION ADULT - PROBLEM/PLAN-7
05- Called patient LVM-The pharmacy has requested a refill on your current medication.  If regular medications are being prescribed our Providers prefers that patients have a yearly follow up apt.     Your last apt was on 02- with AIMEE MANCINI      DISPLAY PLAN FREE TEXT

## 2024-08-02 NOTE — ED ADULT NURSE NOTE - MUSCULOSKELETAL ASSESSMENT
Detail Level: Detailed
General Sunscreen Counseling: I recommended a broad spectrum sunscreen with a SPF of 30 or higher and the importance of sun protective clothing.
- - -

## (undated) DEVICE — DRAPE C ARM UNIVERSAL

## (undated) DEVICE — SOL IRR POUR NS 0.9% 1000ML

## (undated) DEVICE — STAPLER SKIN PROXIMATE

## (undated) DEVICE — DRAPE TOWEL BLUE 17" X 24"

## (undated) DEVICE — VENODYNE/SCD SLEEVE CALF MEDIUM

## (undated) DEVICE — PLV-SCD MACHINE: Type: DURABLE MEDICAL EQUIPMENT

## (undated) DEVICE — DRILL BIT STRYKER ORTHO 4.2 X 340MM

## (undated) DEVICE — PLV-STRYKER GAMMA 3 TARGETING DEVICE: Type: DURABLE MEDICAL EQUIPMENT

## (undated) DEVICE — CLIP CLSD TB GAMMA III

## (undated) DEVICE — DRAPE 3/4 SHEET W REINFORCEMENT 56X77"

## (undated) DEVICE — WARMING BLANKET UPPER ADULT

## (undated) DEVICE — PLV-STRYKER SYSTEM 8: Type: DURABLE MEDICAL EQUIPMENT

## (undated) DEVICE — DRAPE MAYO STAND 23"

## (undated) DEVICE — PLV-CONSIGN STRYKER GAMMA OPTIONAL INST: Type: DURABLE MEDICAL EQUIPMENT

## (undated) DEVICE — GLV 8 PROTEXIS (WHITE)

## (undated) DEVICE — GLV 8.5 PROTEXIS (WHITE)

## (undated) DEVICE — SOL IRR POUR H2O 1000ML

## (undated) DEVICE — PACK HIP FRACTURE